# Patient Record
Sex: FEMALE | URBAN - METROPOLITAN AREA
[De-identification: names, ages, dates, MRNs, and addresses within clinical notes are randomized per-mention and may not be internally consistent; named-entity substitution may affect disease eponyms.]

---

## 2017-05-24 ENCOUNTER — OUTPATIENT (OUTPATIENT)
Dept: OUTPATIENT SERVICES | Facility: HOSPITAL | Age: 62
LOS: 1 days | End: 2017-05-24
Payer: MEDICAID

## 2017-05-24 DIAGNOSIS — R07.89 OTHER CHEST PAIN: ICD-10-CM

## 2017-05-24 PROCEDURE — 78452 HT MUSCLE IMAGE SPECT MULT: CPT

## 2017-05-24 PROCEDURE — A9502: CPT

## 2017-05-24 PROCEDURE — 93017 CV STRESS TEST TRACING ONLY: CPT

## 2021-07-20 ENCOUNTER — RESULT REVIEW (OUTPATIENT)
Age: 66
End: 2021-07-20

## 2021-12-24 ENCOUNTER — INPATIENT (INPATIENT)
Facility: HOSPITAL | Age: 66
LOS: 6 days | Discharge: HOME | DRG: 445 | End: 2021-12-31
Attending: STUDENT IN AN ORGANIZED HEALTH CARE EDUCATION/TRAINING PROGRAM | Admitting: HOSPITALIST
Payer: COMMERCIAL

## 2021-12-24 VITALS
HEIGHT: 62 IN | WEIGHT: 89.95 LBS | RESPIRATION RATE: 20 BRPM | DIASTOLIC BLOOD PRESSURE: 79 MMHG | HEART RATE: 87 BPM | OXYGEN SATURATION: 98 % | SYSTOLIC BLOOD PRESSURE: 149 MMHG | TEMPERATURE: 98 F

## 2021-12-24 DIAGNOSIS — K59.00 CONSTIPATION, UNSPECIFIED: ICD-10-CM

## 2021-12-24 DIAGNOSIS — K85.10 BILIARY ACUTE PANCREATITIS WITHOUT NECROSIS OR INFECTION: ICD-10-CM

## 2021-12-24 DIAGNOSIS — K80.50 CALCULUS OF BILE DUCT WITHOUT CHOLANGITIS OR CHOLECYSTITIS WITHOUT OBSTRUCTION: ICD-10-CM

## 2021-12-24 DIAGNOSIS — R73.9 HYPERGLYCEMIA, UNSPECIFIED: ICD-10-CM

## 2021-12-24 DIAGNOSIS — G40.909 EPILEPSY, UNSPECIFIED, NOT INTRACTABLE, WITHOUT STATUS EPILEPTICUS: ICD-10-CM

## 2021-12-24 DIAGNOSIS — E10.9 TYPE 1 DIABETES MELLITUS WITHOUT COMPLICATIONS: ICD-10-CM

## 2021-12-24 LAB
ALBUMIN SERPL ELPH-MCNC: 4.4 G/DL — SIGNIFICANT CHANGE UP (ref 3.3–5)
ALBUMIN SERPL ELPH-MCNC: 4.7 G/DL — SIGNIFICANT CHANGE UP (ref 3.3–5)
ALP SERPL-CCNC: 77 U/L — SIGNIFICANT CHANGE UP (ref 40–120)
ALP SERPL-CCNC: 84 U/L — SIGNIFICANT CHANGE UP (ref 40–120)
ALT FLD-CCNC: 24 U/L — SIGNIFICANT CHANGE UP (ref 10–45)
ALT FLD-CCNC: 26 U/L — SIGNIFICANT CHANGE UP (ref 10–45)
ANION GAP SERPL CALC-SCNC: 14 MMOL/L — SIGNIFICANT CHANGE UP (ref 5–17)
ANION GAP SERPL CALC-SCNC: 15 MMOL/L — SIGNIFICANT CHANGE UP (ref 5–17)
APPEARANCE UR: CLEAR — SIGNIFICANT CHANGE UP
AST SERPL-CCNC: 30 U/L — SIGNIFICANT CHANGE UP (ref 10–40)
AST SERPL-CCNC: 36 U/L — SIGNIFICANT CHANGE UP (ref 10–40)
B-OH-BUTYR SERPL-SCNC: 0.1 MMOL/L — SIGNIFICANT CHANGE UP
BASE EXCESS BLDV CALC-SCNC: 4.3 MMOL/L — HIGH (ref -2–2)
BASOPHILS # BLD AUTO: 0.05 K/UL — SIGNIFICANT CHANGE UP (ref 0–0.2)
BASOPHILS NFR BLD AUTO: 0.9 % — SIGNIFICANT CHANGE UP (ref 0–2)
BILIRUB SERPL-MCNC: 0.3 MG/DL — SIGNIFICANT CHANGE UP (ref 0.2–1.2)
BILIRUB SERPL-MCNC: 0.4 MG/DL — SIGNIFICANT CHANGE UP (ref 0.2–1.2)
BILIRUB UR-MCNC: NEGATIVE — SIGNIFICANT CHANGE UP
BUN SERPL-MCNC: 13 MG/DL — SIGNIFICANT CHANGE UP (ref 7–23)
BUN SERPL-MCNC: 8 MG/DL — SIGNIFICANT CHANGE UP (ref 7–23)
CA-I SERPL-SCNC: 1.15 MMOL/L — SIGNIFICANT CHANGE UP (ref 1.15–1.33)
CALCIUM SERPL-MCNC: 9.1 MG/DL — SIGNIFICANT CHANGE UP (ref 8.4–10.5)
CALCIUM SERPL-MCNC: 9.7 MG/DL — SIGNIFICANT CHANGE UP (ref 8.4–10.5)
CHLORIDE BLDV-SCNC: 97 MMOL/L — SIGNIFICANT CHANGE UP (ref 96–108)
CHLORIDE SERPL-SCNC: 101 MMOL/L — SIGNIFICANT CHANGE UP (ref 96–108)
CHLORIDE SERPL-SCNC: 95 MMOL/L — LOW (ref 96–108)
CO2 BLDV-SCNC: 31 MMOL/L — HIGH (ref 22–26)
CO2 SERPL-SCNC: 24 MMOL/L — SIGNIFICANT CHANGE UP (ref 22–31)
CO2 SERPL-SCNC: 24 MMOL/L — SIGNIFICANT CHANGE UP (ref 22–31)
COLOR SPEC: SIGNIFICANT CHANGE UP
CREAT SERPL-MCNC: 0.55 MG/DL — SIGNIFICANT CHANGE UP (ref 0.5–1.3)
CREAT SERPL-MCNC: 0.61 MG/DL — SIGNIFICANT CHANGE UP (ref 0.5–1.3)
DIFF PNL FLD: NEGATIVE — SIGNIFICANT CHANGE UP
EOSINOPHIL # BLD AUTO: 0.08 K/UL — SIGNIFICANT CHANGE UP (ref 0–0.5)
EOSINOPHIL NFR BLD AUTO: 1.5 % — SIGNIFICANT CHANGE UP (ref 0–6)
GAS PNL BLDV: 127 MMOL/L — LOW (ref 136–145)
GAS PNL BLDV: SIGNIFICANT CHANGE UP
GAS PNL BLDV: SIGNIFICANT CHANGE UP
GLUCOSE BLDV-MCNC: 218 MG/DL — HIGH (ref 70–99)
GLUCOSE SERPL-MCNC: 138 MG/DL — HIGH (ref 70–99)
GLUCOSE SERPL-MCNC: 210 MG/DL — HIGH (ref 70–99)
GLUCOSE UR QL: ABNORMAL
HCO3 BLDV-SCNC: 30 MMOL/L — HIGH (ref 22–29)
HCT VFR BLD CALC: 41.7 % — SIGNIFICANT CHANGE UP (ref 34.5–45)
HCT VFR BLDA CALC: 38 % — SIGNIFICANT CHANGE UP (ref 34.5–46.5)
HGB BLD CALC-MCNC: 12.5 G/DL — SIGNIFICANT CHANGE UP (ref 11.7–16.1)
HGB BLD-MCNC: 14 G/DL — SIGNIFICANT CHANGE UP (ref 11.5–15.5)
IMM GRANULOCYTES NFR BLD AUTO: 0.2 % — SIGNIFICANT CHANGE UP (ref 0–1.5)
KETONES UR-MCNC: ABNORMAL
LACTATE BLDV-MCNC: 2.7 MMOL/L — HIGH (ref 0.7–2)
LEUKOCYTE ESTERASE UR-ACNC: NEGATIVE — SIGNIFICANT CHANGE UP
LIDOCAIN IGE QN: 70 U/L — HIGH (ref 7–60)
LYMPHOCYTES # BLD AUTO: 1.06 K/UL — SIGNIFICANT CHANGE UP (ref 1–3.3)
LYMPHOCYTES # BLD AUTO: 19.2 % — SIGNIFICANT CHANGE UP (ref 13–44)
MCHC RBC-ENTMCNC: 29 PG — SIGNIFICANT CHANGE UP (ref 27–34)
MCHC RBC-ENTMCNC: 33.6 GM/DL — SIGNIFICANT CHANGE UP (ref 32–36)
MCV RBC AUTO: 86.3 FL — SIGNIFICANT CHANGE UP (ref 80–100)
MONOCYTES # BLD AUTO: 0.62 K/UL — SIGNIFICANT CHANGE UP (ref 0–0.9)
MONOCYTES NFR BLD AUTO: 11.3 % — SIGNIFICANT CHANGE UP (ref 2–14)
NEUTROPHILS # BLD AUTO: 3.69 K/UL — SIGNIFICANT CHANGE UP (ref 1.8–7.4)
NEUTROPHILS NFR BLD AUTO: 66.9 % — SIGNIFICANT CHANGE UP (ref 43–77)
NITRITE UR-MCNC: NEGATIVE — SIGNIFICANT CHANGE UP
NRBC # BLD: 0 /100 WBCS — SIGNIFICANT CHANGE UP (ref 0–0)
PCO2 BLDV: 47 MMHG — HIGH (ref 39–42)
PH BLDV: 7.41 — SIGNIFICANT CHANGE UP (ref 7.32–7.43)
PH UR: 7 — SIGNIFICANT CHANGE UP (ref 5–8)
PLATELET # BLD AUTO: 170 K/UL — SIGNIFICANT CHANGE UP (ref 150–400)
PO2 BLDV: 31 MMHG — SIGNIFICANT CHANGE UP (ref 25–45)
POTASSIUM BLDV-SCNC: 7.8 MMOL/L — CRITICAL HIGH (ref 3.5–5.1)
POTASSIUM SERPL-MCNC: 4.1 MMOL/L — SIGNIFICANT CHANGE UP (ref 3.5–5.3)
POTASSIUM SERPL-MCNC: 4.4 MMOL/L — SIGNIFICANT CHANGE UP (ref 3.5–5.3)
POTASSIUM SERPL-SCNC: 4.1 MMOL/L — SIGNIFICANT CHANGE UP (ref 3.5–5.3)
POTASSIUM SERPL-SCNC: 4.4 MMOL/L — SIGNIFICANT CHANGE UP (ref 3.5–5.3)
PROT SERPL-MCNC: 6.9 G/DL — SIGNIFICANT CHANGE UP (ref 6–8.3)
PROT SERPL-MCNC: 7.6 G/DL — SIGNIFICANT CHANGE UP (ref 6–8.3)
PROT UR-MCNC: NEGATIVE — SIGNIFICANT CHANGE UP
RBC # BLD: 4.83 M/UL — SIGNIFICANT CHANGE UP (ref 3.8–5.2)
RBC # FLD: 13 % — SIGNIFICANT CHANGE UP (ref 10.3–14.5)
SAO2 % BLDV: 54.9 % — LOW (ref 67–88)
SODIUM SERPL-SCNC: 134 MMOL/L — LOW (ref 135–145)
SODIUM SERPL-SCNC: 139 MMOL/L — SIGNIFICANT CHANGE UP (ref 135–145)
SP GR SPEC: 1.01 — SIGNIFICANT CHANGE UP (ref 1.01–1.02)
UROBILINOGEN FLD QL: NEGATIVE — SIGNIFICANT CHANGE UP
WBC # BLD: 5.51 K/UL — SIGNIFICANT CHANGE UP (ref 3.8–10.5)
WBC # FLD AUTO: 5.51 K/UL — SIGNIFICANT CHANGE UP (ref 3.8–10.5)

## 2021-12-24 PROCEDURE — 99223 1ST HOSP IP/OBS HIGH 75: CPT

## 2021-12-24 PROCEDURE — 71046 X-RAY EXAM CHEST 2 VIEWS: CPT | Mod: 26

## 2021-12-24 PROCEDURE — G1004: CPT

## 2021-12-24 PROCEDURE — 76705 ECHO EXAM OF ABDOMEN: CPT | Mod: 26,RT

## 2021-12-24 PROCEDURE — 74177 CT ABD & PELVIS W/CONTRAST: CPT | Mod: 26,MG

## 2021-12-24 PROCEDURE — 99285 EMERGENCY DEPT VISIT HI MDM: CPT

## 2021-12-24 RX ORDER — MORPHINE SULFATE 50 MG/1
2 CAPSULE, EXTENDED RELEASE ORAL EVERY 6 HOURS
Refills: 0 | Status: DISCONTINUED | OUTPATIENT
Start: 2021-12-24 | End: 2021-12-25

## 2021-12-24 RX ORDER — DEXTROSE 50 % IN WATER 50 %
15 SYRINGE (ML) INTRAVENOUS ONCE
Refills: 0 | Status: DISCONTINUED | OUTPATIENT
Start: 2021-12-24 | End: 2021-12-31

## 2021-12-24 RX ORDER — PANTOPRAZOLE SODIUM 20 MG/1
40 TABLET, DELAYED RELEASE ORAL
Refills: 0 | Status: DISCONTINUED | OUTPATIENT
Start: 2021-12-24 | End: 2021-12-31

## 2021-12-24 RX ORDER — SODIUM CHLORIDE 9 MG/ML
1000 INJECTION, SOLUTION INTRAVENOUS
Refills: 0 | Status: DISCONTINUED | OUTPATIENT
Start: 2021-12-24 | End: 2021-12-27

## 2021-12-24 RX ORDER — DEXTROSE 50 % IN WATER 50 %
25 SYRINGE (ML) INTRAVENOUS ONCE
Refills: 0 | Status: DISCONTINUED | OUTPATIENT
Start: 2021-12-24 | End: 2021-12-31

## 2021-12-24 RX ORDER — LIDOCAINE 4 G/100G
10 CREAM TOPICAL ONCE
Refills: 0 | Status: COMPLETED | OUTPATIENT
Start: 2021-12-24 | End: 2021-12-24

## 2021-12-24 RX ORDER — KETOROLAC TROMETHAMINE 30 MG/ML
15 SYRINGE (ML) INJECTION ONCE
Refills: 0 | Status: DISCONTINUED | OUTPATIENT
Start: 2021-12-24 | End: 2021-12-24

## 2021-12-24 RX ORDER — INSULIN LISPRO 100/ML
5 VIAL (ML) SUBCUTANEOUS
Refills: 0 | Status: DISCONTINUED | OUTPATIENT
Start: 2021-12-24 | End: 2021-12-29

## 2021-12-24 RX ORDER — INSULIN LISPRO 100/ML
VIAL (ML) SUBCUTANEOUS
Refills: 0 | Status: DISCONTINUED | OUTPATIENT
Start: 2021-12-24 | End: 2021-12-26

## 2021-12-24 RX ORDER — DEXTROSE 50 % IN WATER 50 %
12.5 SYRINGE (ML) INTRAVENOUS ONCE
Refills: 0 | Status: DISCONTINUED | OUTPATIENT
Start: 2021-12-24 | End: 2021-12-31

## 2021-12-24 RX ORDER — POLYETHYLENE GLYCOL 3350 17 G/17G
17 POWDER, FOR SOLUTION ORAL
Refills: 0 | Status: DISCONTINUED | OUTPATIENT
Start: 2021-12-24 | End: 2021-12-31

## 2021-12-24 RX ORDER — MULTIVIT WITH MIN/MFOLATE/K2 340-15/3 G
1 POWDER (GRAM) ORAL ONCE
Refills: 0 | Status: COMPLETED | OUTPATIENT
Start: 2021-12-24 | End: 2021-12-24

## 2021-12-24 RX ORDER — INSULIN LISPRO 100/ML
VIAL (ML) SUBCUTANEOUS AT BEDTIME
Refills: 0 | Status: DISCONTINUED | OUTPATIENT
Start: 2021-12-24 | End: 2021-12-26

## 2021-12-24 RX ORDER — SODIUM CHLORIDE 9 MG/ML
1000 INJECTION INTRAMUSCULAR; INTRAVENOUS; SUBCUTANEOUS ONCE
Refills: 0 | Status: COMPLETED | OUTPATIENT
Start: 2021-12-24 | End: 2021-12-24

## 2021-12-24 RX ORDER — ATORVASTATIN CALCIUM 80 MG/1
40 TABLET, FILM COATED ORAL AT BEDTIME
Refills: 0 | Status: DISCONTINUED | OUTPATIENT
Start: 2021-12-24 | End: 2021-12-31

## 2021-12-24 RX ORDER — FAMOTIDINE 10 MG/ML
20 INJECTION INTRAVENOUS ONCE
Refills: 0 | Status: COMPLETED | OUTPATIENT
Start: 2021-12-24 | End: 2021-12-24

## 2021-12-24 RX ORDER — GLUCAGON INJECTION, SOLUTION 0.5 MG/.1ML
1 INJECTION, SOLUTION SUBCUTANEOUS ONCE
Refills: 0 | Status: DISCONTINUED | OUTPATIENT
Start: 2021-12-24 | End: 2021-12-27

## 2021-12-24 RX ORDER — LEVETIRACETAM 250 MG/1
500 TABLET, FILM COATED ORAL
Refills: 0 | Status: DISCONTINUED | OUTPATIENT
Start: 2021-12-24 | End: 2021-12-31

## 2021-12-24 RX ORDER — SENNA PLUS 8.6 MG/1
2 TABLET ORAL AT BEDTIME
Refills: 0 | Status: DISCONTINUED | OUTPATIENT
Start: 2021-12-24 | End: 2021-12-30

## 2021-12-24 RX ORDER — INSULIN GLARGINE 100 [IU]/ML
2 INJECTION, SOLUTION SUBCUTANEOUS AT BEDTIME
Refills: 0 | Status: DISCONTINUED | OUTPATIENT
Start: 2021-12-24 | End: 2021-12-25

## 2021-12-24 RX ORDER — LACOSAMIDE 50 MG/1
50 TABLET ORAL
Refills: 0 | Status: DISCONTINUED | OUTPATIENT
Start: 2021-12-24 | End: 2021-12-31

## 2021-12-24 RX ADMIN — POLYETHYLENE GLYCOL 3350 17 GRAM(S): 17 POWDER, FOR SOLUTION ORAL at 23:56

## 2021-12-24 RX ADMIN — LEVETIRACETAM 500 MILLIGRAM(S): 250 TABLET, FILM COATED ORAL at 23:56

## 2021-12-24 RX ADMIN — ATORVASTATIN CALCIUM 40 MILLIGRAM(S): 80 TABLET, FILM COATED ORAL at 23:55

## 2021-12-24 RX ADMIN — SODIUM CHLORIDE 1000 MILLILITER(S): 9 INJECTION INTRAMUSCULAR; INTRAVENOUS; SUBCUTANEOUS at 17:15

## 2021-12-24 RX ADMIN — PANTOPRAZOLE SODIUM 40 MILLIGRAM(S): 20 TABLET, DELAYED RELEASE ORAL at 23:55

## 2021-12-24 RX ADMIN — FAMOTIDINE 20 MILLIGRAM(S): 10 INJECTION INTRAVENOUS at 16:10

## 2021-12-24 RX ADMIN — Medication 15 MILLIGRAM(S): at 16:04

## 2021-12-24 RX ADMIN — SENNA PLUS 2 TABLET(S): 8.6 TABLET ORAL at 23:56

## 2021-12-24 RX ADMIN — SODIUM CHLORIDE 100 MILLILITER(S): 9 INJECTION, SOLUTION INTRAVENOUS at 22:56

## 2021-12-24 RX ADMIN — SODIUM CHLORIDE 1000 MILLILITER(S): 9 INJECTION INTRAMUSCULAR; INTRAVENOUS; SUBCUTANEOUS at 16:05

## 2021-12-24 RX ADMIN — LIDOCAINE 10 MILLILITER(S): 4 CREAM TOPICAL at 16:10

## 2021-12-24 RX ADMIN — Medication 1 BOTTLE: at 23:56

## 2021-12-24 RX ADMIN — LACOSAMIDE 50 MILLIGRAM(S): 50 TABLET ORAL at 23:55

## 2021-12-24 RX ADMIN — Medication 30 MILLILITER(S): at 23:56

## 2021-12-24 NOTE — ED PROVIDER NOTE - OBJECTIVE STATEMENT
65yo woman with PMH HLD, DM1, seizure hx on keppra, presenting with 3 weeks of lightheadedness and 6 days of burning abdominal pain that radiates to the chest and no BM. Patient states that her glucose has been >400 over the last week. Has been eating and drinking normally, sometimes feels that she wants to eat more but can't. Has had urinary frequency. Denies nausea, vomiting, back pain.

## 2021-12-24 NOTE — H&P ADULT - ASSESSMENT
66F c hx HTN, DM1, epilepsy on keppra/vimpat, vitilogo, chronic palpitations, pw constipation, poss gallstone pancreatitis, and hyperglycemia

## 2021-12-24 NOTE — H&P ADULT - PROBLEM SELECTOR PLAN 2
- mild lipase elevated  - morphine pain control  - diet as tolerated  - GI reportedly consulted by ED  - LR

## 2021-12-24 NOTE — ED PROVIDER NOTE - PROGRESS NOTE DETAILS
Sang, PGY-2  Radiology called with concern for choledocholithiasis. GI consulted who advised to obtain bcx, repeat CBC and CMP for signs of leukocytosis or transaminitis, coags, and can hold and in setting of no fever and no leukocytosis Anuj, PGY1 - Hospitalist consulted for admission. Will admit under Dr. Wright.

## 2021-12-24 NOTE — H&P ADULT - NSICDXFAMILYHX_GEN_ALL_CORE_FT
FAMILY HISTORY:  Grandparent  Still living? No  FH: type 1 diabetes, Age at diagnosis: Age Unknown

## 2021-12-24 NOTE — H&P ADULT - NSHPREVIEWOFSYSTEMS_GEN_ALL_CORE
REVIEW OF SYSTEMS:  CONSTITUTIONAL: No weakness. No fevers. No chills. No rigors. No weight loss. No night sweats. No poor appetite.  EYES: No blurry or double vision. No eye pain.  ENT: No hearing difficulty. No vertigo. No dysphagia. No sore throat. No Sinusitis/rhinorrhea.   NECK: No pain. No stiffness/rigidity.  CARDIAC: +chronic chest pain. +chronic palpitations. +lightheadedness. No syncope.  RESPIRATORY: No cough. No SOB. No hemoptysis.  GASTROINTESTINAL: +abdominal pain. No nausea. No vomiting. No hematemesis. No diarrhea. +constipation.   GENITOURINARY: No dysuria. No frequency. No hesitancy. No hematuria. No oliguria.  NEUROLOGICAL: No numbness/tingling. No focal weakness. No urinary or fecal incontinence. No headache. No unsteady gait.  BACK: No back pain. No flank pain.  EXTREMITIES: No lower extremity edema. Full ROM. No joint pain.  SKIN: No rashes. No itching. No other lesions.  PSYCHIATRIC: No depression. No anxiety. No SI/HI.  ALLERGIC: No lip swelling. No hives.  All other review of systems is negative unless indicated above.  Unless indicated above, unable to assess ROS 2/2

## 2021-12-24 NOTE — ED ADULT NURSE NOTE - NSIMPLEMENTINTERV_GEN_ALL_ED
Implemented All Universal Safety Interventions:  [place] to call system. Call bell, personal items and telephone within reach. Instruct patient to call for assistance. Room bathroom lighting operational. Non-slip footwear when patient is off stretcher. Physically safe environment: no spills, clutter or unnecessary equipment. Stretcher in lowest position, wheels locked, appropriate side rails in place.

## 2021-12-24 NOTE — H&P ADULT - NSHPPHYSICALEXAM_GEN_ALL_CORE
PHYSICAL EXAM:   GENERAL: Alert. Not confused. No acute distress. Not thin. +cachectic. Not obese.  HEAD:  Atraumatic. Normocephalic.  EYES: EOMI. PERRLA. Normal conjunctiva/sclera.  ENT: Neck supple. No JVD. Moist oral mucosa. Not edentulous. No thrush.  LYMPH: Normal supraclavicular/cervical lymph nodes.   CARDIAC: Not tachy, Not santiago. Regular rhythm. Not irregularly irregular. S1. S2.   LUNG/CHEST: CTAB. BS equal bilaterally. No wheezes. No rales. No rhonchi.  ABDOMEN: Soft. +mild epigastric tenderness. No distension. No fluid wave. Normal bowel sounds.  BACK: No midline/vertebral tenderness. No flank tenderness.  VASCULAR: +2 b/l radial or ulnar pulses. Palpable DP pulses.  EXTREMITIES:  No clubbing. No cyanosis. No edema. Moving all 4.  NEUROLOGY: A&Ox3. Non-focal exam. Cranial nerves intact. Normal speech. Sensation intact.  PSYCH: Normal behavior. Normal affect.  SKIN: No jaundice. No erythema. No rash/lesion.  Vascular Access:     ICU Vital Signs Last 24 Hrs  T(C): 36.6 (24 Dec 2021 15:45), Max: 36.6 (24 Dec 2021 12:56)  T(F): 97.8 (24 Dec 2021 15:45), Max: 97.9 (24 Dec 2021 12:56)  HR: 68 (24 Dec 2021 21:15) (68 - 90)  BP: 138/73 (24 Dec 2021 21:15) (126/80 - 149/79)  BP(mean): --  ABP: --  ABP(mean): --  RR: 16 (24 Dec 2021 21:15) (16 - 20)  SpO2: 99% (24 Dec 2021 21:15) (98% - 99%)      I&O's Summary

## 2021-12-24 NOTE — ED PROVIDER NOTE - NS ED ROS FT
GENERAL: No fever, no chills  EYES: No change in vision  HEENT: No trouble swallowing or speaking  CARDIAC: No chest pain  PULMONARY: No cough, no SOB  GI: +abdominal pain, no nausea or no vomiting, no diarrhea, +constipation  : +burning with urination  SKIN: No rashes  NEURO: No headache, no numbness  MSK: No joint pain  Otherwise as HPI or negative.

## 2021-12-24 NOTE — ED PROVIDER NOTE - ATTENDING CONTRIBUTION TO CARE
Ysabeles - 65yo F with PMH HLD, DM, seizure hx on keppra, presenting with 3 weeks of lightheadedness and 6 days of burning abdominal pain that radiates to the chest and no BM. Patient states that her glucose has been >400 over the last week. Has been eating and drinking normally, sometimes feels that she wants to eat more but can't. Has had urinary frequency. Denies nausea, vomiting, back pain, fevers.  VS wnl, well appearing, in NAD. Moist mucosae, pink conjunctivae. Neck supple, neuro grossly intact. Lungs clear, cardiac wnl, no JVD. Abdomen soft/TTP to epigastric and lower abdomen, no CVAT. No pedal edema, no calf TTP.   Poss appy vs diverticulitis vs pancreatitis vs SBO.  Will get abdom labs, hydrate, CT abdomen, reevaluate

## 2021-12-24 NOTE — ED PROVIDER NOTE - CLINICAL SUMMARY MEDICAL DECISION MAKING FREE TEXT BOX
Anuj, PGY1 - no BM in 6 days, diffuse abdominal tenderness mostly in epigastric and suprapubic, consider simple constipation, appendicitis, acid reflux, diverticulitis. No SOB, no cough, no trauma decreased suspicion for pneumonia, pneumothorax. Chest pain is from abd pain radiation, no arm pain, decreased suspicion for MI. ct a/p, cxr, labs, ua and urine culture. *The above represents an initial assessment/impression. Please refer to progress notes for potential changes in patient clinical course*

## 2021-12-24 NOTE — ED ADULT TRIAGE NOTE - CHIEF COMPLAINT QUOTE
abdominal pain since monday. also endorsing constipation since monday. states she is also having palpitations x 2 weeks with CP, has been feeling dizzy. is a diabetic and states that her sugars have been in the 400s. 282 FS in triage

## 2021-12-24 NOTE — H&P ADULT - NSHPLABSRESULTS_GEN_ALL_CORE
Personally reviewed old records.  Personally reviewed labs.  Personally reviewed imaging.                          14.0   5.51  )-----------( 170      ( 24 Dec 2021 15:50 )             41.7           139  |  101  |  8   ----------------------------<  138<H>  4.1   |  24  |  0.55    Ca    9.1      24 Dec 2021 20:23    TPro  6.9  /  Alb  4.4  /  TBili  0.4  /  DBili  x   /  AST  30  /  ALT  24  /  AlkPhos  77  12            LIVER FUNCTIONS - ( 24 Dec 2021 20:23 )  Alb: 4.4 g/dL / Pro: 6.9 g/dL / ALK PHOS: 77 U/L / ALT: 24 U/L / AST: 30 U/L / GGT: x                 Urinalysis Basic - ( 24 Dec 2021 16:23 )    Color: Light Yellow / Appearance: Clear / S.010 / pH: x  Gluc: x / Ketone: Small  / Bili: Negative / Urobili: Negative   Blood: x / Protein: Negative / Nitrite: Negative   Leuk Esterase: Negative / RBC: x / WBC x   Sq Epi: x / Non Sq Epi: x / Bacteria: x

## 2021-12-24 NOTE — ED PROVIDER NOTE - NSICDXPASTMEDICALHX_GEN_ALL_CORE_FT
PAST MEDICAL HISTORY:  DM2 (diabetes mellitus, type 2)     HLD (hyperlipidemia)      PAST MEDICAL HISTORY:  DM (diabetes mellitus) Type 1    HLD (hyperlipidemia)

## 2021-12-24 NOTE — ED PROVIDER NOTE - PHYSICAL EXAMINATION
Gen: NAD, AOx3, able to make needs known, non-toxic  Head: NCAT  HEENT: EOMI, oral mucosa moist, normal conjunctiva  Lung: CTAB, no respiratory distress, wheezes on the right, speaking in full sentences  CV: RRR, no M/R/G, pulses bilaterally   Abd: soft, diffuse tenderness but mostly on epigastric and suprapubic regions, no guarding, no CVA tenderness  MSK: no visible bony deformities  Neuro: No focal sensory or motor deficits  Skin: Warm, well perfused, no rash  Psych: normal affect

## 2021-12-24 NOTE — H&P ADULT - NSHPSOCIALHISTORY_GEN_ALL_CORE
Social History:    Marital Status: (  ) , ( x ) Single, (  ) , (  ) , (  )   # of Children: 0  Lives with: (  ) alone, (  ) children, (  ) spouse, (  ) parents, ( x ) siblings, (  ) friends, (  ) other:   Occupation:     Substance Use/Illicit Drugs: (  ) never used vs other:   Tobacco Usage: ( x ) never smoked, (  ) former smoker, (  ) current smoker and Total Pack-Years:   Last Alcohol Usage/Frequency/Amount/Withdrawal/Hx of Abuse:  none  Foreign travel:   Animal exposure:

## 2021-12-24 NOTE — H&P ADULT - PROBLEM SELECTOR PLAN 3
- pt states her insulin regimen was changed to novolog by endo 1 week ago.  - cont lantus 2U + 5 admelog qac + low ISS for now  - LR

## 2021-12-25 DIAGNOSIS — E78.5 HYPERLIPIDEMIA, UNSPECIFIED: ICD-10-CM

## 2021-12-25 DIAGNOSIS — K80.50 CALCULUS OF BILE DUCT WITHOUT CHOLANGITIS OR CHOLECYSTITIS WITHOUT OBSTRUCTION: ICD-10-CM

## 2021-12-25 DIAGNOSIS — E10.65 TYPE 1 DIABETES MELLITUS WITH HYPERGLYCEMIA: ICD-10-CM

## 2021-12-25 DIAGNOSIS — Z29.9 ENCOUNTER FOR PROPHYLACTIC MEASURES, UNSPECIFIED: ICD-10-CM

## 2021-12-25 DIAGNOSIS — I10 ESSENTIAL (PRIMARY) HYPERTENSION: ICD-10-CM

## 2021-12-25 LAB
A1C WITH ESTIMATED AVERAGE GLUCOSE RESULT: 8.5 % — HIGH (ref 4–5.6)
ALBUMIN SERPL ELPH-MCNC: 4 G/DL — SIGNIFICANT CHANGE UP (ref 3.3–5)
ALP SERPL-CCNC: 71 U/L — SIGNIFICANT CHANGE UP (ref 40–120)
ALT FLD-CCNC: 23 U/L — SIGNIFICANT CHANGE UP (ref 10–45)
ANION GAP SERPL CALC-SCNC: 12 MMOL/L — SIGNIFICANT CHANGE UP (ref 5–17)
APTT BLD: 28.2 SEC — SIGNIFICANT CHANGE UP (ref 27.5–35.5)
AST SERPL-CCNC: 32 U/L — SIGNIFICANT CHANGE UP (ref 10–40)
BASOPHILS # BLD AUTO: 0.05 K/UL — SIGNIFICANT CHANGE UP (ref 0–0.2)
BASOPHILS NFR BLD AUTO: 0.9 % — SIGNIFICANT CHANGE UP (ref 0–2)
BILIRUB SERPL-MCNC: 0.4 MG/DL — SIGNIFICANT CHANGE UP (ref 0.2–1.2)
BUN SERPL-MCNC: 7 MG/DL — SIGNIFICANT CHANGE UP (ref 7–23)
CALCIUM SERPL-MCNC: 9 MG/DL — SIGNIFICANT CHANGE UP (ref 8.4–10.5)
CHLORIDE SERPL-SCNC: 100 MMOL/L — SIGNIFICANT CHANGE UP (ref 96–108)
CHOLEST SERPL-MCNC: 146 MG/DL — SIGNIFICANT CHANGE UP
CO2 SERPL-SCNC: 20 MMOL/L — LOW (ref 22–31)
CREAT SERPL-MCNC: 0.47 MG/DL — LOW (ref 0.5–1.3)
CULTURE RESULTS: SIGNIFICANT CHANGE UP
EOSINOPHIL # BLD AUTO: 0.19 K/UL — SIGNIFICANT CHANGE UP (ref 0–0.5)
EOSINOPHIL NFR BLD AUTO: 3.5 % — SIGNIFICANT CHANGE UP (ref 0–6)
ESTIMATED AVERAGE GLUCOSE: 197 MG/DL — HIGH (ref 68–114)
GLUCOSE SERPL-MCNC: 228 MG/DL — HIGH (ref 70–99)
HCT VFR BLD CALC: 35.6 % — SIGNIFICANT CHANGE UP (ref 34.5–45)
HCV AB S/CO SERPL IA: 0.09 S/CO — SIGNIFICANT CHANGE UP (ref 0–0.99)
HCV AB SERPL-IMP: SIGNIFICANT CHANGE UP
HDLC SERPL-MCNC: 86 MG/DL — SIGNIFICANT CHANGE UP
HGB BLD-MCNC: 12.1 G/DL — SIGNIFICANT CHANGE UP (ref 11.5–15.5)
IMM GRANULOCYTES NFR BLD AUTO: 0.2 % — SIGNIFICANT CHANGE UP (ref 0–1.5)
INR BLD: 1.01 RATIO — SIGNIFICANT CHANGE UP (ref 0.88–1.16)
LIPID PNL WITH DIRECT LDL SERPL: 52 MG/DL — SIGNIFICANT CHANGE UP
LYMPHOCYTES # BLD AUTO: 0.98 K/UL — LOW (ref 1–3.3)
LYMPHOCYTES # BLD AUTO: 17.9 % — SIGNIFICANT CHANGE UP (ref 13–44)
MAGNESIUM SERPL-MCNC: 2.4 MG/DL — SIGNIFICANT CHANGE UP (ref 1.6–2.6)
MCHC RBC-ENTMCNC: 29.3 PG — SIGNIFICANT CHANGE UP (ref 27–34)
MCHC RBC-ENTMCNC: 34 GM/DL — SIGNIFICANT CHANGE UP (ref 32–36)
MCV RBC AUTO: 86.2 FL — SIGNIFICANT CHANGE UP (ref 80–100)
MONOCYTES # BLD AUTO: 0.43 K/UL — SIGNIFICANT CHANGE UP (ref 0–0.9)
MONOCYTES NFR BLD AUTO: 7.8 % — SIGNIFICANT CHANGE UP (ref 2–14)
NEUTROPHILS # BLD AUTO: 3.83 K/UL — SIGNIFICANT CHANGE UP (ref 1.8–7.4)
NEUTROPHILS NFR BLD AUTO: 69.7 % — SIGNIFICANT CHANGE UP (ref 43–77)
NON HDL CHOLESTEROL: 60 MG/DL — SIGNIFICANT CHANGE UP
NRBC # BLD: 0 /100 WBCS — SIGNIFICANT CHANGE UP (ref 0–0)
PHOSPHATE SERPL-MCNC: 2.8 MG/DL — SIGNIFICANT CHANGE UP (ref 2.5–4.5)
PLATELET # BLD AUTO: 170 K/UL — SIGNIFICANT CHANGE UP (ref 150–400)
POTASSIUM SERPL-MCNC: 4.3 MMOL/L — SIGNIFICANT CHANGE UP (ref 3.5–5.3)
POTASSIUM SERPL-SCNC: 4.3 MMOL/L — SIGNIFICANT CHANGE UP (ref 3.5–5.3)
PROT SERPL-MCNC: 6.5 G/DL — SIGNIFICANT CHANGE UP (ref 6–8.3)
PROTHROM AB SERPL-ACNC: 12.1 SEC — SIGNIFICANT CHANGE UP (ref 10.6–13.6)
RBC # BLD: 4.13 M/UL — SIGNIFICANT CHANGE UP (ref 3.8–5.2)
RBC # FLD: 12.9 % — SIGNIFICANT CHANGE UP (ref 10.3–14.5)
SARS-COV-2 RNA SPEC QL NAA+PROBE: SIGNIFICANT CHANGE UP
SODIUM SERPL-SCNC: 132 MMOL/L — LOW (ref 135–145)
SPECIMEN SOURCE: SIGNIFICANT CHANGE UP
TRIGL SERPL-MCNC: 38 MG/DL — SIGNIFICANT CHANGE UP
TROPONIN T, HIGH SENSITIVITY RESULT: 11 NG/L — SIGNIFICANT CHANGE UP (ref 0–51)
TSH SERPL-MCNC: 2.92 UIU/ML — SIGNIFICANT CHANGE UP (ref 0.27–4.2)
WBC # BLD: 5.49 K/UL — SIGNIFICANT CHANGE UP (ref 3.8–10.5)
WBC # FLD AUTO: 5.49 K/UL — SIGNIFICANT CHANGE UP (ref 3.8–10.5)

## 2021-12-25 PROCEDURE — 99222 1ST HOSP IP/OBS MODERATE 55: CPT | Mod: GC

## 2021-12-25 PROCEDURE — 99222 1ST HOSP IP/OBS MODERATE 55: CPT

## 2021-12-25 PROCEDURE — 99233 SBSQ HOSP IP/OBS HIGH 50: CPT

## 2021-12-25 RX ORDER — INFLUENZA VIRUS VACCINE 15; 15; 15; 15 UG/.5ML; UG/.5ML; UG/.5ML; UG/.5ML
0.7 SUSPENSION INTRAMUSCULAR ONCE
Refills: 0 | Status: DISCONTINUED | OUTPATIENT
Start: 2021-12-25 | End: 2021-12-31

## 2021-12-25 RX ORDER — INSULIN GLARGINE 100 [IU]/ML
8 INJECTION, SOLUTION SUBCUTANEOUS AT BEDTIME
Refills: 0 | Status: DISCONTINUED | OUTPATIENT
Start: 2021-12-25 | End: 2021-12-26

## 2021-12-25 RX ADMIN — PANTOPRAZOLE SODIUM 40 MILLIGRAM(S): 20 TABLET, DELAYED RELEASE ORAL at 18:12

## 2021-12-25 RX ADMIN — INSULIN GLARGINE 2 UNIT(S): 100 INJECTION, SOLUTION SUBCUTANEOUS at 00:11

## 2021-12-25 RX ADMIN — POLYETHYLENE GLYCOL 3350 17 GRAM(S): 17 POWDER, FOR SOLUTION ORAL at 18:11

## 2021-12-25 RX ADMIN — Medication 3: at 18:05

## 2021-12-25 RX ADMIN — LEVETIRACETAM 500 MILLIGRAM(S): 250 TABLET, FILM COATED ORAL at 18:12

## 2021-12-25 RX ADMIN — Medication 1: at 22:19

## 2021-12-25 RX ADMIN — LACOSAMIDE 50 MILLIGRAM(S): 50 TABLET ORAL at 22:23

## 2021-12-25 RX ADMIN — Medication 2: at 09:06

## 2021-12-25 RX ADMIN — INSULIN GLARGINE 8 UNIT(S): 100 INJECTION, SOLUTION SUBCUTANEOUS at 22:19

## 2021-12-25 RX ADMIN — PANTOPRAZOLE SODIUM 40 MILLIGRAM(S): 20 TABLET, DELAYED RELEASE ORAL at 05:39

## 2021-12-25 RX ADMIN — Medication 3: at 12:21

## 2021-12-25 RX ADMIN — Medication 5 UNIT(S): at 18:06

## 2021-12-25 RX ADMIN — POLYETHYLENE GLYCOL 3350 17 GRAM(S): 17 POWDER, FOR SOLUTION ORAL at 05:39

## 2021-12-25 RX ADMIN — ATORVASTATIN CALCIUM 40 MILLIGRAM(S): 80 TABLET, FILM COATED ORAL at 21:05

## 2021-12-25 RX ADMIN — Medication 5 UNIT(S): at 12:22

## 2021-12-25 RX ADMIN — LEVETIRACETAM 500 MILLIGRAM(S): 250 TABLET, FILM COATED ORAL at 05:39

## 2021-12-25 RX ADMIN — Medication 5 UNIT(S): at 09:06

## 2021-12-25 RX ADMIN — LACOSAMIDE 50 MILLIGRAM(S): 50 TABLET ORAL at 12:31

## 2021-12-25 NOTE — CONSULT NOTE ADULT - ASSESSMENT
66F c hx HTN, DM1, epilepsy on keppra/vimpat, vitilogo, chronic palpitations, pw 6 days constipation and abdominal pain with work up revealing gallstones and choledocholithiasis with 4 mm stone at the level of the pancreas    #Abd pain  #choledocholithiasis with 4 mm stone at the level of the pancreas  Most likely source of abdominal pain. normal lipase and nl finding of pancreas in CT, making pancreatitis less likely.     #Constipation    Recommendations:  -Obtain MRCP  -Advance diet as tolerated  -Start Miralax BID, given tap water enema x 2 if no BM in the next 24 hrs.      Chacorta Viramontes MD  Gastroenterology/Hepatology Fellow  Contact on-call GI fellow via answering service ([phone]) from 5pm-8am AND on weekends/holidays           66F c hx HTN, DM1, epilepsy on keppra/vimpat, vitilogo, chronic palpitations, pw 6 days constipation and abdominal pain with work up revealing gallstones and choledocholithiasis with 4 mm stone at the level of the pancreas    #Abd pain  #choledocholithiasis with 4 mm stone at the level of the pancreas  Most likely source of abdominal pain. slight elevation in lipase and nl finding of pancreas in CT, thus no concern fro pancreatitis  Reports sxs improvement.     #Constipation    Recommendations:  -Advance diet as tolerated  -Start Miralax BID, given tap water enema x 2 if no BM in the next 24 hrs.  -Will discuss with advance attending regarding timing for ERCP (inpatient vs outpatient given pain improved). NO urgency for ERCP at this time.    Chacorta Viramontes MD  Gastroenterology/Hepatology Fellow  Contact on-call GI fellow via answering service ([phone]) from 5pm-8am AND on weekends/holidays

## 2021-12-25 NOTE — CONSULT NOTE ADULT - SUBJECTIVE AND OBJECTIVE BOX
HPI:  66F c hx HTN, DM1, epilepsy on keppra/vimpat, vitilogo, chronic palpitations, pw 6 days constipation and abdominal pain for the past 2-3 weeks Regarding her pain, reports its localized in epigastric region. Pain is always there but exacerbated by food. No N or V reported. Patient has never had any previous episdoes similar to htis, denies any previous pancreatitis or gallstone issues.     Pt's last colonscopy was 11 yrs ago. Never had EGD before.    Allergies:  penicillin (Headache)        Hospital Medications:  atorvastatin 40 milliGRAM(s) Oral at bedtime  dextrose 40% Gel 15 Gram(s) Oral once  dextrose 5%. 1000 milliLiter(s) IV Continuous <Continuous>  dextrose 5%. 1000 milliLiter(s) IV Continuous <Continuous>  dextrose 50% Injectable 25 Gram(s) IV Push once  dextrose 50% Injectable 12.5 Gram(s) IV Push once  dextrose 50% Injectable 25 Gram(s) IV Push once  glucagon  Injectable 1 milliGRAM(s) IntraMuscular once  influenza  Vaccine (HIGH DOSE) 0.7 milliLiter(s) IntraMuscular once  insulin glargine Injectable (LANTUS) 2 Unit(s) SubCutaneous at bedtime  insulin lispro (ADMELOG) corrective regimen sliding scale   SubCutaneous three times a day before meals  insulin lispro (ADMELOG) corrective regimen sliding scale   SubCutaneous at bedtime  insulin lispro Injectable (ADMELOG) 5 Unit(s) SubCutaneous three times a day before meals  lacosamide 50 milliGRAM(s) Oral two times a day  lactated ringers. 1000 milliLiter(s) IV Continuous <Continuous>  levETIRAcetam 500 milliGRAM(s) Oral two times a day  pantoprazole    Tablet 40 milliGRAM(s) Oral two times a day  polyethylene glycol 3350 17 Gram(s) Oral two times a day  senna 2 Tablet(s) Oral at bedtime      PMHX/PSHX:  HLD (hyperlipidemia)    DM2 (diabetes mellitus, type 2)    DM (diabetes mellitus)    No significant past surgical history        Family history:  FH: type 1 diabetes (Grandparent)        Social History: no smoking    ROS:   all negative except as mentioned above.      PHYSICAL EXAM:   GENERAL:  NAD, Appears stated age  HEENT:  NC/AT,  conjunctivae clear and pink, sclera -anicteric  CHEST:  CTA B/L, Normal effort  HEART:  RRR S1/S2,  ABDOMEN:  Soft, mild discomfort in epigastric region  EXTREMITIES:  No cyanosis or Edema  SKIN:  Warm & Dry. No rash or erythema  NEURO:  Alert, oriented, no focal deficit    Vital Signs:  Vital Signs Last 24 Hrs  T(C): 36.6 (25 Dec 2021 04:20), Max: 36.6 (24 Dec 2021 12:56)  T(F): 97.9 (25 Dec 2021 04:20), Max: 97.9 (24 Dec 2021 12:56)  HR: 71 (25 Dec 2021 04:20) (68 - 90)  BP: 120/70 (25 Dec 2021 04:20) (120/70 - 149/79)  BP(mean): --  RR: 16 (25 Dec 2021 04:20) (16 - 20)  SpO2: 98% (25 Dec 2021 04:20) (98% - 100%)  Daily Height in cm: 157.48 (24 Dec 2021 12:56)    Daily     LABS:                        12.1   5.49  )-----------( 170      ( 25 Dec 2021 07:04 )             35.6     Mean Cell Volume: 86.2 fl (- @ 07:04)        132<L>  |  100  |  7   ----------------------------<  228<H>  4.3   |  20<L>  |  0.47<L>    Ca    9.0      25 Dec 2021 07:04  Phos  2.8       Mg     2.4         TPro  6.5  /  Alb  4.0  /  TBili  0.4  /  DBili  x   /  AST  32  /  ALT  23  /  AlkPhos  71      LIVER FUNCTIONS - ( 25 Dec 2021 07:04 )  Alb: 4.0 g/dL / Pro: 6.5 g/dL / ALK PHOS: 71 U/L / ALT: 23 U/L / AST: 32 U/L / GGT: x           PT/INR - ( 25 Dec 2021 07:08 )   PT: 12.1 sec;   INR: 1.01 ratio         PTT - ( 25 Dec 2021 07:08 )  PTT:28.2 sec  Urinalysis Basic - ( 24 Dec 2021 16:23 )    Color: Light Yellow / Appearance: Clear / S.010 / pH: x  Gluc: x / Ketone: Small  / Bili: Negative / Urobili: Negative   Blood: x / Protein: Negative / Nitrite: Negative   Leuk Esterase: Negative / RBC: x / WBC x   Sq Epi: x / Non Sq Epi: x / Bacteria: x      Amylase Serum--      Lipase serum70       Ammonia--                          12.1   5.49  )-----------( 170      ( 25 Dec 2021 07:04 )             35.6                         14.0   5.51  )-----------( 170      ( 24 Dec 2021 15:50 )             41.7     Imaging:  < from: US Abdomen Upper Quadrant Right (21 @ 19:22) >  FINDINGS:    Liver: Within normal limits.  Bile ducts: No evidence for intrahepatic biliary ductal dilatation.   Common bile duct measures 6-7 mm. Note is made of a 6 mm calculus within   the extrahepatic CBD.  Gallbladder: Multiple gallstones identified. No definite gallbladder wall   thickening identified. No pericholecystic fluid.  Pancreas: Visualized portions are within normal limits.  Right kidney: 9.9 cm. No hydronephrosis.  Ascites: None.  IVC and aorta: Visualized portions are within normal limits.    IMPRESSION:  Common bile duct measures 6-7 mm. Note is made of a 6 mm calculus within   the extrahepatic CBD. Multiple gallstones. No definite gallbladder wall   thickening.    < end of copied text >  < from: CT Abdomen and Pelvis w/ IV Cont (21 @ 17:08) >    FINDINGS:  LOWER CHEST: Tree millimeter right lower lobe subpleural nodule    LIVER: Within normal limits.  BILE DUCTS: 0.4 cm stone within the common bile duct at the level of the   pancreatic head with the duct measuring approximately 1.0 cm in diameter.  GALLBLADDER: Cholelithiasis.  SPLEEN: Within normal limits.  PANCREAS: Within normal limits.  ADRENALS: Within normal limits.  KIDNEYS/URETERS: Within normal limits.    BLADDER: Within normal limits.  REPRODUCTIVE ORGANS: Uterus and adnexa within normal limits.    BOWEL: No bowel obstruction. Appendix normal.  PERITONEUM: Small ascites in the pelvis  VESSELS: Atherosclerotic changes.  RETROPERITONEUM/LYMPH NODES: No lymphadenopathy.  ABDOMINAL WALL: Within normal limits.  BONES: Degenerative changes.    IMPRESSION:  Choledocholithiasis with 0.4 cm stone at the level of the pancreatic head   within the main bile duct with resulting mild intrahepatic biliary ductal   dilation    < end of copied text >

## 2021-12-25 NOTE — PATIENT PROFILE ADULT - FALL HARM RISK - UNIVERSAL INTERVENTIONS
Bed in lowest position, wheels locked, appropriate side rails in place/Call bell, personal items and telephone in reach/Instruct patient to call for assistance before getting out of bed or chair/Non-slip footwear when patient is out of bed/[place] to call system/Physically safe environment - no spills, clutter or unnecessary equipment/Purposeful Proactive Rounding/Room/bathroom lighting operational, light cord in reach

## 2021-12-25 NOTE — PATIENT PROFILE ADULT - NSPROPTRIGHTSUPPORTPHONE_GEN_A_NUR
07/21/21 1331   COVID Exposure Risk Screening   Have you been practicing social distancing? Yes   Have you been wearing a mask when in the community? Yes   Are the people you live with following social distancing and wearing a mask?  Yes   While you are [phone]

## 2021-12-25 NOTE — CONSULT NOTE ADULT - SUBJECTIVE AND OBJECTIVE BOX
HPI:  66F hx HTN, DM1, epilepsy on keppra/vimpat, vitiligo, chronic palpitations, pw 6 days constipation, lightheadedness, burning epigastric pain. Appears to be due to choledocholithiasis. Endocrine consulted for T1DM management.    DM diagnosis: T1DM, diagnosed  Last A1c:  Endocrinologist:  Home DM meds:  Adherence:  Microvascular complications: Renal, ophthalmologic, neuropathy  Macrovascular complications: CVA, MI, PAD  SMBG:  BS range:  Symptoms:  Diet at home:  Appetite in the hospital:  PMHx: As above. No known hx of HF, pancreatitis or UTIs.  PSHx:  FHx: No known FHx of DM or thyroid cancer.  SHx:  Insurance:  Resides in:      PAST MEDICAL & SURGICAL HISTORY:  HLD (hyperlipidemia)    DM (diabetes mellitus)  Type 1    No significant past surgical history        FAMILY HISTORY:  FH: type 1 diabetes (Grandparent)        Social History:    Outpatient Medications: Home Medications:  Basaglar KwikPen 100 units/mL subcutaneous solution: 2 unit(s) subcutaneous once a day (at bedtime) (24 Dec 2021 22:22)  Crestor 20 mg oral tablet: 1 tab(s) orally once a day (24 Dec 2021 22:22)  Keppra 500 mg oral tablet: 1 tab(s) orally 2 times a day (24 Dec 2021 22:22)  metoprolol tartrate 25 mg oral tablet: 0.5 tab(s) orally once a day (24 Dec 2021 22:22)  NovoLOG 100 units/mL injectable solution: 8 unit(s) injectable 3 times a day (24 Dec 2021 22:22)  omeprazole 20 mg oral delayed release capsule: 1 cap(s) orally once a day (24 Dec 2021 22:22)  Vimpat 50 mg oral tablet: 1 tab(s) orally 2 times a day (24 Dec 2021 22:22)  Zetia 10 mg oral tablet: 1 tab(s) orally once a day (24 Dec 2021 22:22)      MEDICATIONS  (STANDING):  atorvastatin 40 milliGRAM(s) Oral at bedtime  dextrose 40% Gel 15 Gram(s) Oral once  dextrose 5%. 1000 milliLiter(s) (50 mL/Hr) IV Continuous <Continuous>  dextrose 5%. 1000 milliLiter(s) (100 mL/Hr) IV Continuous <Continuous>  dextrose 50% Injectable 25 Gram(s) IV Push once  dextrose 50% Injectable 12.5 Gram(s) IV Push once  dextrose 50% Injectable 25 Gram(s) IV Push once  glucagon  Injectable 1 milliGRAM(s) IntraMuscular once  influenza  Vaccine (HIGH DOSE) 0.7 milliLiter(s) IntraMuscular once  insulin glargine Injectable (LANTUS) 2 Unit(s) SubCutaneous at bedtime  insulin lispro (ADMELOG) corrective regimen sliding scale   SubCutaneous three times a day before meals  insulin lispro (ADMELOG) corrective regimen sliding scale   SubCutaneous at bedtime  insulin lispro Injectable (ADMELOG) 5 Unit(s) SubCutaneous three times a day before meals  lacosamide 50 milliGRAM(s) Oral two times a day  lactated ringers. 1000 milliLiter(s) (100 mL/Hr) IV Continuous <Continuous>  levETIRAcetam 500 milliGRAM(s) Oral two times a day  pantoprazole    Tablet 40 milliGRAM(s) Oral two times a day  polyethylene glycol 3350 17 Gram(s) Oral two times a day  senna 2 Tablet(s) Oral at bedtime    MEDICATIONS  (PRN):      Allergies    penicillin (Headache)    Intolerances      Review of Systems:  Constitutional: No fever, chills   Neuro: No tremors, headache   Cardiovascular: No chest pain, palpitations  Respiratory: No SOB, no cough  GI: No nausea, vomiting, abdominal pain  : No dysuria, polyuria   Skin: no rash, ulcers   Psych: no depression, anxiety   Endocrine: no polyphagia, polydipsia     ALL OTHER SYSTEMS REVIEWED AND NEGATIVE        PHYSICAL EXAM:  VITALS: T(C): 36.7 (12-25-21 @ 11:15)  T(F): 98 (12-25-21 @ 11:15), Max: 98 (12-25-21 @ 11:15)  HR: 73 (12-25-21 @ 11:15) (68 - 90)  BP: 96/58 (12-25-21 @ 11:15) (96/58 - 138/73)  RR:  (16 - 18)  SpO2:  (98% - 100%)  Wt(kg): --  GENERAL: NAD, well-groomed, well-developed  EYES: No proptosis, anicteric  HEENT:  Atraumatic, Normocephalic, moist mucous membranes  THYROID: Normal size, no palpable nodules  RESPIRATORY: Clear to auscultation bilaterally; No rales, rhonchi, wheezing  CARDIOVASCULAR: Regular rate and rhythm; No murmurs  GI: Soft, nontender, non distended, normal bowel sounds  SKIN: Dry, intact, No rashes or lesions  NEURO: AOx3, moves all extremities spontaneously   PSYCH: Reactive affect, euthymic mood    POCT Blood Glucose.: 299 mg/dL (12-25-21 @ 12:18)  POCT Blood Glucose.: 203 mg/dL (12-25-21 @ 08:57)  POCT Blood Glucose.: 189 mg/dL (12-25-21 @ 00:07)  POCT Blood Glucose.: 169 mg/dL (12-24-21 @ 21:54)  POCT Blood Glucose.: 289 mg/dL (12-24-21 @ 12:59)                            12.1   5.49  )-----------( 170      ( 25 Dec 2021 07:04 )             35.6       12-25    132<L>  |  100  |  7   ----------------------------<  228<H>  4.3   |  20<L>  |  0.47<L>    EGFR if : 119  EGFR if non : 103    Ca    9.0      12-25  Mg     2.4     12-25  Phos  2.8     12-25    TPro  6.5  /  Alb  4.0  /  TBili  0.4  /  DBili  x   /  AST  32  /  ALT  23  /  AlkPhos  71  12-25      Thyroid Function Tests:  12-25 @ 09:24 TSH 2.92 FreeT4 -- T3 -- Anti TPO -- Anti Thyroglobulin Ab -- TSI --      12-25 Chol 146 Direct LDL -- LDL calculated 52 HDL 86 Trig 38        Radiology:                HPI:  66F hx HTN, DM1, epilepsy on keppra/vimpat, vitiligo, chronic palpitations, pw 6 days constipation, lightheadedness, burning epigastric pain. Appears to be due to choledocholithiasis. Endocrine consulted for T1DM management.    DM diagnosis: T1DM, diagnosed years ago  Last A1c: 7.7% last month  Endocrinologist: Dr. Mc in [facility] DM meds: Novolog 8 units TIDac, Basaglar 2 units qhs  Adherence: Never misses doses. Rotates injection sites and holds for 10 seconds on injection.  Microvascular complications: No renal complications, no recent dilated eye exam but no known retinopathy, no neuropathy  Macrovascular complications: No CVA, MI, PAD  SMBG: 3x/day. Last couple months goes up into the 400s in the morning and later in the day as well. No lows. No blurred vision but having polydipsia and polyuria.  Diet at home: Eats a lot of rice. Tries to control portions. No soda or juice.  Appetite in the hospital: Finishing meals in hospital.    On additional ROS, reports occasional SOB. Gets exertional CP and palpitations.    PAST MEDICAL & SURGICAL HISTORY:  HLD (hyperlipidemia)    DM (diabetes mellitus)  Type 1        No significant past surgical history        FAMILY HISTORY:  FH: type 1 diabetes (Grandparent)  No known FHx of thyroid cancer.  Grandma with DM.    Social History: No tobacco or EtOH use.    Outpatient Medications: Home Medications:  Basaglar KwikPen 100 units/mL subcutaneous solution: 2 unit(s) subcutaneous once a day (at bedtime) (24 Dec 2021 22:22)  Crestor 20 mg oral tablet: 1 tab(s) orally once a day (24 Dec 2021 22:22)  Keppra 500 mg oral tablet: 1 tab(s) orally 2 times a day (24 Dec 2021 22:22)  metoprolol tartrate 25 mg oral tablet: 0.5 tab(s) orally once a day (24 Dec 2021 22:22)  NovoLOG 100 units/mL injectable solution: 8 unit(s) injectable 3 times a day (24 Dec 2021 22:22)  omeprazole 20 mg oral delayed release capsule: 1 cap(s) orally once a day (24 Dec 2021 22:22)  Vimpat 50 mg oral tablet: 1 tab(s) orally 2 times a day (24 Dec 2021 22:22)  Zetia 10 mg oral tablet: 1 tab(s) orally once a day (24 Dec 2021 22:22)      MEDICATIONS  (STANDING):  atorvastatin 40 milliGRAM(s) Oral at bedtime  dextrose 40% Gel 15 Gram(s) Oral once  dextrose 5%. 1000 milliLiter(s) (50 mL/Hr) IV Continuous <Continuous>  dextrose 5%. 1000 milliLiter(s) (100 mL/Hr) IV Continuous <Continuous>  dextrose 50% Injectable 25 Gram(s) IV Push once  dextrose 50% Injectable 12.5 Gram(s) IV Push once  dextrose 50% Injectable 25 Gram(s) IV Push once  glucagon  Injectable 1 milliGRAM(s) IntraMuscular once  influenza  Vaccine (HIGH DOSE) 0.7 milliLiter(s) IntraMuscular once  insulin glargine Injectable (LANTUS) 2 Unit(s) SubCutaneous at bedtime  insulin lispro (ADMELOG) corrective regimen sliding scale   SubCutaneous three times a day before meals  insulin lispro (ADMELOG) corrective regimen sliding scale   SubCutaneous at bedtime  insulin lispro Injectable (ADMELOG) 5 Unit(s) SubCutaneous three times a day before meals  lacosamide 50 milliGRAM(s) Oral two times a day  lactated ringers. 1000 milliLiter(s) (100 mL/Hr) IV Continuous <Continuous>  levETIRAcetam 500 milliGRAM(s) Oral two times a day  pantoprazole    Tablet 40 milliGRAM(s) Oral two times a day  polyethylene glycol 3350 17 Gram(s) Oral two times a day  senna 2 Tablet(s) Oral at bedtime    MEDICATIONS  (PRN):      Allergies    penicillin (Headache)    Intolerances      Review of Systems:  Constitutional:  No fever, No chills.  Eye:  No eye pain, No blurring.   Ear/Nose/Mouth/Throat:  No nasal congestion, No sore throat.   Respiratory:  + shortness of breath, No cough, No sputum production, No hemoptysis.   Cardiovascular:  + chest pain, + palpitations.   Gastrointestinal:  + abd pain but no nausea or vomiting  Genitourinary:  No dysuria, No hematuria.   Endocrine:  + excessive thirst, + polyuria.   Musculoskeletal:  No back pain, No decreased range of motion.   Integumentary:  No rash, + vitiligo.  Neurologic:  Alert and oriented X4, No confusion currently, No numbness, No tingling.   Additional ROS reviewed and negative except as indicated in HPI.        PHYSICAL EXAM:  VITALS: T(C): 36.7 (12-25-21 @ 11:15)  T(F): 98 (12-25-21 @ 11:15), Max: 98 (12-25-21 @ 11:15)  HR: 73 (12-25-21 @ 11:15) (68 - 90)  BP: 96/58 (12-25-21 @ 11:15) (96/58 - 138/73)  RR:  (16 - 18)  SpO2:  (98% - 100%)  Wt(kg): --  General: Well-developed female, No acute distress, Speaking full sentences.   Eye:  Extraocular movements are intact, No proptosis or lid lag.   HENT:  Normocephalic.   Neck:  Supple, Non-tender.   Respiratory:  Respirations are non-labored, Symmetric chest wall expansion, Breath sounds are equal.   Cardiovascular:  Normal rate, Regular rhythm, No edema.  Gastrointestinal:  Soft, minimal epigastric tenderness to palpation, Non-distended.   Musculoskeletal:  Normal range of motion, No gross joint swelling.   Feet:  Normal by visual exam, Normal pulses, No ulcers.   Integumentary:  Warm, dry. + vitiligo.  Mental Status Exam:  Orientedx4, Speech clear and coherent.   Neurologic:  Alert, Orientedx4, Normal motor function, No focal deficits, Cranial Nerves II-XII are grossly intact bilaterally.   Psychiatric:  Cooperative, Appropriate mood & affect.    POCT Blood Glucose.: 299 mg/dL (12-25-21 @ 12:18)  POCT Blood Glucose.: 203 mg/dL (12-25-21 @ 08:57)  POCT Blood Glucose.: 189 mg/dL (12-25-21 @ 00:07)  POCT Blood Glucose.: 169 mg/dL (12-24-21 @ 21:54)  POCT Blood Glucose.: 289 mg/dL (12-24-21 @ 12:59)                            12.1   5.49  )-----------( 170      ( 25 Dec 2021 07:04 )             35.6       12-25    132<L>  |  100  |  7   ----------------------------<  228<H>  4.3   |  20<L>  |  0.47<L>    EGFR if : 119  EGFR if non : 103    Ca    9.0      12-25  Mg     2.4     12-25  Phos  2.8     12-25    TPro  6.5  /  Alb  4.0  /  TBili  0.4  /  DBili  x   /  AST  32  /  ALT  23  /  AlkPhos  71  12-25      Thyroid Function Tests:  12-25 @ 09:24 TSH 2.92 FreeT4 -- T3 -- Anti TPO -- Anti Thyroglobulin Ab -- TSI --      12-25 Chol 146 Direct LDL -- LDL calculated 52 HDL 86 Trig 38        Radiology:

## 2021-12-26 PROCEDURE — 99232 SBSQ HOSP IP/OBS MODERATE 35: CPT

## 2021-12-26 RX ORDER — INSULIN GLARGINE 100 [IU]/ML
3 INJECTION, SOLUTION SUBCUTANEOUS AT BEDTIME
Refills: 0 | Status: DISCONTINUED | OUTPATIENT
Start: 2021-12-26 | End: 2021-12-27

## 2021-12-26 RX ORDER — INSULIN LISPRO 100/ML
VIAL (ML) SUBCUTANEOUS EVERY 6 HOURS
Refills: 0 | Status: DISCONTINUED | OUTPATIENT
Start: 2021-12-26 | End: 2021-12-28

## 2021-12-26 RX ADMIN — PANTOPRAZOLE SODIUM 40 MILLIGRAM(S): 20 TABLET, DELAYED RELEASE ORAL at 17:11

## 2021-12-26 RX ADMIN — POLYETHYLENE GLYCOL 3350 17 GRAM(S): 17 POWDER, FOR SOLUTION ORAL at 17:12

## 2021-12-26 RX ADMIN — Medication 3: at 12:37

## 2021-12-26 RX ADMIN — PANTOPRAZOLE SODIUM 40 MILLIGRAM(S): 20 TABLET, DELAYED RELEASE ORAL at 05:09

## 2021-12-26 RX ADMIN — Medication 5 UNIT(S): at 12:37

## 2021-12-26 RX ADMIN — LEVETIRACETAM 500 MILLIGRAM(S): 250 TABLET, FILM COATED ORAL at 05:08

## 2021-12-26 RX ADMIN — INSULIN GLARGINE 3 UNIT(S): 100 INJECTION, SOLUTION SUBCUTANEOUS at 22:55

## 2021-12-26 RX ADMIN — LACOSAMIDE 50 MILLIGRAM(S): 50 TABLET ORAL at 21:11

## 2021-12-26 RX ADMIN — Medication 5 UNIT(S): at 09:18

## 2021-12-26 RX ADMIN — LACOSAMIDE 50 MILLIGRAM(S): 50 TABLET ORAL at 11:19

## 2021-12-26 RX ADMIN — Medication 5 UNIT(S): at 18:07

## 2021-12-26 RX ADMIN — ATORVASTATIN CALCIUM 40 MILLIGRAM(S): 80 TABLET, FILM COATED ORAL at 21:12

## 2021-12-26 RX ADMIN — LEVETIRACETAM 500 MILLIGRAM(S): 250 TABLET, FILM COATED ORAL at 17:11

## 2021-12-26 NOTE — PROGRESS NOTE ADULT - ASSESSMENT
66F c hx HTN, DM1, epilepsy on keppra/vimpat, vitilogo, chronic palpitations, pw 6 days constipation and abdominal pain with work up revealing gallstones and choledocholithiasis with 4 mm stone at the level of the pancreas    #Abd pain  #choledocholithiasis with 4 mm stone at the level of the pancreas  Most likely source of abdominal pain. slight elevation in lipase and nl finding of pancreas in CT, thus no concern fro pancreatitis  Reports sxs improvement.     #Constipation    Recommendations:  -continue Bowel regimen  -Will discuss with advance attending tomorrow regarding timing for ERCP. In anticipation of it occurring early next week, please obtain COVID swab tonight and make NPO after midnight today.    Chacorta Viramontes MD  Gastroenterology/Hepatology Fellow  Contact on-call GI fellow via answering service ([phone]) from 5pm-8am AND on weekends/holidays     66F c hx HTN, DM1, epilepsy on keppra/vimpat, vitilogo, chronic palpitations, pw 6 days constipation and abdominal pain with work up revealing gallstones and choledocholithiasis with 4 mm stone at the level of the pancreas    #Abd pain  #choledocholithiasis with 4 mm stone at the level of the pancreas  Most likely source of abdominal pain. slight elevation in lipase and nl finding of pancreas in CT, thus no concern fro pancreatitis  Reports sxs improvement.     #Constipation    Recommendations:  -Trend CMP daily  -continue Bowel regimen  -Will discuss with advance attending tomorrow regarding timing for ERCP. In anticipation of it occurring early next week, please obtain COVID swab tonight and make NPO after midnight today.    Chacorta Viramontes MD  Gastroenterology/Hepatology Fellow  Contact on-call GI fellow via answering service ([phone]) from 5pm-8am AND on weekends/holidays

## 2021-12-26 NOTE — PROGRESS NOTE ADULT - ATTENDING COMMENTS
66F with choledocholithiasis  Normal liver tests but intermittent abd pain  Will plan for ERCP with stone removal likely tomorrow (tues) given endoscopy schedule  NPO after midnight tonight  Hold any AC  Trend liver tests     Thank you for this interesting consult.  Please call the advanced GI service with any questions or concerns. Pt. seen, examined and d/w fellow. Agree with above note. Pt. is a 66F c hx HTN, DM1, epilepsy on keppra/vimpat, vitilogo, chronic palpitations, pw 6 days constipation and abdominal pain with work up revealing gallstones and choledocholithiasis with 4 mm stone at the level of the pancreas. Will discuss with advanced attending for ? ERCP next week.    Gabo Kay MD  [facility] GI

## 2021-12-26 NOTE — PROGRESS NOTE ADULT - ASSESSMENT
66F hx HTN, DM1, epilepsy on keppra/vimpat, vitiligo, chronic palpitations, pw 6 days constipation, lightheadedness, burning epigastric pain. Appears to be due to choledocholithiasis. Endocrine consulted for T1DM management.    Poorly controlled T1DM with hyperglycemia  A1c 7.7% last month per patient.  -Decrease Lantus to 3 units at bedtime tonight as patient went low after increase to 8 units last night and will be NPO after midnight tonight for possible ERCP tomorrow. DO NOT HOLD LANTUS WHEN NPO.  -Continue Admelog 5 units TID pre-meal. HOLD IF NPO.  -Use low correction scale pre-meal when eating  -Use low correction scale at bedtime when eating  -Use low correction scale q6h when NPO  -Fingerstick BG before meals and bedtime when eating, q6h when NPO  -Goal -180  -Carbohydrate consistent diet  Discharge plan:  -Likely to discharge patient home on basal/bolus insulin. Final regimen pending clinical course.  -Recommend outpatient dilated eye exam and endocrinology f/u outpatient    HTN  -Outpatient goal BP <130/80. Management per primary team.    HLD  -On Crestor 20mg daily and Zetia 10 mg    Hector Leon DO, Endocrinology Fellow  Pager [phone] from 9am to 5pm. After hours and on weekends, please call [phone].

## 2021-12-27 LAB
ALBUMIN SERPL ELPH-MCNC: 3.9 G/DL — SIGNIFICANT CHANGE UP (ref 3.3–5)
ALP SERPL-CCNC: 70 U/L — SIGNIFICANT CHANGE UP (ref 40–120)
ALT FLD-CCNC: 17 U/L — SIGNIFICANT CHANGE UP (ref 10–45)
ANION GAP SERPL CALC-SCNC: 11 MMOL/L — SIGNIFICANT CHANGE UP (ref 5–17)
AST SERPL-CCNC: 21 U/L — SIGNIFICANT CHANGE UP (ref 10–40)
BILIRUB SERPL-MCNC: 0.2 MG/DL — SIGNIFICANT CHANGE UP (ref 0.2–1.2)
BUN SERPL-MCNC: 18 MG/DL — SIGNIFICANT CHANGE UP (ref 7–23)
CALCIUM SERPL-MCNC: 9.1 MG/DL — SIGNIFICANT CHANGE UP (ref 8.4–10.5)
CHLORIDE SERPL-SCNC: 100 MMOL/L — SIGNIFICANT CHANGE UP (ref 96–108)
CO2 SERPL-SCNC: 25 MMOL/L — SIGNIFICANT CHANGE UP (ref 22–31)
CREAT SERPL-MCNC: 0.72 MG/DL — SIGNIFICANT CHANGE UP (ref 0.5–1.3)
GLUCOSE BLDC GLUCOMTR-MCNC: 152 MG/DL — HIGH (ref 70–99)
GLUCOSE BLDC GLUCOMTR-MCNC: 356 MG/DL — HIGH (ref 70–99)
GLUCOSE SERPL-MCNC: 85 MG/DL — SIGNIFICANT CHANGE UP (ref 70–99)
POTASSIUM SERPL-MCNC: 4 MMOL/L — SIGNIFICANT CHANGE UP (ref 3.5–5.3)
POTASSIUM SERPL-SCNC: 4 MMOL/L — SIGNIFICANT CHANGE UP (ref 3.5–5.3)
PROT SERPL-MCNC: 6.3 G/DL — SIGNIFICANT CHANGE UP (ref 6–8.3)
SARS-COV-2 RNA SPEC QL NAA+PROBE: SIGNIFICANT CHANGE UP
SODIUM SERPL-SCNC: 136 MMOL/L — SIGNIFICANT CHANGE UP (ref 135–145)

## 2021-12-27 PROCEDURE — 99232 SBSQ HOSP IP/OBS MODERATE 35: CPT | Mod: GC

## 2021-12-27 PROCEDURE — 99232 SBSQ HOSP IP/OBS MODERATE 35: CPT

## 2021-12-27 PROCEDURE — 74183 MRI ABD W/O CNTR FLWD CNTR: CPT | Mod: 26

## 2021-12-27 PROCEDURE — 99233 SBSQ HOSP IP/OBS HIGH 50: CPT

## 2021-12-27 RX ORDER — HEPARIN SODIUM 5000 [USP'U]/ML
5000 INJECTION INTRAVENOUS; SUBCUTANEOUS EVERY 12 HOURS
Refills: 0 | Status: DISCONTINUED | OUTPATIENT
Start: 2021-12-27 | End: 2021-12-31

## 2021-12-27 RX ORDER — INSULIN GLARGINE 100 [IU]/ML
3 INJECTION, SOLUTION SUBCUTANEOUS AT BEDTIME
Refills: 0 | Status: DISCONTINUED | OUTPATIENT
Start: 2021-12-27 | End: 2021-12-31

## 2021-12-27 RX ADMIN — PANTOPRAZOLE SODIUM 40 MILLIGRAM(S): 20 TABLET, DELAYED RELEASE ORAL at 17:22

## 2021-12-27 RX ADMIN — POLYETHYLENE GLYCOL 3350 17 GRAM(S): 17 POWDER, FOR SOLUTION ORAL at 17:21

## 2021-12-27 RX ADMIN — LACOSAMIDE 50 MILLIGRAM(S): 50 TABLET ORAL at 23:36

## 2021-12-27 RX ADMIN — SENNA PLUS 2 TABLET(S): 8.6 TABLET ORAL at 21:57

## 2021-12-27 RX ADMIN — LACOSAMIDE 50 MILLIGRAM(S): 50 TABLET ORAL at 11:01

## 2021-12-27 RX ADMIN — LEVETIRACETAM 500 MILLIGRAM(S): 250 TABLET, FILM COATED ORAL at 17:22

## 2021-12-27 RX ADMIN — PANTOPRAZOLE SODIUM 40 MILLIGRAM(S): 20 TABLET, DELAYED RELEASE ORAL at 05:22

## 2021-12-27 RX ADMIN — Medication 1: at 17:42

## 2021-12-27 RX ADMIN — Medication 1 TABLET(S): at 12:04

## 2021-12-27 RX ADMIN — HEPARIN SODIUM 5000 UNIT(S): 5000 INJECTION INTRAVENOUS; SUBCUTANEOUS at 17:22

## 2021-12-27 RX ADMIN — ATORVASTATIN CALCIUM 40 MILLIGRAM(S): 80 TABLET, FILM COATED ORAL at 21:57

## 2021-12-27 RX ADMIN — INSULIN GLARGINE 3 UNIT(S): 100 INJECTION, SOLUTION SUBCUTANEOUS at 22:34

## 2021-12-27 RX ADMIN — LEVETIRACETAM 500 MILLIGRAM(S): 250 TABLET, FILM COATED ORAL at 05:22

## 2021-12-27 NOTE — PROGRESS NOTE ADULT - PROBLEM SELECTOR PLAN 5
ambulatory  dispo back to home once GI clears    PPX  Lovenox. ambulatory  dispo back to home once GI clears    PPX  HSQ

## 2021-12-27 NOTE — DIETITIAN INITIAL EVALUATION ADULT. - PERTINENT LABORATORY DATA
12-27 @ 07:15: Na 136, BUN 18, Cr 0.72, BG 85, K+ 4.0, Phos --, Mg --, Alk Phos 70, ALT/SGPT 17, AST/SGOT 21, HbA1c --

## 2021-12-27 NOTE — PROGRESS NOTE ADULT - ASSESSMENT
66F w/h/o uncontrolled T1DM on basal/bolus insulin. No known DM complications. Has not seen ophthalmologist in the last year. Also h/o HTN, epilepsy on keppra/vimpat, vitiligo, chronic palpitations. Here with 6 days of  constipation, lightheadedness, burning epigastric pain. Appears to be due to acute choledocholithiasis. Endocrine consulted for T1DM management. NPO post mn for ERCP today with fasting BG <100s but not hypoglycemia. Will adjust insulin doses to BG goal 100 to 180s.     Pt reports she wears Freestyle Cecilia 14 with on and off hypo and mostly hyperglycemia while at home. On basal/bolus therapy for many years. Her endocrinologist recommended evaluation for insulin pump but pt declined. Pt states she doesn't want to start insulin pump. Pt has never received DM education by RD or CDE and doesn't know how to count carbs.     Met with patient and reviewed the following:  -A1c LEVEL: Present and goal  -Blood glucose goals: 100s to 150s as out pt  -Glucose monitoring frequency: ac and hs  -Hypoglycemia prevention, detection and treatment  -Healthy eating and portion control. Doesn't count carbs. Basic carb education given. Will provide with carb counting educational material but pt to practice with hospital menu to count carbs.    -Insulin(s) action, time of administration and side effects  -Importance of follow up care with pvt enod to refer to RD or CDE.  -Has old Freestyle cecilia 14 will update to Freestyle 2 so pt can set alarms for hypo/hyperglycemia.   Pt able to give teach back about the above and appears motivated to learn carb counting so she can adjust premeal insulin more accurately.    Needs RD consult.

## 2021-12-27 NOTE — DIETITIAN NUTRITION RISK NOTIFICATION - TREATMENT: THE FOLLOWING DIET HAS BEEN RECOMMENDED
Diet, NPO after Midnight:      NPO Start Date: 26-Dec-2021,   NPO Start Time: 23:59 (12-26-21 @ 18:46) [Active]  Diet, DASH/TLC:   Sodium & Cholesterol Restricted  Consistent Carbohydrate {Evening Snack} (CSTCHOSN) (12-24-21 @ 22:43) [Active]

## 2021-12-27 NOTE — CHART NOTE - NSCHARTNOTEFT_GEN_A_CORE
Brief GI update  ==========  Plan for ERCP tomorrow.    - Please make NPO midnight.  - 3AM labs including CBC, INR and CMP.    Thank you for involving us in the care of this patient. Please reach out if any further questions.    Enrique Barajas, PGY-5  GI Fellow    Available on [employer] Teams  Pager [phone] ([facility]) or [phone] ([facility])  After 5PM/Weekends, please contact the on-call GI fellow: [phone]  Available through [employer] Teams

## 2021-12-27 NOTE — DIETITIAN INITIAL EVALUATION ADULT. - PERTINENT MEDS FT
MEDICATIONS  (STANDING):  atorvastatin 40 milliGRAM(s) Oral at bedtime  dextrose 40% Gel 15 Gram(s) Oral once  dextrose 50% Injectable 25 Gram(s) IV Push once  dextrose 50% Injectable 12.5 Gram(s) IV Push once  dextrose 50% Injectable 25 Gram(s) IV Push once  influenza  Vaccine (HIGH DOSE) 0.7 milliLiter(s) IntraMuscular once  insulin glargine Injectable (LANTUS) 3 Unit(s) SubCutaneous at bedtime  insulin lispro (ADMELOG) corrective regimen sliding scale   SubCutaneous every 6 hours  insulin lispro Injectable (ADMELOG) 5 Unit(s) SubCutaneous three times a day before meals  lacosamide 50 milliGRAM(s) Oral two times a day  levETIRAcetam 500 milliGRAM(s) Oral two times a day  multivitamin 1 Tablet(s) Oral daily  pantoprazole    Tablet 40 milliGRAM(s) Oral two times a day  polyethylene glycol 3350 17 Gram(s) Oral two times a day  senna 2 Tablet(s) Oral at bedtime    MEDICATIONS  (PRN):

## 2021-12-28 LAB
ALBUMIN SERPL ELPH-MCNC: 3.6 G/DL — SIGNIFICANT CHANGE UP (ref 3.3–5)
ALP SERPL-CCNC: 72 U/L — SIGNIFICANT CHANGE UP (ref 40–120)
ALT FLD-CCNC: 20 U/L — SIGNIFICANT CHANGE UP (ref 10–45)
ANION GAP SERPL CALC-SCNC: 11 MMOL/L — SIGNIFICANT CHANGE UP (ref 5–17)
ANION GAP SERPL CALC-SCNC: 18 MMOL/L — HIGH (ref 5–17)
APTT BLD: 27.8 SEC — SIGNIFICANT CHANGE UP (ref 27.5–35.5)
AST SERPL-CCNC: 26 U/L — SIGNIFICANT CHANGE UP (ref 10–40)
B-OH-BUTYR SERPL-SCNC: 0.9 MMOL/L — HIGH
BASE EXCESS BLDV CALC-SCNC: -1.1 MMOL/L — SIGNIFICANT CHANGE UP (ref -2–2)
BILIRUB SERPL-MCNC: 0.3 MG/DL — SIGNIFICANT CHANGE UP (ref 0.2–1.2)
BUN SERPL-MCNC: 14 MG/DL — SIGNIFICANT CHANGE UP (ref 7–23)
BUN SERPL-MCNC: 16 MG/DL — SIGNIFICANT CHANGE UP (ref 7–23)
CA-I SERPL-SCNC: 1.14 MMOL/L — LOW (ref 1.15–1.33)
CALCIUM SERPL-MCNC: 9 MG/DL — SIGNIFICANT CHANGE UP (ref 8.4–10.5)
CALCIUM SERPL-MCNC: 9.2 MG/DL — SIGNIFICANT CHANGE UP (ref 8.4–10.5)
CHLORIDE BLDV-SCNC: 88 MMOL/L — LOW (ref 96–108)
CHLORIDE SERPL-SCNC: 86 MMOL/L — LOW (ref 96–108)
CHLORIDE SERPL-SCNC: 97 MMOL/L — SIGNIFICANT CHANGE UP (ref 96–108)
CO2 BLDV-SCNC: 26 MMOL/L — SIGNIFICANT CHANGE UP (ref 22–26)
CO2 SERPL-SCNC: 21 MMOL/L — LOW (ref 22–31)
CO2 SERPL-SCNC: 22 MMOL/L — SIGNIFICANT CHANGE UP (ref 22–31)
CREAT SERPL-MCNC: 0.91 MG/DL — SIGNIFICANT CHANGE UP (ref 0.5–1.3)
CREAT SERPL-MCNC: 0.93 MG/DL — SIGNIFICANT CHANGE UP (ref 0.5–1.3)
GAS PNL BLDV: 122 MMOL/L — LOW (ref 136–145)
GAS PNL BLDV: SIGNIFICANT CHANGE UP
GAS PNL BLDV: SIGNIFICANT CHANGE UP
GLUCOSE BLDC GLUCOMTR-MCNC: 147 MG/DL — HIGH (ref 70–99)
GLUCOSE BLDC GLUCOMTR-MCNC: 170 MG/DL — HIGH (ref 70–99)
GLUCOSE BLDC GLUCOMTR-MCNC: 196 MG/DL — HIGH (ref 70–99)
GLUCOSE BLDC GLUCOMTR-MCNC: 226 MG/DL — HIGH (ref 70–99)
GLUCOSE BLDC GLUCOMTR-MCNC: 233 MG/DL — HIGH (ref 70–99)
GLUCOSE BLDC GLUCOMTR-MCNC: 427 MG/DL — HIGH (ref 70–99)
GLUCOSE BLDC GLUCOMTR-MCNC: 544 MG/DL — CRITICAL HIGH (ref 70–99)
GLUCOSE BLDC GLUCOMTR-MCNC: 564 MG/DL — CRITICAL HIGH (ref 70–99)
GLUCOSE BLDC GLUCOMTR-MCNC: 78 MG/DL — SIGNIFICANT CHANGE UP (ref 70–99)
GLUCOSE BLDV-MCNC: >660 MG/DL — CRITICAL HIGH (ref 70–99)
GLUCOSE SERPL-MCNC: 214 MG/DL — HIGH (ref 70–99)
GLUCOSE SERPL-MCNC: 600 MG/DL — CRITICAL HIGH (ref 70–99)
HCO3 BLDV-SCNC: 24 MMOL/L — SIGNIFICANT CHANGE UP (ref 22–29)
HCT VFR BLD CALC: 36.2 % — SIGNIFICANT CHANGE UP (ref 34.5–45)
HCT VFR BLDA CALC: 41 % — SIGNIFICANT CHANGE UP (ref 34.5–46.5)
HGB BLD CALC-MCNC: 13.5 G/DL — SIGNIFICANT CHANGE UP (ref 11.7–16.1)
HGB BLD-MCNC: 12.5 G/DL — SIGNIFICANT CHANGE UP (ref 11.5–15.5)
INR BLD: 1 RATIO — SIGNIFICANT CHANGE UP (ref 0.88–1.16)
LACTATE BLDV-MCNC: 2.2 MMOL/L — HIGH (ref 0.7–2)
MCHC RBC-ENTMCNC: 28.9 PG — SIGNIFICANT CHANGE UP (ref 27–34)
MCHC RBC-ENTMCNC: 34.5 GM/DL — SIGNIFICANT CHANGE UP (ref 32–36)
MCV RBC AUTO: 83.8 FL — SIGNIFICANT CHANGE UP (ref 80–100)
NRBC # BLD: 0 /100 WBCS — SIGNIFICANT CHANGE UP (ref 0–0)
PCO2 BLDV: 42 MMHG — SIGNIFICANT CHANGE UP (ref 39–42)
PH BLDV: 7.37 — SIGNIFICANT CHANGE UP (ref 7.32–7.43)
PLATELET # BLD AUTO: 151 K/UL — SIGNIFICANT CHANGE UP (ref 150–400)
PO2 BLDV: 47 MMHG — HIGH (ref 25–45)
POTASSIUM BLDV-SCNC: 5 MMOL/L — SIGNIFICANT CHANGE UP (ref 3.5–5.1)
POTASSIUM SERPL-MCNC: 3.6 MMOL/L — SIGNIFICANT CHANGE UP (ref 3.5–5.3)
POTASSIUM SERPL-MCNC: 5 MMOL/L — SIGNIFICANT CHANGE UP (ref 3.5–5.3)
POTASSIUM SERPL-SCNC: 3.6 MMOL/L — SIGNIFICANT CHANGE UP (ref 3.5–5.3)
POTASSIUM SERPL-SCNC: 5 MMOL/L — SIGNIFICANT CHANGE UP (ref 3.5–5.3)
PROT SERPL-MCNC: 6.4 G/DL — SIGNIFICANT CHANGE UP (ref 6–8.3)
PROTHROM AB SERPL-ACNC: 12 SEC — SIGNIFICANT CHANGE UP (ref 10.6–13.6)
RBC # BLD: 4.32 M/UL — SIGNIFICANT CHANGE UP (ref 3.8–5.2)
RBC # FLD: 12.8 % — SIGNIFICANT CHANGE UP (ref 10.3–14.5)
SAO2 % BLDV: 77.4 % — SIGNIFICANT CHANGE UP (ref 67–88)
SODIUM SERPL-SCNC: 125 MMOL/L — LOW (ref 135–145)
SODIUM SERPL-SCNC: 130 MMOL/L — LOW (ref 135–145)
WBC # BLD: 4.26 K/UL — SIGNIFICANT CHANGE UP (ref 3.8–10.5)
WBC # FLD AUTO: 4.26 K/UL — SIGNIFICANT CHANGE UP (ref 3.8–10.5)

## 2021-12-28 PROCEDURE — 43264 ERCP REMOVE DUCT CALCULI: CPT

## 2021-12-28 PROCEDURE — 99232 SBSQ HOSP IP/OBS MODERATE 35: CPT

## 2021-12-28 PROCEDURE — 43262 ENDO CHOLANGIOPANCREATOGRAPH: CPT | Mod: 59

## 2021-12-28 PROCEDURE — 99233 SBSQ HOSP IP/OBS HIGH 50: CPT

## 2021-12-28 RX ORDER — INDOMETHACIN 50 MG
100 CAPSULE ORAL ONCE
Refills: 0 | Status: COMPLETED | OUTPATIENT
Start: 2021-12-28 | End: 2021-12-28

## 2021-12-28 RX ORDER — ACETAMINOPHEN 500 MG
600 TABLET ORAL ONCE
Refills: 0 | Status: COMPLETED | OUTPATIENT
Start: 2021-12-28 | End: 2021-12-28

## 2021-12-28 RX ORDER — SODIUM CHLORIDE 9 MG/ML
500 INJECTION, SOLUTION INTRAVENOUS ONCE
Refills: 0 | Status: COMPLETED | OUTPATIENT
Start: 2021-12-28 | End: 2021-12-28

## 2021-12-28 RX ORDER — DEXTROSE 50 % IN WATER 50 %
12.5 SYRINGE (ML) INTRAVENOUS ONCE
Refills: 0 | Status: COMPLETED | OUTPATIENT
Start: 2021-12-28 | End: 2021-12-28

## 2021-12-28 RX ORDER — GLUCAGON INJECTION, SOLUTION 0.5 MG/.1ML
1 INJECTION, SOLUTION SUBCUTANEOUS ONCE
Refills: 0 | Status: DISCONTINUED | OUTPATIENT
Start: 2021-12-28 | End: 2021-12-31

## 2021-12-28 RX ORDER — INSULIN LISPRO 100/ML
VIAL (ML) SUBCUTANEOUS
Refills: 0 | Status: DISCONTINUED | OUTPATIENT
Start: 2021-12-28 | End: 2021-12-29

## 2021-12-28 RX ORDER — ACETAMINOPHEN 500 MG
1000 TABLET ORAL ONCE
Refills: 0 | Status: DISCONTINUED | OUTPATIENT
Start: 2021-12-28 | End: 2021-12-28

## 2021-12-28 RX ORDER — SODIUM CHLORIDE 9 MG/ML
1000 INJECTION, SOLUTION INTRAVENOUS
Refills: 0 | Status: DISCONTINUED | OUTPATIENT
Start: 2021-12-28 | End: 2021-12-29

## 2021-12-28 RX ORDER — INSULIN LISPRO 100/ML
VIAL (ML) SUBCUTANEOUS AT BEDTIME
Refills: 0 | Status: DISCONTINUED | OUTPATIENT
Start: 2021-12-29 | End: 2021-12-31

## 2021-12-28 RX ORDER — SODIUM CHLORIDE 9 MG/ML
500 INJECTION, SOLUTION INTRAVENOUS
Refills: 0 | Status: COMPLETED | OUTPATIENT
Start: 2021-12-28 | End: 2021-12-28

## 2021-12-28 RX ADMIN — Medication 600 MILLIGRAM(S): at 18:45

## 2021-12-28 RX ADMIN — LEVETIRACETAM 500 MILLIGRAM(S): 250 TABLET, FILM COATED ORAL at 18:12

## 2021-12-28 RX ADMIN — LEVETIRACETAM 500 MILLIGRAM(S): 250 TABLET, FILM COATED ORAL at 05:42

## 2021-12-28 RX ADMIN — SODIUM CHLORIDE 1000 MILLILITER(S): 9 INJECTION, SOLUTION INTRAVENOUS at 19:07

## 2021-12-28 RX ADMIN — SODIUM CHLORIDE 30 MILLILITER(S): 9 INJECTION, SOLUTION INTRAVENOUS at 17:11

## 2021-12-28 RX ADMIN — HEPARIN SODIUM 5000 UNIT(S): 5000 INJECTION INTRAVENOUS; SUBCUTANEOUS at 18:12

## 2021-12-28 RX ADMIN — Medication 6: at 21:32

## 2021-12-28 RX ADMIN — ATORVASTATIN CALCIUM 40 MILLIGRAM(S): 80 TABLET, FILM COATED ORAL at 21:32

## 2021-12-28 RX ADMIN — POLYETHYLENE GLYCOL 3350 17 GRAM(S): 17 POWDER, FOR SOLUTION ORAL at 05:42

## 2021-12-28 RX ADMIN — Medication 6: at 00:40

## 2021-12-28 RX ADMIN — PANTOPRAZOLE SODIUM 40 MILLIGRAM(S): 20 TABLET, DELAYED RELEASE ORAL at 18:13

## 2021-12-28 RX ADMIN — Medication 240 MILLIGRAM(S): at 18:12

## 2021-12-28 RX ADMIN — PANTOPRAZOLE SODIUM 40 MILLIGRAM(S): 20 TABLET, DELAYED RELEASE ORAL at 05:42

## 2021-12-28 RX ADMIN — LACOSAMIDE 50 MILLIGRAM(S): 50 TABLET ORAL at 22:19

## 2021-12-28 RX ADMIN — Medication 12.5 GRAM(S): at 05:52

## 2021-12-28 RX ADMIN — SENNA PLUS 2 TABLET(S): 8.6 TABLET ORAL at 21:32

## 2021-12-28 RX ADMIN — Medication 100 MILLIGRAM(S): at 19:08

## 2021-12-28 RX ADMIN — Medication 1 TABLET(S): at 11:30

## 2021-12-28 RX ADMIN — LACOSAMIDE 50 MILLIGRAM(S): 50 TABLET ORAL at 11:30

## 2021-12-28 RX ADMIN — Medication 2: at 12:18

## 2021-12-28 RX ADMIN — Medication 30 MILLILITER(S): at 20:41

## 2021-12-28 RX ADMIN — POLYETHYLENE GLYCOL 3350 17 GRAM(S): 17 POWDER, FOR SOLUTION ORAL at 19:24

## 2021-12-28 RX ADMIN — INSULIN GLARGINE 3 UNIT(S): 100 INJECTION, SOLUTION SUBCUTANEOUS at 21:31

## 2021-12-28 NOTE — PROGRESS NOTE ADULT - ASSESSMENT
66F w/h/o uncontrolled T1DM on basal/bolus insulin. No known DM complications. Has not seen ophthalmologist in the last year. Also h/o HTN, epilepsy on keppra/vimpat, vitiligo, chronic palpitations. Here with 6 days of  constipation, lightheadedness, burning epigastric pain. Appears to be due to acute choledocholithiasis. Endocrine consulted for T1DM management. NPO post mn again today for ERCP with fasting BG <100s but not hypoglycemia. Pt's BG erratic at this time due to on and off hyper/hypoglycemia and on and off NPO status missing insulin doses as well. Spoke to primary team RN about the need to administer correction scale while NPO and also administer premeal insulin dose if pt is eating regardless of time of intake. Unable to make further insulin dose changes at this time. BG goal 100 to 180s.     Has old Freestyle sho 14 will update to Freestyle 2 so pt can set alarms for hypo/hyperglycemia prior discharge.   Reviewed basics of carb counting. Pt has difficulty counting some examples of carbs but was able to give correct answers as more examples were given. Needs RD to reinforce so pt can start carb counting at home and administer insulin based on 1:15 insulin to carb ratio and f/u with endo for adjustments.

## 2021-12-28 NOTE — PROVIDER CONTACT NOTE (CRITICAL VALUE NOTIFICATION) - ACTION/TREATMENT ORDERED:
labs BMP, Beta Hydroxy-Butyrate, VBG STAT and insulin as ordered awaiting endocrinology consult orders

## 2021-12-29 DIAGNOSIS — E87.1 HYPO-OSMOLALITY AND HYPONATREMIA: ICD-10-CM

## 2021-12-29 LAB
ANION GAP SERPL CALC-SCNC: 11 MMOL/L — SIGNIFICANT CHANGE UP (ref 5–17)
ANION GAP SERPL CALC-SCNC: 12 MMOL/L — SIGNIFICANT CHANGE UP (ref 5–17)
ANION GAP SERPL CALC-SCNC: 14 MMOL/L — SIGNIFICANT CHANGE UP (ref 5–17)
BUN SERPL-MCNC: 10 MG/DL — SIGNIFICANT CHANGE UP (ref 7–23)
BUN SERPL-MCNC: 11 MG/DL — SIGNIFICANT CHANGE UP (ref 7–23)
BUN SERPL-MCNC: 8 MG/DL — SIGNIFICANT CHANGE UP (ref 7–23)
CALCIUM SERPL-MCNC: 8.9 MG/DL — SIGNIFICANT CHANGE UP (ref 8.4–10.5)
CALCIUM SERPL-MCNC: 9.1 MG/DL — SIGNIFICANT CHANGE UP (ref 8.4–10.5)
CALCIUM SERPL-MCNC: 9.2 MG/DL — SIGNIFICANT CHANGE UP (ref 8.4–10.5)
CHLORIDE SERPL-SCNC: 93 MMOL/L — LOW (ref 96–108)
CHLORIDE SERPL-SCNC: 95 MMOL/L — LOW (ref 96–108)
CHLORIDE SERPL-SCNC: 95 MMOL/L — LOW (ref 96–108)
CO2 SERPL-SCNC: 21 MMOL/L — LOW (ref 22–31)
CO2 SERPL-SCNC: 22 MMOL/L — SIGNIFICANT CHANGE UP (ref 22–31)
CO2 SERPL-SCNC: 24 MMOL/L — SIGNIFICANT CHANGE UP (ref 22–31)
CREAT SERPL-MCNC: 0.63 MG/DL — SIGNIFICANT CHANGE UP (ref 0.5–1.3)
CREAT SERPL-MCNC: 0.66 MG/DL — SIGNIFICANT CHANGE UP (ref 0.5–1.3)
CREAT SERPL-MCNC: 0.67 MG/DL — SIGNIFICANT CHANGE UP (ref 0.5–1.3)
GLUCOSE BLDC GLUCOMTR-MCNC: 122 MG/DL — HIGH (ref 70–99)
GLUCOSE BLDC GLUCOMTR-MCNC: 178 MG/DL — HIGH (ref 70–99)
GLUCOSE BLDC GLUCOMTR-MCNC: 218 MG/DL — HIGH (ref 70–99)
GLUCOSE BLDC GLUCOMTR-MCNC: 241 MG/DL — HIGH (ref 70–99)
GLUCOSE BLDC GLUCOMTR-MCNC: 258 MG/DL — HIGH (ref 70–99)
GLUCOSE BLDC GLUCOMTR-MCNC: 346 MG/DL — HIGH (ref 70–99)
GLUCOSE BLDC GLUCOMTR-MCNC: 39 MG/DL — CRITICAL LOW (ref 70–99)
GLUCOSE BLDC GLUCOMTR-MCNC: 46 MG/DL — CRITICAL LOW (ref 70–99)
GLUCOSE BLDC GLUCOMTR-MCNC: 48 MG/DL — CRITICAL LOW (ref 70–99)
GLUCOSE SERPL-MCNC: 109 MG/DL — HIGH (ref 70–99)
GLUCOSE SERPL-MCNC: 130 MG/DL — HIGH (ref 70–99)
GLUCOSE SERPL-MCNC: 235 MG/DL — HIGH (ref 70–99)
HCT VFR BLD CALC: 35.5 % — SIGNIFICANT CHANGE UP (ref 34.5–45)
HGB BLD-MCNC: 12.3 G/DL — SIGNIFICANT CHANGE UP (ref 11.5–15.5)
MCHC RBC-ENTMCNC: 28.7 PG — SIGNIFICANT CHANGE UP (ref 27–34)
MCHC RBC-ENTMCNC: 34.6 GM/DL — SIGNIFICANT CHANGE UP (ref 32–36)
MCV RBC AUTO: 82.9 FL — SIGNIFICANT CHANGE UP (ref 80–100)
NRBC # BLD: 0 /100 WBCS — SIGNIFICANT CHANGE UP (ref 0–0)
OSMOLALITY UR: 142 MOS/KG — LOW (ref 300–900)
PLATELET # BLD AUTO: 159 K/UL — SIGNIFICANT CHANGE UP (ref 150–400)
POTASSIUM SERPL-MCNC: 3.6 MMOL/L — SIGNIFICANT CHANGE UP (ref 3.5–5.3)
POTASSIUM SERPL-MCNC: 4.5 MMOL/L — SIGNIFICANT CHANGE UP (ref 3.5–5.3)
POTASSIUM SERPL-MCNC: 4.5 MMOL/L — SIGNIFICANT CHANGE UP (ref 3.5–5.3)
POTASSIUM SERPL-SCNC: 3.6 MMOL/L — SIGNIFICANT CHANGE UP (ref 3.5–5.3)
POTASSIUM SERPL-SCNC: 4.5 MMOL/L — SIGNIFICANT CHANGE UP (ref 3.5–5.3)
POTASSIUM SERPL-SCNC: 4.5 MMOL/L — SIGNIFICANT CHANGE UP (ref 3.5–5.3)
RBC # BLD: 4.28 M/UL — SIGNIFICANT CHANGE UP (ref 3.8–5.2)
RBC # FLD: 12.4 % — SIGNIFICANT CHANGE UP (ref 10.3–14.5)
SODIUM SERPL-SCNC: 128 MMOL/L — LOW (ref 135–145)
SODIUM SERPL-SCNC: 129 MMOL/L — LOW (ref 135–145)
SODIUM SERPL-SCNC: 130 MMOL/L — LOW (ref 135–145)
SODIUM UR-SCNC: 25 MMOL/L — SIGNIFICANT CHANGE UP
WBC # BLD: 5.43 K/UL — SIGNIFICANT CHANGE UP (ref 3.8–10.5)
WBC # FLD AUTO: 5.43 K/UL — SIGNIFICANT CHANGE UP (ref 3.8–10.5)

## 2021-12-29 PROCEDURE — 99233 SBSQ HOSP IP/OBS HIGH 50: CPT

## 2021-12-29 PROCEDURE — 99232 SBSQ HOSP IP/OBS MODERATE 35: CPT

## 2021-12-29 PROCEDURE — 99232 SBSQ HOSP IP/OBS MODERATE 35: CPT | Mod: GC

## 2021-12-29 RX ORDER — INSULIN LISPRO 100/ML
5 VIAL (ML) SUBCUTANEOUS
Refills: 0 | Status: DISCONTINUED | OUTPATIENT
Start: 2021-12-29 | End: 2021-12-31

## 2021-12-29 RX ORDER — LACTULOSE 10 G/15ML
20 SOLUTION ORAL ONCE
Refills: 0 | Status: COMPLETED | OUTPATIENT
Start: 2021-12-29 | End: 2021-12-29

## 2021-12-29 RX ORDER — INSULIN LISPRO 100/ML
7 VIAL (ML) SUBCUTANEOUS
Refills: 0 | Status: DISCONTINUED | OUTPATIENT
Start: 2021-12-29 | End: 2021-12-29

## 2021-12-29 RX ORDER — SODIUM CHLORIDE 9 MG/ML
500 INJECTION INTRAMUSCULAR; INTRAVENOUS; SUBCUTANEOUS ONCE
Refills: 0 | Status: COMPLETED | OUTPATIENT
Start: 2021-12-29 | End: 2021-12-29

## 2021-12-29 RX ORDER — SODIUM CHLORIDE 9 MG/ML
250 INJECTION INTRAMUSCULAR; INTRAVENOUS; SUBCUTANEOUS ONCE
Refills: 0 | Status: COMPLETED | OUTPATIENT
Start: 2021-12-29 | End: 2021-12-29

## 2021-12-29 RX ORDER — ACETAMINOPHEN 500 MG
650 TABLET ORAL ONCE
Refills: 0 | Status: COMPLETED | OUTPATIENT
Start: 2021-12-29 | End: 2021-12-29

## 2021-12-29 RX ORDER — POTASSIUM CHLORIDE 20 MEQ
40 PACKET (EA) ORAL ONCE
Refills: 0 | Status: COMPLETED | OUTPATIENT
Start: 2021-12-29 | End: 2021-12-29

## 2021-12-29 RX ORDER — SUCRALFATE 1 G
1 TABLET ORAL
Refills: 0 | Status: DISCONTINUED | OUTPATIENT
Start: 2021-12-29 | End: 2021-12-31

## 2021-12-29 RX ORDER — BENZOCAINE AND MENTHOL 5; 1 G/100ML; G/100ML
1 LIQUID ORAL
Refills: 0 | Status: DISCONTINUED | OUTPATIENT
Start: 2021-12-29 | End: 2021-12-30

## 2021-12-29 RX ORDER — INSULIN LISPRO 100/ML
VIAL (ML) SUBCUTANEOUS
Refills: 0 | Status: DISCONTINUED | OUTPATIENT
Start: 2021-12-29 | End: 2021-12-31

## 2021-12-29 RX ADMIN — Medication 650 MILLIGRAM(S): at 22:36

## 2021-12-29 RX ADMIN — Medication 650 MILLIGRAM(S): at 21:14

## 2021-12-29 RX ADMIN — BENZOCAINE AND MENTHOL 1 LOZENGE: 5; 1 LIQUID ORAL at 17:40

## 2021-12-29 RX ADMIN — SODIUM CHLORIDE 250 MILLILITER(S): 9 INJECTION INTRAMUSCULAR; INTRAVENOUS; SUBCUTANEOUS at 20:06

## 2021-12-29 RX ADMIN — Medication 40 MILLIEQUIVALENT(S): at 20:06

## 2021-12-29 RX ADMIN — LEVETIRACETAM 500 MILLIGRAM(S): 250 TABLET, FILM COATED ORAL at 05:28

## 2021-12-29 RX ADMIN — Medication 5 UNIT(S): at 17:26

## 2021-12-29 RX ADMIN — PANTOPRAZOLE SODIUM 40 MILLIGRAM(S): 20 TABLET, DELAYED RELEASE ORAL at 05:28

## 2021-12-29 RX ADMIN — Medication 2: at 08:36

## 2021-12-29 RX ADMIN — LACOSAMIDE 50 MILLIGRAM(S): 50 TABLET ORAL at 12:01

## 2021-12-29 RX ADMIN — POLYETHYLENE GLYCOL 3350 17 GRAM(S): 17 POWDER, FOR SOLUTION ORAL at 05:28

## 2021-12-29 RX ADMIN — SODIUM CHLORIDE 166.67 MILLILITER(S): 9 INJECTION INTRAMUSCULAR; INTRAVENOUS; SUBCUTANEOUS at 12:52

## 2021-12-29 RX ADMIN — HEPARIN SODIUM 5000 UNIT(S): 5000 INJECTION INTRAVENOUS; SUBCUTANEOUS at 05:29

## 2021-12-29 RX ADMIN — INSULIN GLARGINE 3 UNIT(S): 100 INJECTION, SOLUTION SUBCUTANEOUS at 23:01

## 2021-12-29 RX ADMIN — HEPARIN SODIUM 5000 UNIT(S): 5000 INJECTION INTRAVENOUS; SUBCUTANEOUS at 17:36

## 2021-12-29 RX ADMIN — Medication 5 UNIT(S): at 13:18

## 2021-12-29 RX ADMIN — PANTOPRAZOLE SODIUM 40 MILLIGRAM(S): 20 TABLET, DELAYED RELEASE ORAL at 17:36

## 2021-12-29 RX ADMIN — Medication 1 TABLET(S): at 12:01

## 2021-12-29 RX ADMIN — LEVETIRACETAM 500 MILLIGRAM(S): 250 TABLET, FILM COATED ORAL at 17:35

## 2021-12-29 RX ADMIN — Medication 1: at 16:26

## 2021-12-29 RX ADMIN — Medication 30 MILLILITER(S): at 12:01

## 2021-12-29 RX ADMIN — Medication 1 GRAM(S): at 12:01

## 2021-12-29 RX ADMIN — LACTULOSE 20 GRAM(S): 10 SOLUTION ORAL at 12:04

## 2021-12-29 RX ADMIN — Medication 4: at 13:17

## 2021-12-29 RX ADMIN — SODIUM CHLORIDE 100 MILLILITER(S): 9 INJECTION, SOLUTION INTRAVENOUS at 00:01

## 2021-12-29 RX ADMIN — POLYETHYLENE GLYCOL 3350 17 GRAM(S): 17 POWDER, FOR SOLUTION ORAL at 17:35

## 2021-12-29 RX ADMIN — ATORVASTATIN CALCIUM 40 MILLIGRAM(S): 80 TABLET, FILM COATED ORAL at 21:03

## 2021-12-29 RX ADMIN — LACOSAMIDE 50 MILLIGRAM(S): 50 TABLET ORAL at 21:03

## 2021-12-29 RX ADMIN — Medication 5 UNIT(S): at 08:36

## 2021-12-29 RX ADMIN — Medication 1 GRAM(S): at 17:36

## 2021-12-29 NOTE — PROGRESS NOTE ADULT - ASSESSMENT
66F c hx HTN, DM1, epilepsy on keppra/vimpat, vitilogo, chronic palpitations, pw 6 days constipation and abdominal pain with work up revealing gallstones and choledocholithiasis with 4 mm stone at the level of the pancreas.    #choledocholithiasis with 4 mm stone at the level of the pancreas - s/p ERCP with removal of stone.   #Constipation  # DM1 - with mild DKA overnight  # Epilepsy    Recommendations:  -Advance diet as tolerated  - Miralax BID for constipation  - Surgery consult for cholecystectomy  - DKA management per primary team    Thank you for involving us in the care of this patient. Please reach out if any further questions.    Enrique Barajas, PGY-5  GI Fellow    Available on [employer] Teams  Pager [phone] ([facility]) or [phone] ([facility])  After 5PM/Weekends, please contact the on-call GI fellow: [phone]  Available through [employer] Teams     66F c hx HTN, DM1, epilepsy on keppra/vimpat, vitilogo, chronic palpitations, pw 6 days constipation and abdominal pain with work up revealing gallstones and choledocholithiasis with 4 mm stone at the level of the pancreas.    #choledocholithiasis with 4 mm stone at the level of the pancreas - s/p ERCP with removal of stone.   #Constipation  # DM1 - with mild DKA overnight  # Epilepsy    Recommendations:  - Advance diet as tolerated  - Daily CMP, CBC, coags  - Miralax BID for constipation  - Surgery consult for cholecystectomy  - DKA management per primary team    Thank you for involving us in the care of this patient. Please reach out if any further questions.    Enrique Barajas, PGY-5  GI Fellow    Available on [employer] Teams  Pager [phone] ([facility]) or [phone] ([facility])  After 5PM/Weekends, please contact the on-call GI fellow: [phone]  Available through [employer] Teams

## 2021-12-29 NOTE — CONSULT NOTE ADULT - ATTENDING COMMENTS
ATTENDING ATTESTATION  I have seen and examined this patient with the resident housestaftf. I have reviewed all labs, imaging and reports. I have participated in formulating the plan, and have read and agree with the history, ROS, exam, assessment and plan as stated above.     Dx: cholecystitis with choledocholithiasis  Now s/p ERCP.   Patient had at least 1 week of symptoms of cholecystitis prior to admission, portending a very difficult cholecystectomy which would be less difficult with lower chance of conversion to open with a cool-down period and interval lap west. As she has a sphincterotomy, decreased chance of recurrenct symptomatic choledocholithiasis in the meantime.   Discussed surgery, and patient is not certain that she would even want surgery. Needs to discuss further with her family.   I recommend outpatient surgical follow up should she want surgery. Continued counseling that she is at continued risk for choledocholithiasis without eventual surgery.     Total time spent in the care of this patient today (excluding critical care & procedures): 55 min                 Over 50% of the total time was spent on counseling and coordination of care.     Lashay Winston M.D., M.S.  Dept of Trauma, Emergency General Surgery and Critical Care
Pt. seen, examined and d/w fellow. Agree with above note. Pt. is a 66F c hx HTN, DM1, epilepsy on keppra/vimpat, vitilogo, chronic palpitations, pw 6 days constipation and abdominal pain with work up revealing gallstones and choledocholithiasis with 4 mm stone at the level of the pancreas. Not cholangitic or symptomatic currently. Needs non urgent ERCP. We will schedule for next week.      Gabo Kay MD  [facility]
Type 1 DM, agree with suggestion of increased basal insulin

## 2021-12-29 NOTE — PROGRESS NOTE ADULT - ATTENDING COMMENTS
As above  Choledocholithiasis s/p ERCP with stone removal  Mild post ERCP pain  Needs surgical consult for CCY    Thank you for this interesting consult.  Please call the advanced GI service with any questions or concerns.

## 2021-12-29 NOTE — PROGRESS NOTE ADULT - ASSESSMENT
66F w/h/o uncontrolled T1DM on basal/bolus insulin. No known DM complications. Has not seen ophthalmologist in the last year. Also h/o HTN, epilepsy on keppra/vimpat, vitiligo, chronic palpitations. Here with 6 days of  constipation, lightheadedness, burning epigastric pain. Appears to be due to acute choledocholithiasis. Endocrine consulted for T1DM management. NPO yesterday for ERCP with overnight mild DKA after having regular clear liquid diet and not getting any premeal insulin. Spoke to primary team about the need to make sure staff administers insulin as ordered when pt eats regardless of time. Alos instructed pt to request premeal insulin whenever she eats. She verbalizes understanding. Will need to evaluate BG levels once pt is eating more consistently before able to make further insulin adjustments.  BG goal 100 to 180s.     Has old Freestyle sho 14 > updated to Freestyle 2. Set up low alarm to 80 and high alarm to 250 so pt can do POC testing if device alarms and prevent/treat hypo/hyperglycemia. Pt has CGM but will palce it on at time of discharge.

## 2021-12-29 NOTE — CHART NOTE - NSCHARTNOTEFT_GEN_A_CORE
RD placed pending verification to change pt diet to Clear Liquid/Consistent Carbohydrate/Vegan as per nurse request.

## 2021-12-29 NOTE — PROVIDER CONTACT NOTE (OTHER) - ACTION/TREATMENT ORDERED:
dextrose 50% 12.5 grams  IVP X 1 dose
labs BMP, Beta Hydroxy-Butyrate, VBG STAT and insulin as ordered
Per provider order, Give patient oral glucose supplementation (apple juice, crackers, etc) and recheck sugar in 15-20 minutes. Stat BMP. Give lantus 3units at bedtime and recheck BG at 0300.

## 2021-12-29 NOTE — CHART NOTE - NSCHARTNOTEFT_GEN_A_CORE
MEDICINE NP    HARPER DEXTER  66y Female    Patient is a 66y old  Female who presents with a chief complaint of abd pain, constipation, lightheadedness (29 Dec 2021 21:12)       > Event Summary:  Notified by RN, Patient with fingerstick glucose level of 46 and repeat of 39.         -Vital Signs Last 24 Hrs  T(C): 36.7 (29 Dec 2021 19:50), Max: 36.7 (29 Dec 2021 19:50)  T(F): 98 (29 Dec 2021 19:50), Max: 98 (29 Dec 2021 19:50)  HR: 86 (29 Dec 2021 19:50) (72 - 86)  BP: 106/67 (29 Dec 2021 19:50) (106/67 - 119/84)  BP(mean): --  RR: 18 (29 Dec 2021 19:50) (18 - 18)  SpO2: 99% (29 Dec 2021 19:50) (98% - 99%)    > Physical Assessment:  General: Awake, No acute distress.   Neurology: A&Ox3, nonfocal  CV: +S1S2, RRR.  No peripheral edema  Respiratory: Even, unlabored.  CTA B/L.    Abdomen:  +BS.  Soft, NT, ND.  No palpable mass  MSK: CARSON x4.   Skin: Warm and Dry         > Assessment & Plan:  - HPI:     -Plan:             Suad COLLINS #  Department of Medicine MEDICINE NP    HARPER DEXTER  66y Female    Patient is a 66y old  Female who presents with a chief complaint of abd pain, constipation, lightheadedness (29 Dec 2021 21:12)       > Event Summary:  Notified by RN, Patient with fingerstick glucose level of 46 and repeat of 39.         -Vital Signs Last 24 Hrs  T(C): 36.7 (29 Dec 2021 19:50), Max: 36.7 (29 Dec 2021 19:50)  T(F): 98 (29 Dec 2021 19:50), Max: 98 (29 Dec 2021 19:50)  HR: 86 (29 Dec 2021 19:50) (72 - 86)  BP: 106/67 (29 Dec 2021 19:50) (106/67 - 119/84)  BP(mean): --  RR: 18 (29 Dec 2021 19:50) (18 - 18)  SpO2: 99% (29 Dec 2021 19:50) (98% - 99%)    > Physical Assessment:  General: Awake, No acute distress.   Neurology: A&Ox3, nonfocal  CV: +S1S2, RRR.  No peripheral edema  Respiratory: Even, unlabored.  CTA B/L.    Abdomen:  +BS.  Soft, NT, ND.  No palpable mass  MSK: CARSON x4.   Skin: Warm and Dry         > Assessment & Plan:  - HPI: 66F c hx HTN, DM1, epilepsy on keppra/vimpat, vitilogo, chronic palpitations, pw constipation, poss gallstone pancreatitis, and hyperglycemia. S/p ERCP patient had a late dinner and didn't receive premeal insulin doses with BG >500s at bedtime with mild DKA which was corrected with insulin administration. Endocrine is following.  Patient is now hypoglycemic.     -Plan:   -Spoke with Endocrinologist, agreed to following  -Give Lantus dose tonight  -Check blood glucose at 3am  -Separate sliding scale for bedtime and meals  -Monitor closely  -Endorse to team in AM    Suad COLLINS #  Dept of Medicine        Suad COLLINS #  Department of Medicine

## 2021-12-29 NOTE — CHART NOTE - NSCHARTNOTEFT_GEN_A_CORE
Notified by RN, pt with bedtime F.S. 564 and 544 on repeat. Pt was NPO now on clear liquid diet s/p ERCP with h/o DM Type 1.  Pt is a 66 y.o. F with h/o HTN, DM1, epilepsy on keppra/vimpat, vitilogo, chronic palpitations, presents with 6 days constipation, lightheadedness, burning epigastric pain. Pt s/p MRCP 12/27 and ERCP 12/28 for + choledocholithiasis placed on clear liquid diet. Endocrine following, pt noted with "erratic" fingersticks. Pt on pre-meals/ sliding scale and Lantus.       POCT Blood Glucose (12.28.21 @ 17:27)   POCT Blood Glucose.: 147 mg/dL POCT Blood Glucose (12.28.21 @ 21:18)   POCT Blood Glucose.: 544 mg/dL POCT Blood Glucose (12.28.21 @ 21:20)   POCT Blood Glucose.: 564 mg/dL POCT Blood Glucose (12.28.21 @ 21:20)   POCT Blood Glucose.: 564 mg/dL POCT Blood Glucose (12.29.21 @ 02:00)   POCT Blood Glucose.: 258 mg/dL Blood Gas Venous - Lactate (12.28.21 @ 21:59)   Blood Gas Venous - Lactate: 2.2: Blood Gas Venous - Glucose (12.28.21 @ 21:59)     Blood Gas Venous - Glucose: >660 mg/dL     Basic Metabolic Panel - STAT (12.28.21 @ 21:57)   Sodium, Serum: 125 mmol/L   Potassium, Serum: 5.0 mmol/L   Chloride, Serum: 86 mmol/L   Carbon Dioxide, Serum: 21 mmol/L   Anion Gap, Serum: 18 mmol/L   Blood Urea Nitrogen, Serum: 14 mg/dL   Creatinine, Serum: 0.91 mg/dL   Glucose, Serum: 600: TEST REPEATED. mg/dL   Calcium, Total Serum: 9.0 mg/dL       12-28    125<L>  |  86<L>  |  14  ----------------------------<  600<HH>  5.0   |  21<L>  |  0.91    Ca    9.0      28 Dec 2021 21:57    TPro  6.4  /  Alb  3.6  /  TBili  0.3  /  DBili  x   /  AST  26  /  ALT  20  /  AlkPhos  72  12-28        Assessment & Plan:  66 y.o. F with h/o HTN, DM1, epilepsy on keppra/vimpat, vitilogo, chronic palpitations, presents with 6 days constipation, lightheadedness, burning epigastric pain. Pt s/p MRCP 12/27 and ERCP 12/28 for + choledocholithiasis placed on clear liquid diet. Endocrine following, pt noted with "erratic" fingersticks. Pt on pre-meals/ sliding scale and Lantus.     -VBG w/ Lactate 2.2  - Beta Hydroxy Butyrate 0.9  - serum BMP  glucose 660, w/ Anion Gap and serum CO2 of 21  Pt had received 6 units Admelog by night sliding scale, and 3 units of Lantus at bedtime scale.  Notified Night covering Endocrinologist Dr. Sun Kothari of results, indicated pt did not receive her dinner pre- meal  upon reviewing her insulin coverage, ME informs pt is in mild DKA, follow up 2 am finger stick as scheduled and cover with bedtime scale if > 350.  2 am F.S. 258  Follow up with Endocrinology and  Attending in AM.    Leilani Jurado, CHERYL   [id number]

## 2021-12-29 NOTE — CONSULT NOTE ADULT - ASSESSMENT
67yo F with Hx HTN, DM1, and epilepsy (well-controlled on Keppra/Vimpat) who presented with 6 days of epigastric pain, found to have choledocholithiasis without evidence of acute cholecystitis. Patient is s/p ERCP on 12/28 with sphincterotomy and balloon extraction of sludge and a large black stone. Surgery consulted for cholecystectomy.     PLAN:   - No acute surgical intervention at this time   - Cholecystectomy planning, likely as OP, given patient does not have gallstone pancreatitis and had 6 days of pain/inflammation prior to presentation  - Patient would like to discuss surgery with her family tomorrow in the morning prior to making a decision  - Will discuss OR planning with team and f/u with patient's decision tomorrow    Discussed with Dr. Catrachito Chen, PGY-2  ACS | Trauma Surgery  #[id number]

## 2021-12-29 NOTE — PROVIDER CONTACT NOTE (OTHER) - BACKGROUND
admitted with choledocholithiasis, hx: TYPE 1 DM
admitted with abdominal pain, cholelithiasis
Patient admitted with cholelithiasis and history of type 1 diabetes mellitus.

## 2021-12-29 NOTE — CONSULT NOTE ADULT - SUBJECTIVE AND OBJECTIVE BOX
GENERAL SURGERY CONSULT NOTE  Consulting surgical team:   Consulting attending:    HPI:  HPI:  66F c hx HTN, DM1, epilepsy on keppra/vimpat, vitilogo, chronic palpitations, pw 6 days constipation, lightheadedness, burning epigastric pain.    Pt reports her last seizure was about 2-3 months ago. Pt states she did not take her seizure medications this morning because of current symptoms. Pt's last colonscopy was 11 yrs ago. Never had EGD before.  Pt reports 6 days of no BM, but good appetite without nausea or vomiting. Pt reports burning epigastric pain. Also reports lightheadedness for 6 days. Pt states her blood sugars have been in the 400s. And reports urinary frequency without dysuria or fevers. Pt states she has chronic palpitations and chest pain that are not worse than usual. Pt took tylenol for her abd pain with relief. Pt did not take anything for her constipation.   (24 Dec 2021 22:19)      PAST MEDICAL HISTORY:  HLD (hyperlipidemia)    DM2 (diabetes mellitus, type 2)    DM (diabetes mellitus)        PAST SURGICAL HISTORY:  No significant past surgical history        MEDICATIONS:  atorvastatin 40 milliGRAM(s) Oral at bedtime  benzocaine 15 mG/menthol 3.6 mG (Sugar-Free) Lozenge 1 Lozenge Oral two times a day  dextrose 40% Gel 15 Gram(s) Oral once  dextrose 50% Injectable 25 Gram(s) IV Push once  dextrose 50% Injectable 12.5 Gram(s) IV Push once  dextrose 50% Injectable 25 Gram(s) IV Push once  glucagon  Injectable 1 milliGRAM(s) IntraMuscular once  heparin   Injectable 5000 Unit(s) SubCutaneous every 12 hours  influenza  Vaccine (HIGH DOSE) 0.7 milliLiter(s) IntraMuscular once  insulin glargine Injectable (LANTUS) 3 Unit(s) SubCutaneous at bedtime  insulin lispro (ADMELOG) corrective regimen sliding scale   SubCutaneous Before meals and at bedtime  insulin lispro (ADMELOG) corrective regimen sliding scale   SubCutaneous at bedtime  insulin lispro Injectable (ADMELOG) 5 Unit(s) SubCutaneous three times a day before meals  lacosamide 50 milliGRAM(s) Oral two times a day  levETIRAcetam 500 milliGRAM(s) Oral two times a day  multivitamin 1 Tablet(s) Oral daily  pantoprazole    Tablet 40 milliGRAM(s) Oral two times a day  polyethylene glycol 3350 17 Gram(s) Oral two times a day  senna 2 Tablet(s) Oral at bedtime  sucralfate 1 Gram(s) Oral two times a day      ALLERGIES:  penicillin (Headache)      VITALS & I/Os:  Vital Signs Last 24 Hrs  T(C): 36.7 (29 Dec 2021 19:50), Max: 36.7 (29 Dec 2021 19:50)  T(F): 98 (29 Dec 2021 19:50), Max: 98 (29 Dec 2021 19:50)  HR: 86 (29 Dec 2021 19:50) (72 - 86)  BP: 106/67 (29 Dec 2021 19:50) (106/67 - 119/84)  RR: 18 (29 Dec 2021 19:50) (18 - 18)  SpO2: 99% (29 Dec 2021 19:50) (98% - 99%)    I&O's Summary    28 Dec 2021 07:01  -  29 Dec 2021 07:00  --------------------------------------------------------  IN: 1150 mL / OUT: 0 mL / NET: 1150 mL    29 Dec 2021 07:01  -  29 Dec 2021 21:15  --------------------------------------------------------  IN: 480 mL / OUT: 0 mL / NET: 480 mL        PHYSICAL EXAM:  GEN: resting comfortably in bed, in NAD  HEENT: normocephalic, non-tender to palpation, no abrasions visible, no step-offs palpated  NECK: no JVD, non-tender to palpation at posterior midline, no pain with flexion, extension, and bilateral neck rotation  CHEST: non-tender to palpation across clavicles and b/l anterior ribs  BACK: non-tender to palpation along cervical, thoracic, lumbar spine midline and b/l posterior ribs; no palpable step-offs or hematomas  ABD: soft, non-distended, non-tender   RECTUM: good rectal tone  LUE: non-tender to palpation across upper and lower arm, 5/5  strength, fingers warm, well-perfused, full ROM in shoulder, elbow, wrist, and fingers, palpable radial + ulnar pulses  RUE: non-tender to palpation across upper and lower arm, 5/5  strength, fingers warm, well-perfused, full ROM in shoulder, elbow, wrist, and fingers, palpable radial + ulnar pulses  LLE: non-tender to palpation across upper and lower leg; full ROM in hip, knee, ankle, and toes, 5/5 dorsiflexion + plantarflexion, palpable DP + PT pulses; warm, well-perfused  RLE: non-tender to palpation across upper and lower leg; full ROM in hip, knee, ankle, and toes, 5/5 dorsiflexion + plantarflexion, palpable DP + PT pulses; warm, well-perfused  NEURO: AAOx4, no focal neuro deficits; CN II-IX intact    LABS:                        12.3   5.43  )-----------( 159      ( 29 Dec 2021 06:39 )             35.5     12-29    129<L>  |  93<L>  |  10  ----------------------------<  109<H>  3.6   |  22  |  0.66    Ca    8.9      29 Dec 2021 19:22    TPro  6.4  /  Alb  3.6  /  TBili  0.3  /  DBili  x   /  AST  26  /  ALT  20  /  AlkPhos  72  12-28    Lactate:  12-28 @ 21:59  2.2    PT/INR - ( 28 Dec 2021 03:17 )   PT: 12.0 sec;   INR: 1.00 ratio         PTT - ( 28 Dec 2021 03:17 )  PTT:27.8 sec              IMAGING:   GENERAL SURGERY CONSULT NOTE  Consulting surgical team: Acute Care Surgery  Consulting attending: Dr. Winston    HPI:  65yo F with Hx HTN, DM1, and epilepsy (well-controlled on Keppra/Vimpat) who presented with 6 days of epigastric pain, found to have choledocholithiasis without evidence of acute cholecystitis. Patient is s/p ERCP on 12/28 with sphincterotomy and balloon extraction of sludge and a large black stone. Surgery consulted for cholecystectomy.     Patient states that she has never had abdominal surgery before. She has been feeling well since the ERCP and denies post-prandial pain. She states that she would like to discuss surgery with her family prior to making a decision.       PAST MEDICAL HISTORY:  HLD (hyperlipidemia)  DM (diabetes mellitus)    PAST SURGICAL HISTORY:  No significant past surgical history    MEDICATIONS:  atorvastatin 40 milliGRAM(s) Oral at bedtime  benzocaine 15 mG/menthol 3.6 mG (Sugar-Free) Lozenge 1 Lozenge Oral two times a day  dextrose 40% Gel 15 Gram(s) Oral once  dextrose 50% Injectable 25 Gram(s) IV Push once  dextrose 50% Injectable 12.5 Gram(s) IV Push once  dextrose 50% Injectable 25 Gram(s) IV Push once  glucagon  Injectable 1 milliGRAM(s) IntraMuscular once  heparin   Injectable 5000 Unit(s) SubCutaneous every 12 hours  influenza  Vaccine (HIGH DOSE) 0.7 milliLiter(s) IntraMuscular once  insulin glargine Injectable (LANTUS) 3 Unit(s) SubCutaneous at bedtime  insulin lispro (ADMELOG) corrective regimen sliding scale   SubCutaneous Before meals and at bedtime  insulin lispro (ADMELOG) corrective regimen sliding scale   SubCutaneous at bedtime  insulin lispro Injectable (ADMELOG) 5 Unit(s) SubCutaneous three times a day before meals  lacosamide 50 milliGRAM(s) Oral two times a day  levETIRAcetam 500 milliGRAM(s) Oral two times a day  multivitamin 1 Tablet(s) Oral daily  pantoprazole    Tablet 40 milliGRAM(s) Oral two times a day  polyethylene glycol 3350 17 Gram(s) Oral two times a day  senna 2 Tablet(s) Oral at bedtime  sucralfate 1 Gram(s) Oral two times a day    ALLERGIES:  penicillin (Headache)    VITALS & I/Os:  Vital Signs Last 24 Hrs  T(C): 36.7 (29 Dec 2021 19:50), Max: 36.7 (29 Dec 2021 19:50)  T(F): 98 (29 Dec 2021 19:50), Max: 98 (29 Dec 2021 19:50)  HR: 86 (29 Dec 2021 19:50) (72 - 86)  BP: 106/67 (29 Dec 2021 19:50) (106/67 - 119/84)  RR: 18 (29 Dec 2021 19:50) (18 - 18)  SpO2: 99% (29 Dec 2021 19:50) (98% - 99%)    28 Dec 2021 07:01  -  29 Dec 2021 07:00  --------------------------------------------------------  IN: 1150 mL / OUT: 0 mL / NET: 1150 mL    29 Dec 2021 07:01  -  29 Dec 2021 21:15  --------------------------------------------------------  IN: 480 mL / OUT: 0 mL / NET: 480 mL      PHYSICAL EXAM:   GEN: resting in bed comfortably in NAD  NEURO: awake, alert  CV: warm, well-perfused  RESP: no increased WOB  ABD: soft, non-distended, tender to palpation in epigastric region and RUQ only without rebound tenderness or guarding  EXTR: no gross deformities; spontaneous movement in b/l U/L extrem       LABS:             12.3   5.43  )-----------( 159      ( 29 Dec 2021 06:39 )             35.5     129<L>  |  93<L>  |  10  ----------------------------<  109<H>  3.6   |  22  |  0.66    Ca    8.9      29 Dec 2021 19:22    TPro  6.4  /  Alb  3.6  /  TBili  0.3  /  DBili  x   /  AST  26  /  ALT  20  /  AlkPhos  72  12-28    Lactate:  12-28 @ 21:59  2.2    PT/INR - ( 28 Dec 2021 03:17 )   PT: 12.0 sec;   INR: 1.00 ratio    PTT - ( 28 Dec 2021 03:17 )  PTT:27.8 sec      IMAGING:  < from: CT Abdomen and Pelvis w/ IV Cont (12.24.21 @ 17:08) >  IMPRESSION:  Choledocholithiasis with 0.4 cm stone at the level of the pancreatic head   within the main bile duct with resulting mild intrahepatic biliary ductal   dilationLIVER: Within normal limits.  BILE DUCTS: 0.4 cm stone within the common bile duct at the level of the   pancreatic head with the duct measuring approximately 1.0 cm in diameter.  GALLBLADDER: Cholelithiasis.  SPLEEN: Within normal limits.  PANCREAS: Within normal limits.    < end of copied text >    --    < from: US Abdomen Upper Quadrant Right (12.24.21 @ 19:22) >  FINDINGS:    Liver: Within normal limits.  Bile ducts: No evidence for intrahepatic biliary ductal dilatation.   Common bile duct measures 6-7 mm. Note is made of a 6 mm calculus within   the extrahepatic CBD.  Gallbladder: Multiple gallstones identified. No definite gallbladder wall   thickening identified. No pericholecystic fluid.  Pancreas: Visualized portions are within normal limits.  Right kidney: 9.9 cm. No hydronephrosis.  Ascites: None.  IVC and aorta: Visualized portions are within normal limits.    IMPRESSION:  Common bile duct measures 6-7 mm. Note is made of a 6 mm calculus within   the extrahepatic CBD. Multiple gallstones. No definite gallbladder wall   thickening.    < end of copied text >    --    < from: MR MRCP w/wo IV Cont (12.27.21 @ 17:21) >  FINDINGS:  LOWER CHEST: Within normal limits.    LIVER: Normal size and contour. No liver mass.  BILE DUCTS: No intrahepatic biliary ductal dilatation. The common bile   duct measures up to 9 mm. A 5 mm calculus is seen within the distal   common bile duct, advanced slightly compared to its position on prior CT.  GALLBLADDER: Multiple small gallstones. Fundal adenomyomatosis. No   pericholecystic inflammation.  SPLEEN: Within normal limits.  PANCREAS: Within normal limits.  ADRENALS: Within normal limits.  KIDNEYS/URETERS: Within normal limits.    VISUALIZED PORTIONS:  BOWEL: Within normal limits.  PERITONEUM: No ascites.  VESSELS: Within normal limits.  RETROPERITONEUM/LYMPH NODES: No lymphadenopathy.  ABDOMINAL WALL: Within normal limits.  BONES: Within normal limits.    IMPRESSION:  Cholelithiasis and choledocholithiasis.    < end of copied text >

## 2021-12-29 NOTE — PROVIDER CONTACT NOTE (OTHER) - RECOMMENDATIONS
Give patient oral glucose supplementation (apple juice, crackers, etc) and recheck sugar in 15-20 minutes.
labs and insulin?
dextrose?

## 2021-12-29 NOTE — PROGRESS NOTE ADULT - PROBLEM SELECTOR PLAN 2
Mixed.  hypertonic hyponatremia from hyperglycemia   as well as hypovolemia  s/p lr  favor 500 cc more of ns  urine studies ordered  goal na 136 to 138 tonight  dm mgmt with endo. recs appreciated.

## 2021-12-29 NOTE — CONSULT NOTE ADULT - REASON FOR ADMISSION
abd pain, constipation, lightheadedness

## 2021-12-30 LAB
ANION GAP SERPL CALC-SCNC: 10 MMOL/L — SIGNIFICANT CHANGE UP (ref 5–17)
BUN SERPL-MCNC: 6 MG/DL — LOW (ref 7–23)
CALCIUM SERPL-MCNC: 8.3 MG/DL — LOW (ref 8.4–10.5)
CHLORIDE SERPL-SCNC: 94 MMOL/L — LOW (ref 96–108)
CO2 SERPL-SCNC: 23 MMOL/L — SIGNIFICANT CHANGE UP (ref 22–31)
CREAT SERPL-MCNC: 0.57 MG/DL — SIGNIFICANT CHANGE UP (ref 0.5–1.3)
CULTURE RESULTS: SIGNIFICANT CHANGE UP
CULTURE RESULTS: SIGNIFICANT CHANGE UP
GLUCOSE BLDC GLUCOMTR-MCNC: 150 MG/DL — HIGH (ref 70–99)
GLUCOSE BLDC GLUCOMTR-MCNC: 220 MG/DL — HIGH (ref 70–99)
GLUCOSE BLDC GLUCOMTR-MCNC: 230 MG/DL — HIGH (ref 70–99)
GLUCOSE BLDC GLUCOMTR-MCNC: 254 MG/DL — HIGH (ref 70–99)
GLUCOSE BLDC GLUCOMTR-MCNC: 397 MG/DL — HIGH (ref 70–99)
GLUCOSE SERPL-MCNC: 228 MG/DL — HIGH (ref 70–99)
POTASSIUM SERPL-MCNC: 4.5 MMOL/L — SIGNIFICANT CHANGE UP (ref 3.5–5.3)
POTASSIUM SERPL-SCNC: 4.5 MMOL/L — SIGNIFICANT CHANGE UP (ref 3.5–5.3)
SODIUM SERPL-SCNC: 127 MMOL/L — LOW (ref 135–145)
SPECIMEN SOURCE: SIGNIFICANT CHANGE UP
SPECIMEN SOURCE: SIGNIFICANT CHANGE UP

## 2021-12-30 PROCEDURE — 99232 SBSQ HOSP IP/OBS MODERATE 35: CPT

## 2021-12-30 PROCEDURE — 99221 1ST HOSP IP/OBS SF/LOW 40: CPT

## 2021-12-30 RX ORDER — SODIUM CHLORIDE 9 MG/ML
1 INJECTION INTRAMUSCULAR; INTRAVENOUS; SUBCUTANEOUS THREE TIMES A DAY
Refills: 0 | Status: DISCONTINUED | OUTPATIENT
Start: 2021-12-30 | End: 2021-12-30

## 2021-12-30 RX ORDER — SODIUM CHLORIDE 9 MG/ML
1000 INJECTION INTRAMUSCULAR; INTRAVENOUS; SUBCUTANEOUS
Refills: 0 | Status: DISCONTINUED | OUTPATIENT
Start: 2021-12-30 | End: 2021-12-30

## 2021-12-30 RX ADMIN — SODIUM CHLORIDE 1 GRAM(S): 9 INJECTION INTRAMUSCULAR; INTRAVENOUS; SUBCUTANEOUS at 10:13

## 2021-12-30 RX ADMIN — HEPARIN SODIUM 5000 UNIT(S): 5000 INJECTION INTRAVENOUS; SUBCUTANEOUS at 17:10

## 2021-12-30 RX ADMIN — Medication 1 TABLET(S): at 10:19

## 2021-12-30 RX ADMIN — ATORVASTATIN CALCIUM 40 MILLIGRAM(S): 80 TABLET, FILM COATED ORAL at 21:33

## 2021-12-30 RX ADMIN — Medication 1 GRAM(S): at 17:09

## 2021-12-30 RX ADMIN — HEPARIN SODIUM 5000 UNIT(S): 5000 INJECTION INTRAVENOUS; SUBCUTANEOUS at 05:04

## 2021-12-30 RX ADMIN — PANTOPRAZOLE SODIUM 40 MILLIGRAM(S): 20 TABLET, DELAYED RELEASE ORAL at 05:04

## 2021-12-30 RX ADMIN — PANTOPRAZOLE SODIUM 40 MILLIGRAM(S): 20 TABLET, DELAYED RELEASE ORAL at 17:13

## 2021-12-30 RX ADMIN — Medication 5 UNIT(S): at 12:55

## 2021-12-30 RX ADMIN — Medication 2: at 12:55

## 2021-12-30 RX ADMIN — INSULIN GLARGINE 3 UNIT(S): 100 INJECTION, SOLUTION SUBCUTANEOUS at 22:05

## 2021-12-30 RX ADMIN — Medication 5: at 18:01

## 2021-12-30 RX ADMIN — LEVETIRACETAM 500 MILLIGRAM(S): 250 TABLET, FILM COATED ORAL at 17:09

## 2021-12-30 RX ADMIN — LACOSAMIDE 50 MILLIGRAM(S): 50 TABLET ORAL at 10:17

## 2021-12-30 RX ADMIN — LACOSAMIDE 50 MILLIGRAM(S): 50 TABLET ORAL at 21:33

## 2021-12-30 RX ADMIN — Medication 5 UNIT(S): at 08:39

## 2021-12-30 RX ADMIN — POLYETHYLENE GLYCOL 3350 17 GRAM(S): 17 POWDER, FOR SOLUTION ORAL at 17:12

## 2021-12-30 RX ADMIN — Medication 5 UNIT(S): at 18:02

## 2021-12-30 RX ADMIN — Medication 1 GRAM(S): at 05:04

## 2021-12-30 RX ADMIN — LEVETIRACETAM 500 MILLIGRAM(S): 250 TABLET, FILM COATED ORAL at 05:04

## 2021-12-30 NOTE — PROGRESS NOTE ADULT - PROBLEM SELECTOR PROBLEM 2
HTN (hypertension)
HTN (hypertension)
Hyponatremia
HTN (hypertension)
Constipation
HTN (hypertension)
Hyponatremia
Constipation
HTN (hypertension)
Constipation
Constipation

## 2021-12-30 NOTE — PROGRESS NOTE ADULT - ASSESSMENT
66F w/h/o uncontrolled T1DM on basal/bolus insulin. No known DM complications. Has not seen ophthalmologist in the last year. Also h/o HTN, epilepsy on keppra/vimpat, vitiligo, chronic palpitations. Here with 6 days of  constipation, lightheadedness, burning epigastric pain. Appears to be due to acute choledocholithiasis. Endocrine consulted for T1DM management. Hypoglycemic yesterday after receiving mealtime insulin at dinner w/low carb/PO consumption. Diet advanced today, tolerating. Will assess requirements while on full diet. BG goal (100-180mg/dl).

## 2021-12-30 NOTE — PROGRESS NOTE ADULT - REASON FOR ADMISSION
abd pain, constipation, lightheadedness

## 2021-12-30 NOTE — PROGRESS NOTE ADULT - PROVIDER SPECIALTY LIST ADULT
Endocrinology
Gastroenterology
Endocrinology
Gastroenterology
Surgery
Hospitalist
Hospitalist
Endocrinology
Hospitalist
Hospitalist
Internal Medicine
Hospitalist

## 2021-12-30 NOTE — PROGRESS NOTE ADULT - NUTRITIONAL ASSESSMENT
This patient has been assessed with a concern for Malnutrition and has been determined to have a diagnosis/diagnoses of Moderate protein-calorie malnutrition and Underweight (BMI < 19).    This patient is being managed with:   Diet Consistent Carbohydrate Renal/No Snacks-  1000mL Fluid Restriction ([id number])  Vegan {Accepts Vegetable Products Only}  Entered: Dec 30 2021  8:00AM    
This patient has been assessed with a concern for Malnutrition and has been determined to have a diagnosis/diagnoses of Moderate protein-calorie malnutrition and Underweight (BMI < 19).    This patient is being managed with:   Diet Consistent Carbohydrate Renal/No Snacks-  1000mL Fluid Restriction ([id number])  Vegan {Accepts Vegetable Products Only}  Entered: Dec 30 2021  8:00AM    
Diet, Consistent Carbohydrate Clear Liquid:   Vegan {Accepts Vegetable Products Only} (12-29-21 @ 10:30) [Active]    Needs RD consult for carb counting education
This patient has been assessed with a concern for Malnutrition and has been determined to have a diagnosis/diagnoses of Moderate protein-calorie malnutrition and Underweight (BMI < 19).    This patient is being managed with:   Diet NPO after Midnight-     NPO Start Date: 27-Dec-2021   NPO Start Time: 23:59  Except Medications  Entered: Dec 27 2021 10:40PM    Diet NPO after Midnight-     NPO Start Date: 27-Dec-2021   NPO Start Time: 23:59  Entered: Dec 27 2021  8:04PM    Diet DASH/TLC-  Sodium & Cholesterol Restricted  Consistent Carbohydrate {Evening Snack} (CSTCHOSN)  Entered: Dec 24 2021 10:43PM    
This patient has been assessed with a concern for Malnutrition and has been determined to have a diagnosis/diagnoses of Moderate protein-calorie malnutrition and Underweight (BMI < 19).    This patient is being managed with:   Diet NPO after Midnight-     NPO Start Date: 26-Dec-2021   NPO Start Time: 23:59  Entered: Dec 26 2021  6:46PM    Diet DASH/TLC-  Sodium & Cholesterol Restricted  Consistent Carbohydrate {Evening Snack} (CSTCHOSN)  Entered: Dec 24 2021 10:43PM    
Diet, NPO after Midnight:      NPO Start Date: 26-Dec-2021,   NPO Start Time: 23:59 (12-26-21 @ 18:46) [Active]  Diet, DASH/TLC:   Sodium & Cholesterol Restricted  Consistent Carbohydrate {Evening Snack} (CSTCHOSN) (12-24-21 @ 22:43) [Active]      Needs RD consult
This patient has been assessed with a concern for Malnutrition and has been determined to have a diagnosis/diagnoses of Moderate protein-calorie malnutrition and Underweight (BMI < 19).    This patient is being managed with:   Diet Consistent Carbohydrate Clear Liquid-  Vegan {Accepts Vegetable Products Only}  Entered: Dec 29 2021 10:29AM    
Diet, Consistent Carbohydrate Renal/No Snacks:   1000mL Fluid Restriction ([id number])  Vegan {Accepts Vegetable Products Only} (12-30-21 @ 08:00) [Active]
Diet, NPO after Midnight:      NPO Start Date: 26-Dec-2021,   NPO Start Time: 23:59 (12-26-21 @ 18:46) [Active]  Diet, DASH/TLC:   Sodium & Cholesterol Restricted  Consistent Carbohydrate {Evening Snack} (CSTCHOSN) (12-24-21 @ 22:43) [Active]    Needs RD consult for carb counting education

## 2021-12-30 NOTE — PROGRESS NOTE ADULT - PROBLEM SELECTOR PROBLEM 1
Choledocholithiasis
Uncontrolled type 1 diabetes mellitus with hyperglycemia, with long-term current use of insulin
Choledocholithiasis

## 2021-12-30 NOTE — PROGRESS NOTE ADULT - SUBJECTIVE AND OBJECTIVE BOX
INTERNAL MEDICINE PROGRESS NOTE    Dr. Louis Prather, DO  Hospitalist  Division of Hospital Medicine  [facility]  Available via Microsoft Teams      SUBJECTIVE:  No acute complaints.     REVIEW OF SYSTEMS   12 point review of systems negative except for above.     PAST MEDICAL & SURGICAL HISTORY:  HLD (hyperlipidemia)    DM (diabetes mellitus)  Type 1    No significant past surgical history        MEDICATIONS  (STANDING):  atorvastatin 40 milliGRAM(s) Oral at bedtime  dextrose 40% Gel 15 Gram(s) Oral once  dextrose 50% Injectable 25 Gram(s) IV Push once  dextrose 50% Injectable 12.5 Gram(s) IV Push once  dextrose 50% Injectable 25 Gram(s) IV Push once  glucagon  Injectable 1 milliGRAM(s) IntraMuscular once  heparin   Injectable 5000 Unit(s) SubCutaneous every 12 hours  influenza  Vaccine (HIGH DOSE) 0.7 milliLiter(s) IntraMuscular once  insulin glargine Injectable (LANTUS) 3 Unit(s) SubCutaneous at bedtime  insulin lispro (ADMELOG) corrective regimen sliding scale   SubCutaneous at bedtime  insulin lispro (ADMELOG) corrective regimen sliding scale   SubCutaneous three times a day before meals  insulin lispro Injectable (ADMELOG) 5 Unit(s) SubCutaneous three times a day before meals  lacosamide 50 milliGRAM(s) Oral two times a day  levETIRAcetam 500 milliGRAM(s) Oral two times a day  multivitamin 1 Tablet(s) Oral daily  pantoprazole    Tablet 40 milliGRAM(s) Oral two times a day  polyethylene glycol 3350 17 Gram(s) Oral two times a day  sodium chloride 1 Gram(s) Oral three times a day  sucralfate 1 Gram(s) Oral two times a day    MEDICATIONS  (PRN):      Allergies    penicillin (Headache)    Intolerances        T(C): 36.4 (12-30-21 @ 11:57), Max: 36.7 (12-29-21 @ 19:50)  T(F): 97.5 (12-30-21 @ 11:57), Max: 98 (12-29-21 @ 19:50)  HR: 81 (12-30-21 @ 11:57) (67 - 86)  BP: 115/65 (12-30-21 @ 11:57) (106/67 - 115/65)  ABP: --  ABP(mean): --  RR: 18 (12-30-21 @ 11:57) (18 - 18)  SpO2: 96% (12-30-21 @ 11:57) (96% - 99%)      CONSTITUTIONAL: No acute distress.   HEENT:  Conjunctiva clear B/L.  Moist oral mucosa.   Cardiovascular: RRR with no murmurs. No JVD noted. No lower extremity edema B/L. Extremities are warm and well perfused. Radial pulses 2+ B/L. Dorsalis pedis pulses 2+ B/L.    Respiratory: Lungs CTAB. No wrr. No accessory muscle use.   Gastrointestinal:  Soft, nontender. Non-distended. Non-rigid. No CVA tenderness B/L.  MSK:  No joint swelling. No joint erythema B/L. No midline spinal tenderness.  Neurologic:  Alert and awake. Moving all extremities. Following commands. Making eye contact.    Psych:  Normal affect. Normal Mood.     LABS                        12.3   5.43  )-----------( 159      ( 29 Dec 2021 06:39 )             35.5     12-30    127<L>  |  94<L>  |  6<L>  ----------------------------<  228<H>  4.5   |  23  |  0.57    Ca    8.3<L>      30 Dec 2021 06:46            ALL RECENT STUDIES REVIEWED INCLUDING REPORTS AVAILABLE     CARE DISCUSSED WITH ALL CONSULTANTS   
Interval Events:   - s/p ERCP (see report)  - This AM reports some mild RUQ abdominal pain - similar in characteristics to pain on presentation but much improved.     Allergies:  penicillin (Headache)      Hospital Medications:  atorvastatin 40 milliGRAM(s) Oral at bedtime  dextrose 40% Gel 15 Gram(s) Oral once  dextrose 50% Injectable 25 Gram(s) IV Push once  dextrose 50% Injectable 12.5 Gram(s) IV Push once  dextrose 50% Injectable 25 Gram(s) IV Push once  glucagon  Injectable 1 milliGRAM(s) IntraMuscular once  heparin   Injectable 5000 Unit(s) SubCutaneous every 12 hours  influenza  Vaccine (HIGH DOSE) 0.7 milliLiter(s) IntraMuscular once  insulin glargine Injectable (LANTUS) 3 Unit(s) SubCutaneous at bedtime  insulin lispro (ADMELOG) corrective regimen sliding scale   SubCutaneous Before meals and at bedtime  insulin lispro (ADMELOG) corrective regimen sliding scale   SubCutaneous at bedtime  insulin lispro Injectable (ADMELOG) 5 Unit(s) SubCutaneous three times a day before meals  lacosamide 50 milliGRAM(s) Oral two times a day  levETIRAcetam 500 milliGRAM(s) Oral two times a day  multivitamin 1 Tablet(s) Oral daily  pantoprazole    Tablet 40 milliGRAM(s) Oral two times a day  polyethylene glycol 3350 17 Gram(s) Oral two times a day  senna 2 Tablet(s) Oral at bedtime      PMHX/PSHX:  HLD (hyperlipidemia)    DM2 (diabetes mellitus, type 2)    DM (diabetes mellitus)    No significant past surgical history        Family history:  FH: type 1 diabetes (Grandparent)        ROS:   General:  No wt loss, fevers, chills, night sweats, fatigue,   Eyes:  Good vision, no reported pain  ENT:  No sore throat, pain, runny nose, dysphagia  CV:  No pain, palpitations, hypo/hypertension  Pulm:  No dyspnea, cough, tachypnea, wheezing  GI:  As per HPI  :  No pain, bleeding, incontinence, nocturia  Muscle:  No pain, weakness  Neuro:  No weakness, tingling, memory problems  Psych:  No fatigue, insomnia, mood problems, depression  Endocrine:  No polyuria, polydipsia, cold/heat intolerance  Heme:  No petechiae, ecchymosis, easy bruisability  Skin:  No rash, tattoos, scars, edema      PHYSICAL EXAM:   Vital Signs:  Vital Signs Last 24 Hrs  T(C): 36.3 (29 Dec 2021 04:23), Max: 36.5 (28 Dec 2021 11:43)  T(F): 97.4 (29 Dec 2021 04:23), Max: 97.7 (28 Dec 2021 11:43)  HR: 80 (29 Dec 2021 04:23) (63 - 82)  BP: 119/84 (29 Dec 2021 04:23) (116/73 - 155/72)  BP(mean): 95 (28 Dec 2021 13:40) (95 - 95)  RR: 18 (29 Dec 2021 04:23) (14 - 20)  SpO2: 99% (29 Dec 2021 04:23) (97% - 100%)  Daily Height in cm: 157.48 (28 Dec 2021 13:49)    Daily       12-28-21 @ 07:01  -  12-29-21 @ 07:00  --------------------------------------------------------  IN: 1150 mL / OUT: 0 mL / NET: 1150 mL        GENERAL:  NAD, Appears stated age  HEENT:  NC/AT,  conjunctivae clear and pink, sclera -anicteric  CHEST:  CTA B/L, Normal effort  HEART:  RRR S1/S2,  ABDOMEN:  Soft, mild discomfort in epigastric region  EXTREMITIES:  No cyanosis or Edema  SKIN:  Warm & Dry. No rash or erythema  NEURO:  Alert, oriented, no focal deficit    LABS:                        12.3   5.43  )-----------( 159      ( 29 Dec 2021 06:39 )             35.5     Mean Cell Volume: 82.9 fl (12-29-21 @ 06:39)    12-29    128<L>  |  95<L>  |  11  ----------------------------<  235<H>  4.5   |  21<L>  |  0.67    Ca    9.2      29 Dec 2021 06:39    TPro  6.4  /  Alb  3.6  /  TBili  0.3  /  DBili  x   /  AST  26  /  ALT  20  /  AlkPhos  72  12-28    LIVER FUNCTIONS - ( 28 Dec 2021 03:17 )  Alb: 3.6 g/dL / Pro: 6.4 g/dL / ALK PHOS: 72 U/L / ALT: 20 U/L / AST: 26 U/L / GGT: x           PT/INR - ( 28 Dec 2021 03:17 )   PT: 12.0 sec;   INR: 1.00 ratio         PTT - ( 28 Dec 2021 03:17 )  PTT:27.8 sec                            12.3   5.43  )-----------( 159      ( 29 Dec 2021 06:39 )             35.5                         12.5   4.26  )-----------( 151      ( 28 Dec 2021 03:17 )             36.2       Imaging:          
    Interval Events:   Patient reports still having some abd pain but better.     Hospital Medications:  atorvastatin 40 milliGRAM(s) Oral at bedtime  dextrose 40% Gel 15 Gram(s) Oral once  dextrose 5%. 1000 milliLiter(s) IV Continuous <Continuous>  dextrose 5%. 1000 milliLiter(s) IV Continuous <Continuous>  dextrose 50% Injectable 25 Gram(s) IV Push once  dextrose 50% Injectable 12.5 Gram(s) IV Push once  dextrose 50% Injectable 25 Gram(s) IV Push once  glucagon  Injectable 1 milliGRAM(s) IntraMuscular once  influenza  Vaccine (HIGH DOSE) 0.7 milliLiter(s) IntraMuscular once  insulin glargine Injectable (LANTUS) 8 Unit(s) SubCutaneous at bedtime  insulin lispro (ADMELOG) corrective regimen sliding scale   SubCutaneous three times a day before meals  insulin lispro (ADMELOG) corrective regimen sliding scale   SubCutaneous at bedtime  insulin lispro Injectable (ADMELOG) 5 Unit(s) SubCutaneous three times a day before meals  lacosamide 50 milliGRAM(s) Oral two times a day  lactated ringers. 1000 milliLiter(s) IV Continuous <Continuous>  levETIRAcetam 500 milliGRAM(s) Oral two times a day  pantoprazole    Tablet 40 milliGRAM(s) Oral two times a day  polyethylene glycol 3350 17 Gram(s) Oral two times a day  senna 2 Tablet(s) Oral at bedtime      ROS: All system reviewed and negative except as mentioned above.    PHYSICAL EXAM:   Vital Signs:  Vital Signs Last 24 Hrs  T(C): 36.3 (26 Dec 2021 12:13), Max: 36.6 (25 Dec 2021 21:09)  T(F): 97.4 (26 Dec 2021 12:13), Max: 97.8 (25 Dec 2021 21:09)  HR: 86 (26 Dec 2021 12:13) (75 - 86)  BP: 117/77 (26 Dec 2021 12:13) (108/70 - 117/77)  BP(mean): --  RR: 18 (26 Dec 2021 12:13) (18 - 18)  SpO2: 99% (26 Dec 2021 12:13) (96% - 99%)  Daily     Daily     GENERAL:  NAD, Appears stated age  HEENT:  NC/AT,  conjunctivae clear and pink, sclera -anicteric  CHEST:  CTA B/L, Normal effort  HEART:  RRR S1/S2,  ABDOMEN:  Soft, mild discomfort in epigastric region  EXTREMITIES:  No cyanosis or Edema  SKIN:  Warm & Dry. No rash or erythema  NEURO:  Alert, oriented, no focal deficit    LABS:                        12.1   5.49  )-----------( 170      ( 25 Dec 2021 07:04 )             35.6           132<L>  |  100  |  7   ----------------------------<  228<H>  4.3   |  20<L>  |  0.47<L>    Ca    9.0      25 Dec 2021 07:04  Phos  2.8       Mg     2.4         TPro  6.5  /  Alb  4.0  /  TBili  0.4  /  DBili  x   /  AST  32  /  ALT  23  /  AlkPhos  71      LIVER FUNCTIONS - ( 25 Dec 2021 07:04 )  Alb: 4.0 g/dL / Pro: 6.5 g/dL / ALK PHOS: 71 U/L / ALT: 23 U/L / AST: 32 U/L / GGT: x           PT/INR - ( 25 Dec 2021 07:08 )   PT: 12.1 sec;   INR: 1.01 ratio         PTT - ( 25 Dec 2021 07:08 )  PTT:28.2 sec  Urinalysis Basic - ( 24 Dec 2021 16:23 )    Color: Light Yellow / Appearance: Clear / S.010 / pH: x  Gluc: x / Ketone: Small  / Bili: Negative / Urobili: Negative   Blood: x / Protein: Negative / Nitrite: Negative   Leuk Esterase: Negative / RBC: x / WBC x   Sq Epi: x / Non Sq Epi: x / Bacteria: x                              12.1   5.49  )-----------( 170      ( 25 Dec 2021 07:04 )             35.6                         14.0   5.51  )-----------(       ( 24 Dec 2021 15:50 )             41.7       Imaging: Images reviewed no new images appreciated.    
SURGERY DAILY PROGRESS NOTE:     SUBJECTIVE/ROS: Patient seen and examined. She denies abdominal pain, n/v. Denies pain. Tolerating diet well.     MEDICATIONS  (STANDING):  atorvastatin 40 milliGRAM(s) Oral at bedtime  dextrose 40% Gel 15 Gram(s) Oral once  dextrose 50% Injectable 25 Gram(s) IV Push once  dextrose 50% Injectable 12.5 Gram(s) IV Push once  dextrose 50% Injectable 25 Gram(s) IV Push once  glucagon  Injectable 1 milliGRAM(s) IntraMuscular once  heparin   Injectable 5000 Unit(s) SubCutaneous every 12 hours  influenza  Vaccine (HIGH DOSE) 0.7 milliLiter(s) IntraMuscular once  insulin glargine Injectable (LANTUS) 3 Unit(s) SubCutaneous at bedtime  insulin lispro (ADMELOG) corrective regimen sliding scale   SubCutaneous at bedtime  insulin lispro (ADMELOG) corrective regimen sliding scale   SubCutaneous three times a day before meals  insulin lispro Injectable (ADMELOG) 5 Unit(s) SubCutaneous three times a day before meals  lacosamide 50 milliGRAM(s) Oral two times a day  levETIRAcetam 500 milliGRAM(s) Oral two times a day  multivitamin 1 Tablet(s) Oral daily  pantoprazole    Tablet 40 milliGRAM(s) Oral two times a day  polyethylene glycol 3350 17 Gram(s) Oral two times a day  sodium chloride 1 Gram(s) Oral three times a day  sucralfate 1 Gram(s) Oral two times a day    MEDICATIONS  (PRN):      OBJECTIVE:    Vital Signs Last 24 Hrs  T(C): 36.7 (30 Dec 2021 04:36), Max: 36.7 (29 Dec 2021 19:50)  T(F): 98 (30 Dec 2021 04:36), Max: 98 (29 Dec 2021 19:50)  HR: 67 (30 Dec 2021 04:36) (67 - 86)  BP: 108/67 (30 Dec 2021 04:36) (106/67 - 108/67)  BP(mean): --  RR: 18 (30 Dec 2021 04:36) (18 - 18)  SpO2: 99% (30 Dec 2021 04:36) (98% - 99%)        I&O's Detail    29 Dec 2021 07:01  -  30 Dec 2021 07:00  --------------------------------------------------------  IN:    Oral Fluid: 760 mL    Sodium Chloride 0.9% Bolus: 250 mL  Total IN: 1010 mL    OUT:    Voided (mL): 1 mL  Total OUT: 1 mL    Total NET: 1009 mL          Daily     Daily     LABS:                        12.3   5.43  )-----------( 159      ( 29 Dec 2021 06:39 )             35.5     12-30    127<L>  |  94<L>  |  6<L>  ----------------------------<  228<H>  4.5   |  23  |  0.57    Ca    8.3<L>      30 Dec 2021 06:46                    PHYSICAL EXAM:  GEN: resting in bed comfortably in NAD  NEURO: awake, alert  CV: warm, well-perfused  RESP: no increased WOB  ABD: soft, non-distended, tender to palpation in epigastric region and RUQ only without rebound tenderness or guarding  EXTR: no gross deformities; spontaneous movement in b/l U/L extrem       
    Chief Complaint: T1DM    History: BG was low this morning. Patient eating. BG variable throughout day.    MEDICATIONS  (STANDING):  atorvastatin 40 milliGRAM(s) Oral at bedtime  dextrose 40% Gel 15 Gram(s) Oral once  dextrose 5%. 1000 milliLiter(s) (50 mL/Hr) IV Continuous <Continuous>  dextrose 5%. 1000 milliLiter(s) (100 mL/Hr) IV Continuous <Continuous>  dextrose 50% Injectable 25 Gram(s) IV Push once  dextrose 50% Injectable 12.5 Gram(s) IV Push once  dextrose 50% Injectable 25 Gram(s) IV Push once  glucagon  Injectable 1 milliGRAM(s) IntraMuscular once  influenza  Vaccine (HIGH DOSE) 0.7 milliLiter(s) IntraMuscular once  insulin glargine Injectable (LANTUS) 8 Unit(s) SubCutaneous at bedtime  insulin lispro (ADMELOG) corrective regimen sliding scale   SubCutaneous three times a day before meals  insulin lispro (ADMELOG) corrective regimen sliding scale   SubCutaneous at bedtime  insulin lispro Injectable (ADMELOG) 5 Unit(s) SubCutaneous three times a day before meals  lacosamide 50 milliGRAM(s) Oral two times a day  lactated ringers. 1000 milliLiter(s) (100 mL/Hr) IV Continuous <Continuous>  levETIRAcetam 500 milliGRAM(s) Oral two times a day  pantoprazole    Tablet 40 milliGRAM(s) Oral two times a day  polyethylene glycol 3350 17 Gram(s) Oral two times a day  senna 2 Tablet(s) Oral at bedtime    MEDICATIONS  (PRN):      PHYSICAL EXAM:  VITALS: T(C): 36.3 (12-26-21 @ 12:13)  T(F): 97.4 (12-26-21 @ 12:13), Max: 97.8 (12-25-21 @ 21:09)  HR: 86 (12-26-21 @ 12:13) (75 - 86)  BP: 117/77 (12-26-21 @ 12:13) (108/70 - 117/77)  RR:  (18 - 18)  SpO2:  (96% - 99%)  Wt(kg): --  General: Well-developed female, No acute distress, Speaking full sentences.   Eye:  Extraocular movements are intact, No proptosis or lid lag.   Respiratory:  Respirations are non-labored, Symmetric chest wall expansion.  Cardiovascular:  Normal rate, Regular rhythm, No edema.  Gastrointestinal:  Soft, mildly tender diffusely, Non-distended.   Integumentary:  Warm, dry. + vitiligo.    POCT Blood Glucose.: 124 mg/dL (12-26-21 @ 17:51)  POCT Blood Glucose.: 276 mg/dL (12-26-21 @ 12:30)  POCT Blood Glucose.: 70 mg/dL (12-26-21 @ 08:51)  POCT Blood Glucose.: 66 mg/dL (12-26-21 @ 08:41)  POCT Blood Glucose.: 56 mg/dL (12-26-21 @ 08:40)  POCT Blood Glucose.: 253 mg/dL (12-25-21 @ 22:11)  POCT Blood Glucose.: 299 mg/dL (12-25-21 @ 12:18)  POCT Blood Glucose.: 203 mg/dL (12-25-21 @ 08:57)  POCT Blood Glucose.: 189 mg/dL (12-25-21 @ 00:07)  POCT Blood Glucose.: 169 mg/dL (12-24-21 @ 21:54)  POCT Blood Glucose.: 289 mg/dL (12-24-21 @ 12:59)      12-25    132<L>  |  100  |  7   ----------------------------<  228<H>  4.3   |  20<L>  |  0.47<L>    EGFR if : 119  EGFR if non : 103    Ca    9.0      12-25  Mg     2.4     12-25  Phos  2.8     12-25    TPro  6.5  /  Alb  4.0  /  TBili  0.4  /  DBili  x   /  AST  32  /  ALT  23  /  AlkPhos  71  12-25          Thyroid Function Tests:  12-25 @ 09:24 TSH 2.92 FreeT4 -- T3 -- Anti TPO -- Anti Thyroglobulin Ab -- TSI --                    
DIABETES FOLLOW UP NOTE: Saw pt earlier today  INTERVAL HX: Pt stable, reports tolerating POs but she is NPO again today. Pt had MRCP done yesterday and will have ERCP done today. BG levels had been erratic due to on/off NPO status. Pt reports she didn't get dinner yesterday but had a tuna [place] after 8pm last night with BG levels going up to 400s at 12mn because she didn't get any premeal insulin with food. Received 4 units correction at 12mn  with FBG in 70s. Team administered D50 with BG 200s at 12noon. No further hypoglycemia or hypoglycemic symptoms. Pt reports some weakness and numbness in R arm for the past 2 weeks > primary team made aware.          Review of Systems:  General: As above  Cardiovascular: No chest pain, palpitations  Respiratory: No SOB, no cough  GI: No nausea, vomiting, abdominal pain  Endocrine: No polyuria, polydipsia or S&Sx of hypoglycemia    Allergies    penicillin (Headache)    Intolerances      MEDICATIONS:  atorvastatin 40 milliGRAM(s) Oral at bedtime  insulin glargine Injectable (LANTUS) 3 Unit(s) SubCutaneous at bedtime  insulin lispro (ADMELOG) corrective regimen sliding scale   SubCutaneous every 6 hours  insulin lispro Injectable (ADMELOG) 5 Unit(s) SubCutaneous three times a day before meals      PHYSICAL EXAM:  VITALS: T(C): 36.3 (12-28-21 @ 16:10)  T(F): 97.3 (12-28-21 @ 16:10), Max: 97.7 (12-28-21 @ 11:43)  HR: 68 (12-28-21 @ 16:10) (68 - 82)  BP: 121/77 (12-28-21 @ 16:10) (99/61 - 133/61)  RR:  (14 - 20)  SpO2:  (96% - 100%)  Wt(kg): --  GENERAL: Female sitting at edge of bed in NAD  Abdomen: Soft, nontender, non distended  Extremities: Warm, no edema in all 4 exts  NEURO: A&O X3. Minimal weakness noted in RUE but pt states numbness at touch    LABS:  POCT Blood Glucose.: 233 mg/dL (12-28-21 @ 12:06)  POCT Blood Glucose.: 170 mg/dL (12-28-21 @ 06:38)  POCT Blood Glucose.: 196 mg/dL (12-28-21 @ 06:13)  POCT Blood Glucose.: 78 mg/dL (12-28-21 @ 05:41)  POCT Blood Glucose.: 226 mg/dL (12-28-21 @ 02:37)  POCT Blood Glucose.: 427 mg/dL (12-28-21 @ 00:33)  POCT Blood Glucose.: 356 mg/dL (12-27-21 @ 22:22)  POCT Blood Glucose.: 152 mg/dL (12-27-21 @ 17:37)  POCT Blood Glucose.: 171 mg/dL (12-27-21 @ 10:29)  POCT Blood Glucose.: 97 mg/dL (12-27-21 @ 06:05)  POCT Blood Glucose.: 125 mg/dL (12-27-21 @ 00:20)  POCT Blood Glucose.: 156 mg/dL (12-26-21 @ 22:44)  POCT Blood Glucose.: 124 mg/dL (12-26-21 @ 17:51)  POCT Blood Glucose.: 276 mg/dL (12-26-21 @ 12:30)  POCT Blood Glucose.: 70 mg/dL (12-26-21 @ 08:51)  POCT Blood Glucose.: 66 mg/dL (12-26-21 @ 08:41)  POCT Blood Glucose.: 56 mg/dL (12-26-21 @ 08:40)  POCT Blood Glucose.: 253 mg/dL (12-25-21 @ 22:11)  POCT Blood Glucose.: 270 mg/dL (12-25-21 @ 17:36)                            12.5   4.26  )-----------( 151      ( 28 Dec 2021 03:17 )             36.2       12-28    130<L>  |  97  |  16  ----------------------------<  214<H>  3.6   |  22  |  0.93    EGFR if non : 64    Ca    9.2      12-28    TPro  6.4  /  Alb  3.6  /  TBili  0.3  /  DBili  x   /  AST  26  /  ALT  20  /  AlkPhos  72  12-28      Thyroid Function Tests:  12-25 @ 09:24 TSH 2.92 FreeT4 -- T3 -- Anti TPO -- Anti Thyroglobulin Ab -- TSI --      A1C with Estimated Average Glucose Result: 8.5 % (12-25-21 @ 14:04)      Estimated Average Glucose: 197 mg/dL (12-25-21 @ 14:04)        12-25 Chol 146 Direct LDL -- LDL calculated 52 HDL 86 Trig 38              
INTERNAL MEDICINE PROGRESS NOTE    Dr. Louis Prather, DO  Hospitalist  Division of Hospital Medicine  [facility]  Available via Microsoft Teams      SUBJECTIVE:  Abd pain improved.    REVIEW OF SYSTEMS   12 point review of systems negative except for above.     PAST MEDICAL & SURGICAL HISTORY:  HLD (hyperlipidemia)    DM (diabetes mellitus)  Type 1    No significant past surgical history        MEDICATIONS  (STANDING):  atorvastatin 40 milliGRAM(s) Oral at bedtime  benzocaine 15 mG/menthol 3.6 mG (Sugar-Free) Lozenge 1 Lozenge Oral two times a day  dextrose 40% Gel 15 Gram(s) Oral once  dextrose 50% Injectable 25 Gram(s) IV Push once  dextrose 50% Injectable 12.5 Gram(s) IV Push once  dextrose 50% Injectable 25 Gram(s) IV Push once  glucagon  Injectable 1 milliGRAM(s) IntraMuscular once  heparin   Injectable 5000 Unit(s) SubCutaneous every 12 hours  influenza  Vaccine (HIGH DOSE) 0.7 milliLiter(s) IntraMuscular once  insulin glargine Injectable (LANTUS) 3 Unit(s) SubCutaneous at bedtime  insulin lispro (ADMELOG) corrective regimen sliding scale   SubCutaneous Before meals and at bedtime  insulin lispro (ADMELOG) corrective regimen sliding scale   SubCutaneous at bedtime  insulin lispro Injectable (ADMELOG) 5 Unit(s) SubCutaneous three times a day before meals  lacosamide 50 milliGRAM(s) Oral two times a day  levETIRAcetam 500 milliGRAM(s) Oral two times a day  multivitamin 1 Tablet(s) Oral daily  pantoprazole    Tablet 40 milliGRAM(s) Oral two times a day  polyethylene glycol 3350 17 Gram(s) Oral two times a day  senna 2 Tablet(s) Oral at bedtime  sodium chloride 0.9% Bolus 500 milliLiter(s) IV Bolus once  sucralfate 1 Gram(s) Oral two times a day    MEDICATIONS  (PRN):      Allergies    penicillin (Headache)    Intolerances        T(C): 36.6 (12-29-21 @ 12:22), Max: 36.6 (12-29-21 @ 12:22)  T(F): 97.8 (12-29-21 @ 12:22), Max: 97.8 (12-29-21 @ 12:22)  HR: 72 (12-29-21 @ 12:22) (63 - 82)  BP: 108/64 (12-29-21 @ 12:22) (108/64 - 155/72)  ABP: --  ABP(mean): --  RR: 18 (12-29-21 @ 12:22) (14 - 20)  SpO2: 98% (12-29-21 @ 12:22) (97% - 100%)      CONSTITUTIONAL: No acute distress.   HEENT:  Conjunctiva clear B/L.  Moist oral mucosa.   Cardiovascular: RRR with no murmurs. No JVD noted. No lower extremity edema B/L. Extremities are warm and well perfused. Radial pulses 2+ B/L. Dorsalis pedis pulses 2+ B/L.    Respiratory: Lungs CTAB. No wrr. No accessory muscle use.   Gastrointestinal:  Soft, nontender. Non-distended. Non-rigid. No CVA tenderness B/L.  MSK:  No joint swelling. No joint erythema B/L. No midline spinal tenderness.  Neurologic:  Alert and awake. Moving all extremities. Following commands. Making eye contact.    Skin:  No rashes noted. No skin erythema noted.   Psych:  Normal affect. Normal Mood.     LABS                        12.3   5.43  )-----------( 159      ( 29 Dec 2021 06:39 )             35.5     12-29    128<L>  |  95<L>  |  11  ----------------------------<  235<H>  4.5   |  21<L>  |  0.67    Ca    9.2      29 Dec 2021 06:39    TPro  6.4  /  Alb  3.6  /  TBili  0.3  /  DBili  x   /  AST  26  /  ALT  20  /  AlkPhos  72  12-28    PT/INR - ( 28 Dec 2021 03:17 )   PT: 12.0 sec;   INR: 1.00 ratio         PTT - ( 28 Dec 2021 03:17 )  PTT:27.8 sec      ALL RECENT STUDIES REVIEWED INCLUDING REPORTS AVAILABLE     CARE DISCUSSED WITH ALL CONSULTANTS   
DIABETES FOLLOW UP NOTE: Saw pt earlier today  INTERVAL HX: Pt stable, reports tolerating clear liquid diet but per team balancing to regular food, Noted events in the last 24 hours. Pt had ERCP done yesterday and again had late dinner and didn't receive premeal insulin doses with BG >500s at bedtime with mild DKA which was corrected with insulin administration. BG improved today but remains hyperglycemic. No further hypoglycemia. Has some abdominal discomfort after ERCP but eating well.      Review of Systems:  General: As above  Cardiovascular: No chest pain, palpitations  Respiratory: No SOB, no cough  GI: No nausea, vomiting, abdominal pain  Endocrine: No polyuria, polydipsia or S&Sx of hypoglycemia    Allergies    penicillin (Headache)    Intolerances      MEDICATIONS:  atorvastatin 40 milliGRAM(s) Oral at bedtime  insulin glargine Injectable (LANTUS) 3 Unit(s) SubCutaneous at bedtime  insulin lispro (ADMELOG) corrective regimen sliding scale   SubCutaneous Before meals and at bedtime  insulin lispro (ADMELOG) corrective regimen sliding scale   SubCutaneous at bedtime  insulin lispro Injectable (ADMELOG) 5 Unit(s) SubCutaneous three times a day before meals        PHYSICAL EXAM:  VITALS: T(C): 36.6 (12-29-21 @ 12:22)  T(F): 97.8 (12-29-21 @ 12:22), Max: 97.8 (12-29-21 @ 12:22)  HR: 72 (12-29-21 @ 12:22) (72 - 80)  BP: 108/64 (12-29-21 @ 12:22) (108/64 - 119/84)  RR:  (18 - 18)  SpO2:  (98% - 100%)  Wt(kg): --  GENERAL: Female sitting in bed in NAD  Abdomen: Soft, nontender, non distended  Extremities: Warm, no edema in all 4 exts  NEURO: A&O X3    LABS:  POCT Blood Glucose.: 178 mg/dL (12-29-21 @ 16:15)  POCT Blood Glucose.: 346 mg/dL (12-29-21 @ 12:51)  POCT Blood Glucose.: 218 mg/dL (12-29-21 @ 08:31)  POCT Blood Glucose.: 258 mg/dL (12-29-21 @ 02:00)  POCT Blood Glucose.: 564 mg/dL (12-28-21 @ 21:20)  POCT Blood Glucose.: 544 mg/dL (12-28-21 @ 21:18)  POCT Blood Glucose.: 147 mg/dL (12-28-21 @ 17:27)  POCT Blood Glucose.: 233 mg/dL (12-28-21 @ 12:06)  POCT Blood Glucose.: 170 mg/dL (12-28-21 @ 06:38)  POCT Blood Glucose.: 196 mg/dL (12-28-21 @ 06:13)  POCT Blood Glucose.: 78 mg/dL (12-28-21 @ 05:41)  POCT Blood Glucose.: 226 mg/dL (12-28-21 @ 02:37)  POCT Blood Glucose.: 427 mg/dL (12-28-21 @ 00:33)  POCT Blood Glucose.: 356 mg/dL (12-27-21 @ 22:22)  POCT Blood Glucose.: 152 mg/dL (12-27-21 @ 17:37)  POCT Blood Glucose.: 171 mg/dL (12-27-21 @ 10:29)  POCT Blood Glucose.: 97 mg/dL (12-27-21 @ 06:05)  POCT Blood Glucose.: 125 mg/dL (12-27-21 @ 00:20)  POCT Blood Glucose.: 156 mg/dL (12-26-21 @ 22:44)                            12.3   5.43  )-----------( 159      ( 29 Dec 2021 06:39 )             35.5       12-29    128<L>  |  95<L>  |  11  ----------------------------<  235<H>  4.5   |  21<L>  |  0.67    EGFR if non : 92    Ca    9.2      12-29    TPro  6.4  /  Alb  3.6  /  TBili  0.3  /  DBili  x   /  AST  26  /  ALT  20  /  AlkPhos  72  12-28      Thyroid Function Tests:  12-25 @ 09:24 TSH 2.92 FreeT4 -- T3 -- Anti TPO -- Anti Thyroglobulin Ab -- TSI --      A1C with Estimated Average Glucose Result: 8.5 % (12-25-21 @ 14:04)      Estimated Average Glucose: 197 mg/dL (12-25-21 @ 14:04)        12-25 Chol 146 Direct LDL -- LDL calculated 52 HDL 86 Trig 38              
INTERNAL MEDICINE PROGRESS NOTE    Dr. Louis Prather, DO  Hospitalist  Division of Hospital Medicine  [facility]  Available via Microsoft Teams      SUBJECTIVE:  No acute complaints.     REVIEW OF SYSTEMS   12 point review of systems negative except for above.     PAST MEDICAL & SURGICAL HISTORY:  HLD (hyperlipidemia)    DM (diabetes mellitus)  Type 1    No significant past surgical history        MEDICATIONS  (STANDING):  atorvastatin 40 milliGRAM(s) Oral at bedtime  dextrose 40% Gel 15 Gram(s) Oral once  dextrose 50% Injectable 25 Gram(s) IV Push once  dextrose 50% Injectable 12.5 Gram(s) IV Push once  dextrose 50% Injectable 25 Gram(s) IV Push once  heparin   Injectable 5000 Unit(s) SubCutaneous every 12 hours  influenza  Vaccine (HIGH DOSE) 0.7 milliLiter(s) IntraMuscular once  insulin glargine Injectable (LANTUS) 3 Unit(s) SubCutaneous at bedtime  insulin lispro (ADMELOG) corrective regimen sliding scale   SubCutaneous every 6 hours  insulin lispro Injectable (ADMELOG) 5 Unit(s) SubCutaneous three times a day before meals  lacosamide 50 milliGRAM(s) Oral two times a day  levETIRAcetam 500 milliGRAM(s) Oral two times a day  multivitamin 1 Tablet(s) Oral daily  pantoprazole    Tablet 40 milliGRAM(s) Oral two times a day  polyethylene glycol 3350 17 Gram(s) Oral two times a day  senna 2 Tablet(s) Oral at bedtime    MEDICATIONS  (PRN):      Allergies    penicillin (Headache)    Intolerances        T(C): 36.4 (12-28-21 @ 04:25), Max: 36.4 (12-27-21 @ 20:39)  T(F): 97.6 (12-28-21 @ 04:25), Max: 97.6 (12-28-21 @ 04:25)  HR: 76 (12-28-21 @ 04:42) (71 - 76)  BP: 108/66 (12-28-21 @ 04:42) (99/61 - 131/76)  ABP: --  ABP(mean): --  RR: 18 (12-28-21 @ 04:25) (18 - 18)  SpO2: 96% (12-28-21 @ 04:25) (96% - 99%)    CONSTITUTIONAL: No acute distress.   HEENT:  Conjunctiva clear B/L.  Moist oral mucosa.   Cardiovascular: RRR with no murmurs. No JVD noted. No lower extremity edema B/L. Extremities are warm and well perfused. Radial pulses 2+ B/L. Dorsalis pedis pulses 2+ B/L.    Respiratory: Lungs CTAB. No wrr. No accessory muscle use.   Gastrointestinal:  Soft; diffuse abd pain (mild). Non-distended. Non-rigid. No CVA tenderness B/L.  MSK:  No joint swelling. No joint erythema B/L. No midline spinal tenderness.  Neurologic:  Alert and awake. Moving all extremities. Following commands. Making eye contact.    Skin:  No rashes noted. No skin erythema noted.   Psych:  Normal affect. Normal Mood.     LABS                        12.5   4.26  )-----------( 151      ( 28 Dec 2021 03:17 )             36.2     12-28    130<L>  |  97  |  16  ----------------------------<  214<H>  3.6   |  22  |  0.93    Ca    9.2      28 Dec 2021 03:17    TPro  6.4  /  Alb  3.6  /  TBili  0.3  /  DBili  x   /  AST  26  /  ALT  20  /  AlkPhos  72  12-28    PT/INR - ( 28 Dec 2021 03:17 )   PT: 12.0 sec;   INR: 1.00 ratio         PTT - ( 28 Dec 2021 03:17 )  PTT:27.8 sec      ALL RECENT STUDIES REVIEWED INCLUDING REPORTS AVAILABLE     CARE DISCUSSED WITH ALL CONSULTANTS   
[facility] Division of Hospital Medicine  Kyle Riojas DO  Pager (JOSSELINE-F, 2P-1R): [phone]  Other Times:  [phone]    Patient is a 66y old  Female who presents with a chief complaint of abd pain, constipation, lightheadedness (25 Dec 2021 10:22)      SUBJECTIVE / OVERNIGHT EVENTS:    feels better. abdominal pain much improved. no nausea or vomiting.    MEDICATIONS  (STANDING):  atorvastatin 40 milliGRAM(s) Oral at bedtime  dextrose 40% Gel 15 Gram(s) Oral once  dextrose 5%. 1000 milliLiter(s) (50 mL/Hr) IV Continuous <Continuous>  dextrose 5%. 1000 milliLiter(s) (100 mL/Hr) IV Continuous <Continuous>  dextrose 50% Injectable 25 Gram(s) IV Push once  dextrose 50% Injectable 12.5 Gram(s) IV Push once  dextrose 50% Injectable 25 Gram(s) IV Push once  glucagon  Injectable 1 milliGRAM(s) IntraMuscular once  influenza  Vaccine (HIGH DOSE) 0.7 milliLiter(s) IntraMuscular once  insulin glargine Injectable (LANTUS) 2 Unit(s) SubCutaneous at bedtime  insulin lispro (ADMELOG) corrective regimen sliding scale   SubCutaneous three times a day before meals  insulin lispro (ADMELOG) corrective regimen sliding scale   SubCutaneous at bedtime  insulin lispro Injectable (ADMELOG) 5 Unit(s) SubCutaneous three times a day before meals  lacosamide 50 milliGRAM(s) Oral two times a day  lactated ringers. 1000 milliLiter(s) (100 mL/Hr) IV Continuous <Continuous>  levETIRAcetam 500 milliGRAM(s) Oral two times a day  pantoprazole    Tablet 40 milliGRAM(s) Oral two times a day  polyethylene glycol 3350 17 Gram(s) Oral two times a day  senna 2 Tablet(s) Oral at bedtime    MEDICATIONS  (PRN):      CAPILLARY BLOOD GLUCOSE      POCT Blood Glucose.: 299 mg/dL (25 Dec 2021 12:18)  POCT Blood Glucose.: 203 mg/dL (25 Dec 2021 08:57)  POCT Blood Glucose.: 189 mg/dL (25 Dec 2021 00:07)  POCT Blood Glucose.: 169 mg/dL (24 Dec 2021 21:54)    I&O's Summary    24 Dec 2021 07:01  -  25 Dec 2021 07:00  --------------------------------------------------------  IN: 600 mL / OUT: 0 mL / NET: 600 mL        PHYSICAL EXAM:  Vital Signs Last 24 Hrs  T(C): 36.7 (25 Dec 2021 11:15), Max: 36.7 (25 Dec 2021 11:15)  T(F): 98 (25 Dec 2021 11:15), Max: 98 (25 Dec 2021 11:15)  HR: 73 (25 Dec 2021 11:15) (68 - 90)  BP: 96/58 (25 Dec 2021 11:15) (96/58 - 146/80)  BP(mean): 71 (25 Dec 2021 11:15) (71 - 71)  RR: 16 (25 Dec 2021 11:15) (16 - 18)  SpO2: 99% (25 Dec 2021 11:15) (98% - 100%)    CONSTITUTIONAL: NAD, well-developed, well-groomed  EYES: PERRL; conjunctiva and sclera clear  NECK: Supple, no palpable masses; no thyromegaly  RESPIRATORY: Normal respiratory effort; lungs are clear to auscultation bilaterally  CARDIOVASCULAR: Regular rate and rhythm, normal S1 and S2, no murmur/rub/gallop; No lower extremity edema; Peripheral pulses are 2+ bilaterally  ABDOMEN: Nontender to palpation, normoactive bowel sounds, no rebound/guarding; No hepatosplenomegaly  MUSCULOSKELETAL:  Normal gait; no clubbing or cyanosis of digits; no joint swelling or tenderness to palpation  PSYCH: A+O to person, place, and time; affect appropriate  NEUROLOGY: CN 2-12 are intact and symmetric; no gross sensory deficits   SKIN: No rashes; no palpable lesions    LABS:                        12.1   5.49  )-----------( 170      ( 25 Dec 2021 07:04 )             35.6     12    132<L>  |  100  |  7   ----------------------------<  228<H>  4.3   |  20<L>  |  0.47<L>    Ca    9.0      25 Dec 2021 07:04  Phos  2.8       Mg     2.4         TPro  6.5  /  Alb  4.0  /  TBili  0.4  /  DBili  x   /  AST  32  /  ALT  23  /  AlkPhos  71      PT/INR - ( 25 Dec 2021 07:08 )   PT: 12.1 sec;   INR: 1.01 ratio         PTT - ( 25 Dec 2021 07:08 )  PTT:28.2 sec      Urinalysis Basic - ( 24 Dec 2021 16:23 )    Color: Light Yellow / Appearance: Clear / S.010 / pH: x  Gluc: x / Ketone: Small  / Bili: Negative / Urobili: Negative   Blood: x / Protein: Negative / Nitrite: Negative   Leuk Esterase: Negative / RBC: x / WBC x   Sq Epi: x / Non Sq Epi: x / Bacteria: x          RADIOLOGY & ADDITIONAL TESTS:  Results Reviewed:   Imaging Personally Reviewed:  Electrocardiogram Personally Reviewed:    COORDINATION OF CARE:  Care Discussed with Consultants/Other Providers [Y/N]:  Prior or Outpatient Records Reviewed [Y/N]:  kyle Chan
Diabetes Follow up note:    Chief complaint: T1DM    Interval Hx: Pt w/hypoglycemia to 30/40s last night prior to bedtime. Pt endorsed on clear liquid diet yesterday and had constipation so didn't consume any carbs at dinnertime. Diet advanced this AM, tolerating well. Pt asking if she can go home today. Plan for cholecystectomy as outpt. Pt wears a Freestyle Cecilia at home.     Review of Systems:  General: no complaints.   GI: Tolerating POs. Denies N/V/D/Abd pain  CV: Denies CP/SOB  ENDO: No S&Sx of hypoglycemia    MEDS:  atorvastatin 40 milliGRAM(s) Oral at bedtime  insulin glargine Injectable (LANTUS) 3 Unit(s) SubCutaneous at bedtime  insulin lispro (ADMELOG) corrective regimen sliding scale   SubCutaneous at bedtime  insulin lispro (ADMELOG) corrective regimen sliding scale   SubCutaneous three times a day before meals  insulin lispro Injectable (ADMELOG) 5 Unit(s) SubCutaneous three times a day before meals      Allergies    penicillin (Headache)        PE:  General: Female sitting in bed. NAD.   Vital Signs Last 24 Hrs  T(C): 36.7 (30 Dec 2021 04:36), Max: 36.7 (29 Dec 2021 19:50)  T(F): 98 (30 Dec 2021 04:36), Max: 98 (29 Dec 2021 19:50)  HR: 67 (30 Dec 2021 04:36) (67 - 86)  BP: 108/67 (30 Dec 2021 04:36) (106/67 - 108/67)  BP(mean): --  RR: 18 (30 Dec 2021 04:36) (18 - 18)  SpO2: 99% (30 Dec 2021 04:36) (98% - 99%)  Abd: Soft, NT,ND,   Extremities: Warm  Neuro: A&O X3    LABS:  POCT Blood Glucose.: 150 mg/dL (12-30-21 @ 08:34)  POCT Blood Glucose.: 254 mg/dL (12-30-21 @ 02:47)  POCT Blood Glucose.: 241 mg/dL (12-29-21 @ 23:00)  POCT Blood Glucose.: 122 mg/dL (12-29-21 @ 22:22)  POCT Blood Glucose.: 48 mg/dL (12-29-21 @ 21:59)  POCT Blood Glucose.: 39 mg/dL (12-29-21 @ 21:37)  POCT Blood Glucose.: 46 mg/dL (12-29-21 @ 21:34)  POCT Blood Glucose.: 178 mg/dL (12-29-21 @ 16:15)  POCT Blood Glucose.: 346 mg/dL (12-29-21 @ 12:51)  POCT Blood Glucose.: 218 mg/dL (12-29-21 @ 08:31)  POCT Blood Glucose.: 258 mg/dL (12-29-21 @ 02:00)  POCT Blood Glucose.: 564 mg/dL (12-28-21 @ 21:20)  POCT Blood Glucose.: 544 mg/dL (12-28-21 @ 21:18)  POCT Blood Glucose.: 147 mg/dL (12-28-21 @ 17:27)  POCT Blood Glucose.: 233 mg/dL (12-28-21 @ 12:06)  POCT Blood Glucose.: 170 mg/dL (12-28-21 @ 06:38)  POCT Blood Glucose.: 196 mg/dL (12-28-21 @ 06:13)  POCT Blood Glucose.: 78 mg/dL (12-28-21 @ 05:41)  POCT Blood Glucose.: 226 mg/dL (12-28-21 @ 02:37)  POCT Blood Glucose.: 427 mg/dL (12-28-21 @ 00:33)  POCT Blood Glucose.: 356 mg/dL (12-27-21 @ 22:22)  POCT Blood Glucose.: 152 mg/dL (12-27-21 @ 17:37)                            12.3   5.43  )-----------( 159      ( 29 Dec 2021 06:39 )             35.5       12-30    127<L>  |  94<L>  |  6<L>  ----------------------------<  228<H>  4.5   |  23  |  0.57    Ca    8.3<L>      30 Dec 2021 06:46        Thyroid Function Tests:  12-25 @ 09:24 TSH 2.92 FreeT4 -- T3 -- Anti TPO -- Anti Thyroglobulin Ab -- TSI --      A1C with Estimated Average Glucose Result: 8.5 % (12-25-21 @ 14:04)          Contact number: martita [phone] or [phone]      
INTERNAL MEDICINE PROGRESS NOTE    Dr. Louis Prather, DO  Hospitalist  Division of Hospital Medicine  [facility]  Available via Microsoft Teams      SUBJECTIVE:  Dull diffuse abdominal pain; same as prior.     REVIEW OF SYSTEMS   12 point review of systems negative except for above.     PAST MEDICAL & SURGICAL HISTORY:  HLD (hyperlipidemia)    DM (diabetes mellitus)  Type 1    No significant past surgical history        MEDICATIONS  (STANDING):  atorvastatin 40 milliGRAM(s) Oral at bedtime  dextrose 40% Gel 15 Gram(s) Oral once  dextrose 50% Injectable 25 Gram(s) IV Push once  dextrose 50% Injectable 12.5 Gram(s) IV Push once  dextrose 50% Injectable 25 Gram(s) IV Push once  influenza  Vaccine (HIGH DOSE) 0.7 milliLiter(s) IntraMuscular once  insulin glargine Injectable (LANTUS) 3 Unit(s) SubCutaneous at bedtime  insulin lispro (ADMELOG) corrective regimen sliding scale   SubCutaneous every 6 hours  insulin lispro Injectable (ADMELOG) 5 Unit(s) SubCutaneous three times a day before meals  lacosamide 50 milliGRAM(s) Oral two times a day  levETIRAcetam 500 milliGRAM(s) Oral two times a day  multivitamin 1 Tablet(s) Oral daily  pantoprazole    Tablet 40 milliGRAM(s) Oral two times a day  polyethylene glycol 3350 17 Gram(s) Oral two times a day  senna 2 Tablet(s) Oral at bedtime    MEDICATIONS  (PRN):      Allergies    penicillin (Headache)    Intolerances        T(C): 36.4 (12-27-21 @ 11:58), Max: 36.9 (12-27-21 @ 04:21)  T(F): 97.5 (12-27-21 @ 11:58), Max: 98.4 (12-27-21 @ 04:21)  HR: 71 (12-27-21 @ 11:58) (71 - 94)  BP: 135/69 (12-27-21 @ 11:58) (107/70 - 135/69)  ABP: --  ABP(mean): --  RR: 18 (12-27-21 @ 11:58) (18 - 18)  SpO2: 99% (12-27-21 @ 11:58) (96% - 99%)      CONSTITUTIONAL: No acute distress.   HEENT:  Conjunctiva clear B/L.  Moist oral mucosa.   Cardiovascular: RRR with no murmurs. No JVD noted. No lower extremity edema B/L. Extremities are warm and well perfused. Radial pulses 2+ B/L. Dorsalis pedis pulses 2+ B/L.    Respiratory: Lungs CTAB. No wrr. No accessory muscle use.   Gastrointestinal:  Soft; diffuse abd pain (mild). Non-distended. Non-rigid. No CVA tenderness B/L.  MSK:  No joint swelling. No joint erythema B/L. No midline spinal tenderness.  Neurologic:  Alert and awake. Moving all extremities. Following commands. Making eye contact.    Skin:  No rashes noted. No skin erythema noted.   Psych:  Normal affect. Normal Mood.     LABS    12-27    136  |  100  |  18  ----------------------------<  85  4.0   |  25  |  0.72    Ca    9.1      27 Dec 2021 07:15    TPro  6.3  /  Alb  3.9  /  TBili  0.2  /  DBili  x   /  AST  21  /  ALT  17  /  AlkPhos  70  12-27          ALL RECENT STUDIES REVIEWED INCLUDING REPORTS AVAILABLE     CARE DISCUSSED WITH ALL CONSULTANTS   
Santiago Crowe MD  Division of Hospital Medicine  Pager [phone]  If no response or off hours page: [phone]  ---------------------------------------------------------    HARPER DEXTER  66y  Female      Patient is a 66y old  Female who presents with a chief complaint of abd pain, constipation, lightheadedness (25 Dec 2021 15:56)      INTERVAL HPI/OVERNIGHT EVENTS:  Seen at bedside. Still with some RUQ pain but improved from admission      REVIEW OF SYSTEMS: 10 point ROS negative unless listed above    T(C): 36.3 (21 @ 12:13), Max: 36.6 (21 @ 21:09)  HR: 86 (21 @ 12:13) (75 - 86)  BP: 117/77 (21 @ 12:13) (108/70 - 117/77)  RR: 18 (21 @ 12:13) (18 - 18)  SpO2: 99% (21 @ 12:13) (96% - 99%)  Wt(kg): --Vital Signs Last 24 Hrs  T(C): 36.3 (26 Dec 2021 12:13), Max: 36.6 (25 Dec 2021 21:09)  T(F): 97.4 (26 Dec 2021 12:13), Max: 97.8 (25 Dec 2021 21:09)  HR: 86 (26 Dec 2021 12:13) (75 - 86)  BP: 117/77 (26 Dec 2021 12:13) (108/70 - 117/77)  BP(mean): --  RR: 18 (26 Dec 2021 12:13) (18 - 18)  SpO2: 99% (26 Dec 2021 12:) (96% - 99%)    PHYSICAL EXAM:  GENERAL: NAD, well-groomed, well-developed  NECK: Supple, No JVD  CHEST/LUNG: Clear to auscultation bilaterally; No rales, rhonchi, wheezing, or rubs  HEART: Regular rate and rhythm; No murmurs, rubs, or gallops  ABDOMEN: Soft, TTP in RUQ, Nondistended; Bowel sounds present.    EXTREMITIES:  2+ Peripheral Pulses, No clubbing, cyanosis, or edema  PSYCH: Alert & Oriented x3  SKIN: [place]    Consultant(s) Notes Reviewed:  [x ] YES  [ ] NO  Care Discussed with Consultants/Other Providers [ x] YES  [ ] NO    LABS:                        12.1   5.49  )-----------( 170      ( 25 Dec 2021 07:04 )             35.6         132<L>  |  100  |  7   ----------------------------<  228<H>  4.3   |  20<L>  |  0.47<L>    Ca    9.0      25 Dec 2021 07:04  Phos  2.8       Mg     2.4         TPro  6.5  /  Alb  4.0  /  TBili  0.4  /  DBili  x   /  AST  32  /  ALT  23  /  AlkPhos  71      PT/INR - ( 25 Dec 2021 07:08 )   PT: 12.1 sec;   INR: 1.01 ratio         PTT - ( 25 Dec 2021 07:08 )  PTT:28.2 sec  Urinalysis Basic - ( 24 Dec 2021 16:23 )    Color: Light Yellow / Appearance: Clear / S.010 / pH: x  Gluc: x / Ketone: Small  / Bili: Negative / Urobili: Negative   Blood: x / Protein: Negative / Nitrite: Negative   Leuk Esterase: Negative / RBC: x / WBC x   Sq Epi: x / Non Sq Epi: x / Bacteria: x      CAPILLARY BLOOD GLUCOSE      POCT Blood Glucose.: 276 mg/dL (26 Dec 2021 12:30)  POCT Blood Glucose.: 70 mg/dL (26 Dec 2021 08:51)  POCT Blood Glucose.: 66 mg/dL (26 Dec 2021 08:41)  POCT Blood Glucose.: 56 mg/dL (26 Dec 2021 08:40)  POCT Blood Glucose.: 253 mg/dL (25 Dec 2021 22:11)        Urinalysis Basic - ( 24 Dec 2021 16:23 )    Color: Light Yellow / Appearance: Clear / S.010 / pH: x  Gluc: x / Ketone: Small  / Bili: Negative / Urobili: Negative   Blood: x / Protein: Negative / Nitrite: Negative   Leuk Esterase: Negative / RBC: x / WBC x   Sq Epi: x / Non Sq Epi: x / Bacteria: x        RADIOLOGY & ADDITIONAL TESTS:    Imaging Personally Reviewed:  [ ] YES  [ ] NO
DIABETES FOLLOW UP NOTE: Saw pt earlier today  INTERVAL HX: Pt stable, reports tolerating POs but NPO today for ERCP. Still feels abdominal pain. No N/V reported. BG at goal in the last 24 hours. BG 97 at 6am by POC and  85 by BMP. Call staff to chack BG again to make sure pt was not hypoglycemia with . Pt denies any s/sx of hypoglycemia but only take 2 units of basal insulin at home. RN did  not test BG at 12 noon pr this pm so contacted RN to make sure POC is done and correction Admelog  scale is given as ordered.  No hypo/hyperglycemia while NPO. Pt states haing worsening BG levels in the last 2 month while at home but unable to determine cause. Pt reports her A1C is usually in 7s.       Review of Systems:  General: As above  Cardiovascular: No chest pain, palpitations  Respiratory: No SOB, no cough  GI: No nausea, vomiting, abdominal pain  Endocrine: No polyuria, polydipsia or S&Sx of hypoglycemia    Allergies    penicillin (Headache)    Intolerances      MEDICATIONS:  atorvastatin 40 milliGRAM(s) Oral at bedtime  insulin glargine Injectable (LANTUS) 3 Unit(s) SubCutaneous at bedtime  insulin lispro (ADMELOG) corrective regimen sliding scale   SubCutaneous every 6 hours  insulin lispro Injectable (ADMELOG) 5 Unit(s) SubCutaneous three times a day before meals    PHYSICAL EXAM:  VITALS: T(C): 36.4 (12-27-21 @ 11:58)  T(F): 97.5 (12-27-21 @ 11:58), Max: 98.4 (12-27-21 @ 04:21)  HR: 71 (12-27-21 @ 11:58) (71 - 94)  BP: 135/69 (12-27-21 @ 11:58) (107/70 - 135/69)  RR:  (18 - 18)  SpO2:  (96% - 99%)  Wt(kg): --  GENERAL: Thin female laying in bed in NAD  Abdomen: Soft, nontender, non distended  Extremities: Warm, no edema in all 4 exts  NEURO: A&O X3  Skin: Vitiligo noted in arms and face.     LABS:  POCT Blood Glucose.: 171 mg/dL (12-27-21 @ 10:29)  POCT Blood Glucose.: 97 mg/dL (12-27-21 @ 06:05)  POCT Blood Glucose.: 125 mg/dL (12-27-21 @ 00:20)  POCT Blood Glucose.: 156 mg/dL (12-26-21 @ 22:44)  POCT Blood Glucose.: 124 mg/dL (12-26-21 @ 17:51)  POCT Blood Glucose.: 276 mg/dL (12-26-21 @ 12:30)  POCT Blood Glucose.: 70 mg/dL (12-26-21 @ 08:51)  POCT Blood Glucose.: 66 mg/dL (12-26-21 @ 08:41)  POCT Blood Glucose.: 56 mg/dL (12-26-21 @ 08:40)  POCT Blood Glucose.: 253 mg/dL (12-25-21 @ 22:11)  POCT Blood Glucose.: 270 mg/dL (12-25-21 @ 17:36)  POCT Blood Glucose.: 299 mg/dL (12-25-21 @ 12:18)  POCT Blood Glucose.: 203 mg/dL (12-25-21 @ 08:57)  POCT Blood Glucose.: 189 mg/dL (12-25-21 @ 00:07)  POCT Blood Glucose.: 169 mg/dL (12-24-21 @ 21:54)                            12.1   5.49  )-----------( 170      ( 25 Dec 2021 07:04 )             35.6       12-27    136  |  100  |  18  ----------------------------<  85  4.0   |  25  |  0.72    EGFR if non : 87    Ca    9.1      12-27  Mg     2.4     12-25  Phos  2.8     12-25    TPro  6.3  /  Alb  3.9  /  TBili  0.2  /  DBili  x   /  AST  21  /  ALT  17  /  AlkPhos  70  12-27      Thyroid Function Tests:  12-25 @ 09:24 TSH 2.92 FreeT4 -- T3 -- Anti TPO -- Anti Thyroglobulin Ab -- TSI --      A1C with Estimated Average Glucose Result: 8.5 % (12-25-21 @ 14:04)      Estimated Average Glucose: 197 mg/dL (12-25-21 @ 14:04)        12-25 Chol 146 Direct LDL -- LDL calculated 52 HDL 86 Trig 38

## 2021-12-30 NOTE — PROGRESS NOTE ADULT - ASSESSMENT
67yo F with Hx HTN, DM1, and epilepsy (well-controlled on Keppra/Vimpat) who presented with 6 days of epigastric pain, found to have choledocholithiasis without evidence of acute cholecystitis. Patient is s/p ERCP on 12/28 with sphincterotomy and balloon extraction of sludge and a large black stone. Surgery consulted for cholecystectomy.     PLAN:   - No acute surgical intervention at this time   - Cholecystectomy OP, given patient does not have gallstone pancreatitis and had 6 days of pain/inflammation prior to presentation  - diet as tolerated      ACS [id number]     67yo F with Hx HTN, DM1, and epilepsy (well-controlled on Keppra/Vimpat) who presented with 6 days of epigastric pain, found to have choledocholithiasis without evidence of acute cholecystitis. Patient is s/p ERCP on 12/28 with sphincterotomy and balloon extraction of sludge and a large black stone. Surgery consulted for cholecystectomy.     PLAN:   - No acute surgical intervention at this time   - Cholecystectomy OP, given patient does not have gallstone pancreatitis and had 6 days of pain/inflammation prior to presentation  - diet as tolerated  - needs better glycemic control prior to cholecystectomy to ensure wound healing     ACS [id number]     67yo F with Hx HTN, DM1, and epilepsy (well-controlled on Keppra/Vimpat) who presented with 6 days of epigastric pain, found to have choledocholithiasis without evidence of acute cholecystitis. Patient is s/p ERCP on 12/28 with sphincterotomy and balloon extraction of sludge and a large black stone. Surgery consulted for cholecystectomy.     PLAN:   - No acute surgical intervention at this time   - Cholecystectomy OP, given patient does not have gallstone pancreatitis and had 6 days of pain/inflammation prior to presentation  - diet as tolerated  - needs better glycemic control prior to cholecystectomy to ensure wound healing   -Please follow up with Dr. Clayton or her partners outpatient for surgical planning. Can call [phone] to schedule appointment.   -Please page with any further questions.     ACS [id number]

## 2021-12-30 NOTE — PROGRESS NOTE ADULT - PROBLEM SELECTOR PLAN 3
- LDL goal <70. Pt's LDL 52  -C/w Crestor 20mg daily and Zetia 10 mg as put pt  - On atorvastatin 40 milliGRAM(s) Oral at bedtime while inpatient
rx as needed.
LDL goal <70. Pt's LDL 52  -C/w Crestor 20mg daily and Zetia 10 mg as put pt  - On atorvastatin 40 milliGRAM(s) Oral at bedtime while inpatient    discussed w/pt and medicine ACP  pager: [phone]   office:  [phone] (M-F 9a-5pm)               [phone] (nights/weekends)   [email or web address] password [email or web address]
- LDL goal <70. Pt's LDL 52  -C/w Crestor 20mg daily and Zetia 10 mg as put pt  - On atorvastatin 40 milliGRAM(s) Oral at bedtime while inpatient
- LDL goal <70. Pt's LDL 52  -C/w Crestor 20mg daily and Zetia 10 mg as put pt  - On atorvastatin 40 milliGRAM(s) Oral at bedtime while inpatient
- insulin regimen, home lantus 2u qhs, 5 u tid AC  - mod aiss, fs tif AC  - endocrine consulted as patient is type 1 dm
- endocrine consulted as patient is type 1 dm  - adjustments per endo recs.  - monitor fs.
- insulin regimen, home lantus 2u qhs, 5 u tid AC  - mod aiss, fs tif AC  - endocrine c/s as patient is type 1 dm
- endocrine consulted as patient is type 1 dm  - adjustments per endo recs.  - monitor fs.
rx as needed.

## 2021-12-30 NOTE — PROGRESS NOTE ADULT - PROBLEM SELECTOR PLAN 1
-Test BG q6h while NPO. Once able to take POs please check ac/hs and 2am  -C/w Lantus 3 units at bedtime for now. DO NOT HOLD LANTUS WHEN NPO.  -Continue Admelog 5 units TID pre-meal. HOLD IF NPO. PLEASE ADMINISTER WHENEVER PT EATS!!  -C/w low Admelog correction scale q6h while NPO and then ac meals once back on POs.   -Restart low Admelog correction scale at bedtime when eating  -Use low correction scale q6h when NPO  -PLEASE ADMINISTER ADMELOG CORRECTION SCALES REGARDLESS OF PO STATUS.   -Restart Carbohydrate consistent diet after procedure if ok with GI team  -RD consult for carb counting teaching.  Discharge plan:  -Likely to discharge patient home on basal/bolus insulin. Final regimen pending clinical course.  -Recommend outpatient dilated eye exam   -Will provide pt with Freestyle sho 2 sample and prescription so pt can use  the alarms to detect hypo/hyperglycemia and adjust insulin accordingly.  -Pt to f/u with endo Dr Mc in [place]. MD to refer pt to RD or CDE for carb counting education. Pt to discuss this with endocrinologist.
-test BG AC/HS/2AM  -c/w Lantus 3 units QHS  -Admelog 5 units AC meals for now. If BG above goal on full diet today will increase to home dose of 7 units AC meals  -c/w Admelog low correction scale AC and Low HS Scale  -cons carb diet  Discharge plan:  No objection to dc today if tolerating POs and BG acceptable.   Basaglar 3 units QHS  Novolog 7 units w/meals if eating full meals.   -Pt to f/u with endo Dr Mc in [place]. MD to refer pt to RD or CDE for carb counting education. Pt to discuss this with endocrinologist.
With epigastric abdominal pain   - CT abd pelvis with evidence of Choledocholithiasis with 0.4 cm stone at the level of the pancreatic head within the main bile duct with resulting mild intrahepatic biliary ductal dilation  lipase mildly elevated at 70 but no clinical signs or symptoms of pancreatitis  GI consulted, plan for ERCP this week, timing to be determined   MRCP, ordered  RUQ sono with multiple gallstones no gallbladder wall thickening, may need eventual surgery consultation for lap west
With epigastric abdominal pain   - CT abd pelvis with evidence of Choledocholithiasis with 0.4 cm stone at the level of the pancreatic head within the main bile duct with resulting mild intrahepatic biliary ductal dilation  lipase mildly elevated at 70; no ct evidence of pancreatitis; low suspicion.   GI consulted   MRCP noted.   ERCP done on 12/29.   Gen surg consult for cholelithiasis evaluation and management. Outpt f/u.
With epigastric abdominal pain   - CT abd pelvis with evidence of Choledocholithiasis with 0.4 cm stone at the level of the pancreatic head within the main bile duct with resulting mild intrahepatic biliary ductal dilation  lipase mildly elevated at 70; no ct evidence of pancreatitis; low suspicion.   GI consulted   MRCP noted.   ERCP done on 12/29.   Gen surg consult for cholelithiasis evaluation and management.
-Test BG q6h while NPO. Once able to take POs please check ac/hs and 2am  -C/w Lantus 3 units at bedtime for now. DO NOT HOLD LANTUS WHEN NPO.  -Continue Admelog 5 units TID pre-meal. HOLD IF NPO. PLEASE ADMINISTER WHENEVER PT EATS!!  -C/w low Admelog correction scale q6h while NPO and then ac meals once back on POs.   -Restart low Admelog correction scale at bedtime when eating  -Use low correction scale q6h when NPO  -PLEASE ADMINISTER ADMELOG CORRECTION SCALES REGARDLESS OF PO STATUS.   -RD consult for carb counting teaching.  Discharge plan:  -Likely to discharge patient home on basal/bolus insulin. Final regimen pending clinical course.  -Recommend outpatient dilated eye exam   -Gave pt  Freestyle sho 2 sample so pt can use  the alarms to detect hypo/hyperglycemia and adjust insulin accordingly. Needs Rx at time of discharge  -Pt to f/u with endo Dr Mc in [place]. MD to refer pt to RD or THAE for carb counting education. Pt to discuss this with endocrinologist.
epigastric abdominal pain   CT abd pelvis with evidence of Choledocholithiasis with 0.4 cm stone at the level of the pancreatic head within the main bile duct with resulting mild intrahepatic biliary ductal   dilation  lipase mildly elevated at 70 but no clinical signs or symptoms of pancreatitis  GI c/s  rec MRCP, ordered  RUQ sono with multiple gallstones no gallbladder wall thickening, may need eventual surgery consultation for lap west
-Test BG q6h while NPO. Once able to take POs please check ac/hs and 2am  -C/w Lantus 3 units at bedtime for now. DO NOT HOLD LANTUS WHEN NPO.  -Continue Admelog 5 units TID pre-meal. HOLD IF NPO.  -C/w low Admelog correction scale q6h while NPO and then ac meals once back on POs.   -Restart low Admelog correction scale at bedtime when eating  -Use low correction scale q6h when NPO  -PLEASE ADMINISTER ADMELOG CORRECTION SCALES REGARDLESS OF PO STATUS. Spoke to pt's RN   -Restart Carbohydrate consistent diet if ok with GI team  -RD consult for carb counting teaching.  Discharge plan:  -Likely to discharge patient home on basal/bolus insulin. Final regimen pending clinical course.  -Recommend outpatient dilated eye exam and endocrinology f/u outpatient  -Will provide pt with Freestyle sho 2 sample and prescription so pt can use  the alarms to detect hypo/hyperglycemia and adjust insulin accordingly.  -Pt to f/u with endo Dr Mc in [place]. MD to refer pt to RD or THAE for carb counting education. Pt to discuss this with endocrinologist.
With epigastric abdominal pain   - CT abd pelvis with evidence of Choledocholithiasis with 0.4 cm stone at the level of the pancreatic head within the main bile duct with resulting mild intrahepatic biliary ductal dilation  lipase mildly elevated at 70; no ct evidence of pancreatitis; low suspicion.   GI consulted, plan for ERCP this week.  MRCP, ordered  RUQ sono with multiple gallstones; no west; again, not pancreatitis; outpt f/u
With epigastric abdominal pain   - CT abd pelvis with evidence of Choledocholithiasis with 0.4 cm stone at the level of the pancreatic head within the main bile duct with resulting mild intrahepatic biliary ductal dilation  lipase mildly elevated at 70; no ct evidence of pancreatitis; low suspicion.   GI consulted   MRCP noted.   ERCP for 12/28.   RUQ sono with multiple gallstones; no west; again, not pancreatitis; outpt f/u

## 2021-12-30 NOTE — PROGRESS NOTE ADULT - PROBLEM SELECTOR PLAN 4
- endocrine consulted as patient is type 1 dm  - adjustments per endo recs.  - monitor fs  - ag is closed. Endo f/u appreciated  - hypoglycemia related to small meal intake; patient was concerned given constipation; patient asked to eat regular sized meals if taking insulin.
- c/w home vimpat and keppra
- endocrine consulted as patient is type 1 dm  - adjustments per endo recs.  - monitor fs  - ag is closed. Endo f/u pending.

## 2021-12-30 NOTE — PROGRESS NOTE ADULT - PROBLEM SELECTOR PROBLEM 5
Need for prophylactic measure
Epilepsy
Need for prophylactic measure
Epilepsy

## 2021-12-30 NOTE — PROGRESS NOTE ADULT - NSPROGADDITIONALINFOA_GEN_ALL_CORE
-Plan discussed with pt/team.  Contact info: [phone] (24/7). pager [phone]  [email or web address] password [email or web address]  Spent over 35 minutes providing face to face education which was more than 50% of encounter that also included assessing pt/labs/meds and discussing plan of care with primary team.  Adjusting insulin  Discharge plan  Follow up care
-Plan discussed with pt/team.  Contact info: [phone] (24/7). pager [phone]  "[email or web address][email or web address] password Chip  Spent over 30 minutes providing face to face education which was more than 50% of encounter that also included assessing pt/labs/meds and discussing plan of care with primary team.  Adjusting insulin as needed  Discharge plan  Follow up care
27 minutes spent on the development of plan of care/coordination of care/glycemic control through review of labs, blood glucose values and vital signs.
-Plan discussed with pt/team.  Contact info: [phone] (24/7). pager [phone]  [email or web address] password [email or web address]  Spent over 30 minutes providing face to face education which was more than 50% of encounter that also included assessing pt/labs/meds and discussing plan of care with primary team.  Adjusting insulin as needed  Discharge plan  Follow up care

## 2021-12-30 NOTE — PROGRESS NOTE ADULT - PROBLEM SELECTOR PROBLEM 3
HLD (hyperlipidemia)
Type 1 diabetes mellitus
Type 1 diabetes mellitus
HLD (hyperlipidemia)
HLD (hyperlipidemia)
Type 1 diabetes mellitus
HLD (hyperlipidemia)
HLD (hyperlipidemia)
Constipation
Constipation
Type 1 diabetes mellitus

## 2021-12-30 NOTE — PROGRESS NOTE ADULT - PROBLEM SELECTOR PLAN 2
Patient appear euvolemic at this time.  isotonic crystalloid did not produce sustained improvement.  patient reports drinking "lots of water"  urine osmolality relatively low.   primary polydipsia?  favor FWR to 1L today.   optimize sugars for hypertonic component.

## 2021-12-31 ENCOUNTER — TRANSCRIPTION ENCOUNTER (OUTPATIENT)
Age: 66
End: 2021-12-31

## 2021-12-31 VITALS
DIASTOLIC BLOOD PRESSURE: 70 MMHG | TEMPERATURE: 98 F | SYSTOLIC BLOOD PRESSURE: 120 MMHG | OXYGEN SATURATION: 100 % | HEART RATE: 80 BPM | RESPIRATION RATE: 18 BRPM

## 2021-12-31 LAB
ANION GAP SERPL CALC-SCNC: 12 MMOL/L — SIGNIFICANT CHANGE UP (ref 5–17)
BUN SERPL-MCNC: 9 MG/DL — SIGNIFICANT CHANGE UP (ref 7–23)
CALCIUM SERPL-MCNC: 8.7 MG/DL — SIGNIFICANT CHANGE UP (ref 8.4–10.5)
CHLORIDE SERPL-SCNC: 101 MMOL/L — SIGNIFICANT CHANGE UP (ref 96–108)
CO2 SERPL-SCNC: 20 MMOL/L — LOW (ref 22–31)
CREAT SERPL-MCNC: 0.56 MG/DL — SIGNIFICANT CHANGE UP (ref 0.5–1.3)
GLUCOSE BLDC GLUCOMTR-MCNC: 243 MG/DL — HIGH (ref 70–99)
GLUCOSE BLDC GLUCOMTR-MCNC: 296 MG/DL — HIGH (ref 70–99)
GLUCOSE SERPL-MCNC: 249 MG/DL — HIGH (ref 70–99)
HCT VFR BLD CALC: 33.2 % — LOW (ref 34.5–45)
HGB BLD-MCNC: 11.2 G/DL — LOW (ref 11.5–15.5)
MCHC RBC-ENTMCNC: 29.2 PG — SIGNIFICANT CHANGE UP (ref 27–34)
MCHC RBC-ENTMCNC: 33.7 GM/DL — SIGNIFICANT CHANGE UP (ref 32–36)
MCV RBC AUTO: 86.7 FL — SIGNIFICANT CHANGE UP (ref 80–100)
NRBC # BLD: 0 /100 WBCS — SIGNIFICANT CHANGE UP (ref 0–0)
PLATELET # BLD AUTO: 132 K/UL — LOW (ref 150–400)
POTASSIUM SERPL-MCNC: 4.7 MMOL/L — SIGNIFICANT CHANGE UP (ref 3.5–5.3)
POTASSIUM SERPL-SCNC: 4.7 MMOL/L — SIGNIFICANT CHANGE UP (ref 3.5–5.3)
RBC # BLD: 3.83 M/UL — SIGNIFICANT CHANGE UP (ref 3.8–5.2)
RBC # FLD: 13 % — SIGNIFICANT CHANGE UP (ref 10.3–14.5)
SARS-COV-2 RNA SPEC QL NAA+PROBE: SIGNIFICANT CHANGE UP
SODIUM SERPL-SCNC: 133 MMOL/L — LOW (ref 135–145)
WBC # BLD: 3.94 K/UL — SIGNIFICANT CHANGE UP (ref 3.8–10.5)
WBC # FLD AUTO: 3.94 K/UL — SIGNIFICANT CHANGE UP (ref 3.8–10.5)

## 2021-12-31 PROCEDURE — 99239 HOSP IP/OBS DSCHRG MGMT >30: CPT

## 2021-12-31 PROCEDURE — 85730 THROMBOPLASTIN TIME PARTIAL: CPT

## 2021-12-31 PROCEDURE — C1769: CPT

## 2021-12-31 PROCEDURE — 84295 ASSAY OF SERUM SODIUM: CPT

## 2021-12-31 PROCEDURE — 36415 COLL VENOUS BLD VENIPUNCTURE: CPT

## 2021-12-31 PROCEDURE — 74183 MRI ABD W/O CNTR FLWD CNTR: CPT

## 2021-12-31 PROCEDURE — 71046 X-RAY EXAM CHEST 2 VIEWS: CPT

## 2021-12-31 PROCEDURE — 82435 ASSAY OF BLOOD CHLORIDE: CPT

## 2021-12-31 PROCEDURE — 82962 GLUCOSE BLOOD TEST: CPT

## 2021-12-31 PROCEDURE — 80048 BASIC METABOLIC PNL TOTAL CA: CPT

## 2021-12-31 PROCEDURE — 82803 BLOOD GASES ANY COMBINATION: CPT

## 2021-12-31 PROCEDURE — U0003: CPT

## 2021-12-31 PROCEDURE — 85014 HEMATOCRIT: CPT

## 2021-12-31 PROCEDURE — 96361 HYDRATE IV INFUSION ADD-ON: CPT

## 2021-12-31 PROCEDURE — 80053 COMPREHEN METABOLIC PANEL: CPT

## 2021-12-31 PROCEDURE — G1004: CPT

## 2021-12-31 PROCEDURE — 84443 ASSAY THYROID STIM HORMONE: CPT

## 2021-12-31 PROCEDURE — 87040 BLOOD CULTURE FOR BACTERIA: CPT

## 2021-12-31 PROCEDURE — 83735 ASSAY OF MAGNESIUM: CPT

## 2021-12-31 PROCEDURE — 84300 ASSAY OF URINE SODIUM: CPT

## 2021-12-31 PROCEDURE — 99285 EMERGENCY DEPT VISIT HI MDM: CPT

## 2021-12-31 PROCEDURE — 96375 TX/PRO/DX INJ NEW DRUG ADDON: CPT

## 2021-12-31 PROCEDURE — 83935 ASSAY OF URINE OSMOLALITY: CPT

## 2021-12-31 PROCEDURE — 74177 CT ABD & PELVIS W/CONTRAST: CPT | Mod: MG

## 2021-12-31 PROCEDURE — 87086 URINE CULTURE/COLONY COUNT: CPT

## 2021-12-31 PROCEDURE — 84132 ASSAY OF SERUM POTASSIUM: CPT

## 2021-12-31 PROCEDURE — 86803 HEPATITIS C AB TEST: CPT

## 2021-12-31 PROCEDURE — 83036 HEMOGLOBIN GLYCOSYLATED A1C: CPT

## 2021-12-31 PROCEDURE — 96374 THER/PROPH/DIAG INJ IV PUSH: CPT

## 2021-12-31 PROCEDURE — 74330 X-RAY BILE/PANC ENDOSCOPY: CPT

## 2021-12-31 PROCEDURE — U0005: CPT

## 2021-12-31 PROCEDURE — 83690 ASSAY OF LIPASE: CPT

## 2021-12-31 PROCEDURE — 76705 ECHO EXAM OF ABDOMEN: CPT

## 2021-12-31 PROCEDURE — A9585: CPT

## 2021-12-31 PROCEDURE — 81003 URINALYSIS AUTO W/O SCOPE: CPT

## 2021-12-31 PROCEDURE — 82330 ASSAY OF CALCIUM: CPT

## 2021-12-31 PROCEDURE — 84484 ASSAY OF TROPONIN QUANT: CPT

## 2021-12-31 PROCEDURE — 93005 ELECTROCARDIOGRAM TRACING: CPT

## 2021-12-31 PROCEDURE — 83605 ASSAY OF LACTIC ACID: CPT

## 2021-12-31 PROCEDURE — 85027 COMPLETE CBC AUTOMATED: CPT

## 2021-12-31 PROCEDURE — 84100 ASSAY OF PHOSPHORUS: CPT

## 2021-12-31 PROCEDURE — 82010 KETONE BODYS QUAN: CPT

## 2021-12-31 PROCEDURE — C9399: CPT

## 2021-12-31 PROCEDURE — 80061 LIPID PANEL: CPT

## 2021-12-31 PROCEDURE — 82947 ASSAY GLUCOSE BLOOD QUANT: CPT

## 2021-12-31 PROCEDURE — 85018 HEMOGLOBIN: CPT

## 2021-12-31 PROCEDURE — 85610 PROTHROMBIN TIME: CPT

## 2021-12-31 PROCEDURE — 85025 COMPLETE CBC W/AUTO DIFF WBC: CPT

## 2021-12-31 RX ORDER — SUCRALFATE 1 G
1 TABLET ORAL
Qty: 60 | Refills: 0
Start: 2021-12-31 | End: 2022-01-29

## 2021-12-31 RX ORDER — INSULIN LISPRO 100/ML
7 VIAL (ML) SUBCUTANEOUS
Refills: 0 | Status: DISCONTINUED | OUTPATIENT
Start: 2021-12-31 | End: 2021-12-31

## 2021-12-31 RX ORDER — BNT162B2 0.23 MG/2.25ML
0.3 INJECTION, SUSPENSION INTRAMUSCULAR ONCE
Refills: 0 | Status: COMPLETED | OUTPATIENT
Start: 2021-12-31 | End: 2021-12-31

## 2021-12-31 RX ORDER — INSULIN GLARGINE 100 [IU]/ML
8 INJECTION, SOLUTION SUBCUTANEOUS AT BEDTIME
Refills: 0 | Status: DISCONTINUED | OUTPATIENT
Start: 2021-12-31 | End: 2021-12-31

## 2021-12-31 RX ADMIN — Medication 5 UNIT(S): at 13:09

## 2021-12-31 RX ADMIN — Medication 1 GRAM(S): at 05:20

## 2021-12-31 RX ADMIN — BNT162B2 0.3 MILLILITER(S): 0.23 INJECTION, SUSPENSION INTRAMUSCULAR at 14:42

## 2021-12-31 RX ADMIN — Medication 2: at 09:15

## 2021-12-31 RX ADMIN — Medication 5 UNIT(S): at 09:15

## 2021-12-31 RX ADMIN — LACOSAMIDE 50 MILLIGRAM(S): 50 TABLET ORAL at 11:27

## 2021-12-31 RX ADMIN — Medication 1 TABLET(S): at 11:09

## 2021-12-31 RX ADMIN — LEVETIRACETAM 500 MILLIGRAM(S): 250 TABLET, FILM COATED ORAL at 05:20

## 2021-12-31 RX ADMIN — HEPARIN SODIUM 5000 UNIT(S): 5000 INJECTION INTRAVENOUS; SUBCUTANEOUS at 05:20

## 2021-12-31 RX ADMIN — PANTOPRAZOLE SODIUM 40 MILLIGRAM(S): 20 TABLET, DELAYED RELEASE ORAL at 05:20

## 2021-12-31 RX ADMIN — Medication 3: at 13:10

## 2021-12-31 NOTE — DISCHARGE NOTE PROVIDER - NSDCCPCAREPLAN_GEN_ALL_CORE_FT
PRINCIPAL DISCHARGE DIAGNOSIS  Diagnosis: Choledocholithiasis  Assessment and Plan of Treatment: You      SECONDARY DISCHARGE DIAGNOSES  Diagnosis: Type 1 diabetes mellitus  Assessment and Plan of Treatment:      PRINCIPAL DISCHARGE DIAGNOSIS  Diagnosis: Choledocholithiasis  Assessment and Plan of Treatment: ERCP - Choledocholithiasis was found. Complete removal was accomplished.        SECONDARY DISCHARGE DIAGNOSES  Diagnosis: Type 1 diabetes mellitus  Assessment and Plan of Treatment: HgA1C this admission, 8.5%.  Make sure you get your HgA1c checked every three months.  If you take insulin, check your blood glucose before meals and at bedtime.  It's important not to skip any meals.  Keep a log of your blood glucose results and always take it with you to your doctor appointments.  Keep a list of your current medications including injectables and over the counter medications and bring this medication list with you to all your doctor appointments.  If you have not seen your opthalmologist this year call for appointment.  Check your feet daily for redness, sores, or openings. Do not self treat. If no improvement in two days call your primary care physician for an appointment.  Low blood sugar (hypoglycemia) is a blood sugar below 70mg/dl. Check your blood sugar if you feel signs/symptoms of hypoglycemia. If your blood sugar is below 70 take 15 grams of carbohydrates (ex 4 oz of apple juice, 3-4 glucosr tablets, or 4-6 oz of regular soda) wait 15 minutes and repeat blood sugar to make sure it comes up above 70.  If your blood sugar is above 70 and you are due for a meal, have a meal.  If you are not due for a meal have a snack.  This snack helps keeps your blood sugar at a safe range.      Diagnosis: HTN (hypertension)  Assessment and Plan of Treatment: Low salt diet  Activity as tolerated.  Take all medication as prescribed.  Follow up with your medical doctor for routine blood pressure monitoring at your next visit.  Notify your doctor if you have any of the following symptoms:   Dizziness, Lightheadedness, Blurry vision, Headache, Chest pain, Shortness of breath]      Diagnosis: HLD (hyperlipidemia)  Assessment and Plan of Treatment: Continue to take your medications as prescribed by your doctor.

## 2021-12-31 NOTE — CHART NOTE - NSCHARTNOTEFT_GEN_A_CORE
Chart reviewed     66F w/h/o uncontrolled T1DM on basal/bolus insulin. No known DM complications. Has not seen ophthalmologist in the last year. Also h/o HTN, epilepsy on keppra/vimpat, vitiligo, chronic palpitations. Here with 6 days of  constipation, lightheadedness, burning epigastric pain. Appears to be due to acute choledocholithiasis. Endocrine consulted for T1DM management. Hypoglycemic yesterday after receiving mealtime insulin at dinner w/low carb/PO consumption. Diet advanced today, tolerating. Will assess requirements while on full diet. BG goal (100-180mg/dl).     Uncontrolled type 1 diabetes mellitus with hyperglycemia, with long-term current use of insulin.     - received 25 U of insulin in last 24 hours   - Increase Lantus to 8 U HS   - Increase Lispro to 7 U TID with meals; hold if not eating or NPO    - test BG AC/HS/2AM  - c/w Admelog low correction scale AC and Low HS Scale  - cons carb diet

## 2021-12-31 NOTE — DISCHARGE NOTE PROVIDER - HOSPITAL COURSE
66F c hx HTN, DM1, epilepsy on keppra/vimpat, vitilogo, chronic palpitations, pw constipation, poss gallstone pancreatitis, and hyperglycemia       Problem/Plan - 1:  ·  Problem: Choledocholithiasis.   ·  Plan: With epigastric abdominal pain   - CT abd pelvis with evidence of Choledocholithiasis with 0.4 cm stone at the level of the pancreatic head within the main bile duct with resulting mild intrahepatic biliary ductal dilation  lipase mildly elevated at 70; no ct evidence of pancreatitis; low suspicion.   GI consulted   MRCP noted.   ERCP done on 12/29.   Gen surg consult for cholelithiasis evaluation and management. Outpt f/u.     Problem/Plan - 2:  ·  Problem: Hyponatremia.   ·  Plan: Patient appear euvolemic at this time.  isotonic crystalloid did not produce sustained improvement.  patient reports drinking "lots of water"  urine osmolality relatively low.   primary polydipsia?  favor FWR to 1L today.   optimize sugars for hypertonic component.     Problem/Plan - 3:  ·  Problem: Constipation.   ·  Plan: rx as needed.     Problem/Plan - 4:  ·  Problem: Type 1 diabetes mellitus.   ·  Plan: - endocrine consulted as patient is type 1 dm  - adjustments per endo recs.  - monitor fs  - ag is closed. Endo f/u appreciated  - hypoglycemia related to small meal intake; patient was concerned given constipation; patient asked to eat regular sized meals if taking insulin.     Problem/Plan - 5:  ·  Problem: Epilepsy.   ·  Plan: - c/w home vimpat and keppra.     Problem/Plan - 6:  ·  Problem: Need for prophylactic measure.   ·  Plan: ambulatory     PPX  HSQ.   66F c hx HTN, DM1, epilepsy on keppra/vimpat, vitilogo, chronic palpitations, pw constipation, poss gallstone pancreatitis, and hyperglycemia       Problem/Plan - 1:  ·  Problem: Choledocholithiasis.   ·  Plan: With epigastric abdominal pain   - CT abd pelvis with evidence of Choledocholithiasis with 0.4 cm stone at the level of the pancreatic head within the main bile duct with resulting mild intrahepatic biliary ductal dilation  lipase mildly elevated at 70; no ct evidence of pancreatitis; low suspicion.   GI consulted   MRCP noted.   ERCP done on 12/29. - Choledocholithiasis was found. Complete removal was accomplished by                        sphincterotomy and balloon extraction.  Gen surg consult for cholelithiasis evaluation and management. Outpt f/u.     Problem/Plan - 2:  ·  Problem: Hyponatremia.   ·  Plan: Patient appear euvolemic at this time.  isotonic crystalloid did not produce sustained improvement.  patient reports drinking "lots of water"  urine osmolality relatively low.   primary polydipsia?  favor FWR to 1L today.   optimize sugars for hypertonic component.     Problem/Plan - 3:  ·  Problem: Constipation.   ·  Plan: rx as needed.     Problem/Plan - 4:  ·  Problem: Type 1 diabetes mellitus.   ·  Plan: - endocrine consulted as patient is type 1 dm  - adjustments per endo recs.  - monitor fs  - ag is closed. Endo f/u appreciated  - hypoglycemia related to small meal intake; patient was concerned given constipation; patient asked to eat regular sized meals if taking insulin.     Problem/Plan - 5:  ·  Problem: Epilepsy.   ·  Plan: - c/w home vimpat and keppra.     Problem/Plan - 6:  ·  Problem: Need for prophylactic measure.   ·  Plan: ambulatory     DCP and medication reconciliation discussed with Dr Prather and in agreement. Patient is hemodynamically stable and cleared for discharge. Patient will follow up with PMD, Endocrine, and Surgery. Patient received Pfiezer booster prior to discharge   66F c hx HTN, DM1, epilepsy on keppra/vimpat, vitilogo, chronic palpitations, pw constipation, poss gallstone pancreatitis, and hyperglycemia       Problem/Plan - 1:  ·  Problem: Choledocholithiasis.   ·  Plan: With epigastric abdominal pain   - CT abd pelvis with evidence of Choledocholithiasis with 0.4 cm stone at the level of the pancreatic head within the main bile duct with resulting mild intrahepatic biliary ductal dilation  lipase mildly elevated at 70; no ct evidence of pancreatitis; low suspicion.   GI consulted   MRCP noted.   ERCP done on 12/29. - Choledocholithiasis was found. Complete removal was accomplished by                        sphincterotomy and balloon extraction.  Gen surg consult for cholelithiasis evaluation and management. Outpt f/u.     Problem/Plan - 2:  ·  Problem: Hyponatremia.   ·  Plan: Patient appear euvolemic at this time.  isotonic crystalloid did not produce sustained improvement.  patient reports drinking "lots of water"  urine osmolality relatively low.   primary polydipsia?  favor FWR to 1L today.   optimize sugars for hypertonic component.     Problem/Plan - 3:  ·  Problem: Constipation.   ·  Plan: rx as needed.     Problem/Plan - 4:  ·  Problem: Type 1 diabetes mellitus.   ·  Plan: - endocrine consulted as patient is type 1 dm  - adjustments per endo recs.  - monitor fs  - ag is closed. Endo f/u appreciated  - hypoglycemia related to small meal intake; patient was concerned given constipation; patient asked to eat regular sized meals if taking insulin.     Problem/Plan - 5:  ·  Problem: Epilepsy.   ·  Plan: - c/w home vimpat and keppra.     Problem/Plan - 6:  ·  Problem: Need for prophylactic measure.   ·  Plan: ambulatory     DCP and medication reconciliation discussed with Dr Prather and in agreement. Patient is hemodynamically stable and cleared for discharge. Patient will follow up with PMD, Endocrine, and Surgery. Patient received Pfiezer booster prior to discharge    Attending attestation  Please see chart note from today.  Optimized to continue care outpt.   DC TIME SPENT:  50 minutes.

## 2021-12-31 NOTE — DISCHARGE NOTE NURSING/CASE MANAGEMENT/SOCIAL WORK - NSDCVIVACCINE_GEN_ALL_CORE_FT
No Vaccines Administered. COVID-19, mRNA, LNP-S, PF, 30 mcg/0.3 mL dose (Pfizer); 31-Dec-2021 14:42; Cindy Zuleta (UMAIR); [employer]; [id number] (Exp. Date: 21-Jan-2022); IntraMuscular; Deltoid Right.; 0.3 milliLiter(s);

## 2021-12-31 NOTE — DISCHARGE NOTE PROVIDER - PROVIDER TOKENS
PROVIDER:[TOKEN:[[id number]],FOLLOWUP:[2 weeks]],PROVIDER:[TOKEN:[[id number]],FOLLOWUP:[1 week]],PROVIDER:[TOKEN:[[id number]],FOLLOWUP:[2 weeks]]

## 2021-12-31 NOTE — DISCHARGE NOTE NURSING/CASE MANAGEMENT/SOCIAL WORK - NSDCPEFALRISK_GEN_ALL_CORE
For information on Fall & Injury Prevention, visit: [email or web address] OR  [email or web address] OR  https://www.cdc.gov/steadi/patient.html

## 2021-12-31 NOTE — DISCHARGE NOTE PROVIDER - DETAILS OF MALNUTRITION DIAGNOSIS/DIAGNOSES
This patient has been assessed with a concern for Malnutrition and was treated during this hospitalization for the following Nutrition diagnosis/diagnoses:     -  12/27/2021: Moderate protein-calorie malnutrition   -  12/27/2021: Underweight (BMI < 19)

## 2021-12-31 NOTE — DISCHARGE NOTE PROVIDER - NSDCMRMEDTOKEN_GEN_ALL_CORE_FT
Basaglar KwikPen 100 units/mL subcutaneous solution: 3 unit(s) subcutaneous once a day (at bedtime)  Crestor 20 mg oral tablet: 1 tab(s) orally once a day  Keppra 500 mg oral tablet: 1 tab(s) orally 2 times a day  Multiple Vitamins oral tablet: 1 tab(s) orally once a day  NovoLOG 100 units/mL injectable solution: 7 unit(s) injectable 3 times a day  omeprazole 20 mg oral delayed release capsule: 1 cap(s) orally once a day  sucralfate 1 g oral tablet: 1 tab(s) orally 2 times a day  Vimpat 50 mg oral tablet: 1 tab(s) orally 2 times a day  Zetia 10 mg oral tablet: 1 tab(s) orally once a day   Basaglar KwikPen 100 units/mL subcutaneous solution: 3 unit(s) subcutaneous once a day (at bedtime)  Crestor 20 mg oral tablet: 1 tab(s) orally once a day  Keppra 500 mg oral tablet: 1 tab(s) orally 2 times a day  Multiple Vitamins oral tablet: 1 tab(s) orally once a day  NovoLOG 100 units/mL injectable solution: 7 unit(s) injectable 3 times a day  omeprazole 20 mg oral delayed release capsule: 1 cap(s) orally once a day  sucralfate 1 g oral tablet: 1 tab(s) orally 2 times a day  Vimpat 50 mg oral tablet: 1 tab(s) orally 2 times a day

## 2021-12-31 NOTE — CHART NOTE - NSCHARTNOTEFT_GEN_A_CORE
Patient seen and examined at bedside.  No acute complaints.  12 pt ros negative.    T(C): 36.2 (12-31-21 @ 11:30), Max: 36.6 (12-31-21 @ 04:30)  T(F): 97.2 (12-31-21 @ 11:30), Max: 97.9 (12-31-21 @ 04:30)  HR: 75 (12-31-21 @ 11:30) (75 - 90)  BP: 121/71 (12-31-21 @ 11:30) (105/60 - 121/71)  ABP: --  ABP(mean): --  RR: 18 (12-31-21 @ 11:30) (18 - 18)  SpO2: 100% (12-31-21 @ 11:30) (95% - 100%)      CONSTITUTIONAL: No acute distress.   HEENT:  Conjunctiva clear B/L.  Moist oral mucosa.   Cardiovascular: RRR with no murmurs. No JVD noted. No lower extremity edema B/L. Extremities are warm and well perfused. Radial pulses 2+ B/L. Dorsalis pedis pulses 2+ B/L.    Respiratory: Lungs CTAB. No wrr. No accessory muscle use.   Gastrointestinal:  Soft, nontender. Non-distended. Non-rigid. No CVA tenderness B/L.  MSK:  No joint swelling. No joint erythema B/L. No midline spinal tenderness.  Neurologic:  Alert and awake. Oriented x3. Moving all extremities. Following commands. Making eye contact.    Psych:  Normal affect. Normal Mood.     Labs and imaging reviewed.    Problem/Plan - 1:  ·  Problem: Choledocholithiasis.   ·  Plan: With epigastric abdominal pain   - CT abd pelvis with evidence of Choledocholithiasis with 0.4 cm stone at the level of the pancreatic head within the main bile duct with resulting mild intrahepatic biliary ductal dilation  lipase mildly elevated at 70; no ct evidence of pancreatitis; low suspicion.   GI consulted   MRCP noted.   ERCP done on 12/29.   Gen surg consult for cholelithiasis evaluation and management. Outpt f/u.     Problem/Plan - 2:  ·  Problem: Hyponatremia.   ·  Plan: Patient appear euvolemic at this time.  isotonic crystalloid did not produce sustained improvement.  patient reports drinking "lots of water"  urine osmolality relatively low.   primary polydipsia?  improved w/ FWR to 1L; c/w this outpt.         Problem/Plan - 3:  ·  Problem: Constipation.   ·  Plan: rx as needed.     Problem/Plan - 4:  ·  Problem: Type 1 diabetes mellitus.   ·  Plan: - endocrine consulted as patient is type 1 dm  - adjustments per endo recs.  - monitor fs  - ag is closed. Endo final recs appreciated.         Problem/Plan - 5:  ·  Problem: Epilepsy.   ·  Plan: - c/w home vimpat and keppra.     Problem/Plan - 6:  ·  Problem: Need for prophylactic measure.   ·  Plan: ambulatory     PPX  HSQ.    Patient cleared by all services for d/c.  Patient is medically optimized for d/c.

## 2021-12-31 NOTE — DISCHARGE NOTE PROVIDER - NSDCFUADDAPPT_GEN_ALL_CORE_FT
FOLLOW UP WITH GENERAL SURGERY FOR POSSIBLE GALLBLADDER REMOVAL WHICH CAN PREVENT COMPLICATIONS IN THE FUTURE.

## 2021-12-31 NOTE — DISCHARGE NOTE PROVIDER - CARE PROVIDER_API CALL
Michel Mike  INTERNAL MEDICINE  [address] [place] [address]  Phone: [phone]  Fax: [phone]  Follow Up Time: 2 weeks    Marcos Mc  INTERNAL MEDICINE  [address] [place] [address]  Phone: [phone]  Fax: [phone]  Follow Up Time: 1 week    Lashay Winston)  Surgery  [address] [place] [address] [address]  [place] [address]  Phone: [phone]  Fax: [phone]  Follow Up Time: 2 weeks

## 2021-12-31 NOTE — DISCHARGE NOTE NURSING/CASE MANAGEMENT/SOCIAL WORK - PATIENT PORTAL LINK FT
You can access the FollowMyHealth Patient Portal offered by [facility] by registering at the following website: [email or web address]. By joining [facility]’s FollowMyHealth portal, you will also be able to view your health information using other applications (apps) compatible with our system.

## 2022-01-18 NOTE — PROGRESS NOTE ADULT - PROBLEM/PLAN-1
DISPLAY PLAN FREE TEXT
[FreeTextEntry1] : 60-year-old woman with a history of chronic migraines, for followup visit. Reports persistent headaches almost on a daily basis,no clear triggers, moderate to severe intensity, can be heavy, throbbing, pounding,sometimes light-sensitive, symptom nausea.I think using Ubrelvy 100 mg, p.o. p.r.n., with excellent results. Last visit in the office, she tried a sample of Aimovig 70 mg Sq , but not covered by her insurance.
DISPLAY PLAN FREE TEXT

## 2022-01-31 ENCOUNTER — APPOINTMENT (OUTPATIENT)
Dept: TRAUMA SURGERY | Facility: CLINIC | Age: 67
End: 2022-01-31
Payer: COMMERCIAL

## 2022-01-31 DIAGNOSIS — R10.31 RIGHT LOWER QUADRANT PAIN: ICD-10-CM

## 2022-01-31 DIAGNOSIS — K85.10 BILIARY ACUTE PANCREATITIS WITHOUT NECROSIS OR INFECTION: ICD-10-CM

## 2022-01-31 DIAGNOSIS — G89.29 RIGHT LOWER QUADRANT PAIN: ICD-10-CM

## 2022-01-31 PROBLEM — Z00.00 ENCOUNTER FOR PREVENTIVE HEALTH EXAMINATION: Status: ACTIVE | Noted: 2022-01-31

## 2022-01-31 PROCEDURE — 99213 OFFICE O/P EST LOW 20 MIN: CPT

## 2022-02-12 PROBLEM — K85.10 GALLSTONE PANCREATITIS: Status: ACTIVE | Noted: 2022-02-12

## 2022-02-12 PROBLEM — R10.31 ABDOMINAL PAIN, CHRONIC, RIGHT LOWER QUADRANT: Status: ACTIVE | Noted: 2022-02-12

## 2022-04-15 ENCOUNTER — OUTPATIENT (OUTPATIENT)
Dept: OUTPATIENT SERVICES | Facility: HOSPITAL | Age: 67
LOS: 1 days | Discharge: HOME | End: 2022-04-15
Payer: MEDICARE

## 2022-04-15 DIAGNOSIS — R07.89 OTHER CHEST PAIN: ICD-10-CM

## 2022-04-15 LAB
ANION GAP SERPL CALC-SCNC: 16 MMOL/L — HIGH (ref 7–14)
BUN SERPL-MCNC: 12 MG/DL — SIGNIFICANT CHANGE UP (ref 7–23)
CALCIUM SERPL-MCNC: 9.9 MG/DL — SIGNIFICANT CHANGE UP (ref 8.4–10.5)
CHLORIDE SERPL-SCNC: 95 MMOL/L — LOW (ref 98–107)
CO2 SERPL-SCNC: 24 MMOL/L — SIGNIFICANT CHANGE UP (ref 22–31)
CREAT SERPL-MCNC: 0.65 MG/DL — SIGNIFICANT CHANGE UP (ref 0.5–1.3)
EGFR: 97 ML/MIN/1.73M2 — SIGNIFICANT CHANGE UP
GLUCOSE SERPL-MCNC: 317 MG/DL — HIGH (ref 70–99)
HCT VFR BLD CALC: 37.6 % — SIGNIFICANT CHANGE UP (ref 34.5–45)
HGB BLD-MCNC: 12.4 G/DL — SIGNIFICANT CHANGE UP (ref 11.5–15.5)
MCHC RBC-ENTMCNC: 27.9 PG — SIGNIFICANT CHANGE UP (ref 27–34)
MCHC RBC-ENTMCNC: 33 GM/DL — SIGNIFICANT CHANGE UP (ref 32–36)
MCV RBC AUTO: 84.5 FL — SIGNIFICANT CHANGE UP (ref 80–100)
NRBC # BLD: 0 /100 WBCS — SIGNIFICANT CHANGE UP
NRBC # FLD: 0 K/UL — SIGNIFICANT CHANGE UP
PLATELET # BLD AUTO: 158 K/UL — SIGNIFICANT CHANGE UP (ref 150–400)
POTASSIUM SERPL-MCNC: 4.3 MMOL/L — SIGNIFICANT CHANGE UP (ref 3.5–5.3)
POTASSIUM SERPL-SCNC: 4.3 MMOL/L — SIGNIFICANT CHANGE UP (ref 3.5–5.3)
RBC # BLD: 4.45 M/UL — SIGNIFICANT CHANGE UP (ref 3.8–5.2)
RBC # FLD: 13.2 % — SIGNIFICANT CHANGE UP (ref 10.3–14.5)
SODIUM SERPL-SCNC: 135 MMOL/L — SIGNIFICANT CHANGE UP (ref 135–145)
WBC # BLD: 7.91 K/UL — SIGNIFICANT CHANGE UP (ref 3.8–10.5)
WBC # FLD AUTO: 7.91 K/UL — SIGNIFICANT CHANGE UP (ref 3.8–10.5)

## 2022-04-15 PROCEDURE — 93454 CORONARY ARTERY ANGIO S&I: CPT | Mod: 26

## 2022-04-15 PROCEDURE — 93010 ELECTROCARDIOGRAM REPORT: CPT

## 2022-04-15 RX ORDER — DEXTROSE 50 % IN WATER 50 %
25 SYRINGE (ML) INTRAVENOUS ONCE
Refills: 0 | Status: DISCONTINUED | OUTPATIENT
Start: 2022-04-15 | End: 2022-04-29

## 2022-04-15 RX ORDER — GLUCAGON INJECTION, SOLUTION 0.5 MG/.1ML
1 INJECTION, SOLUTION SUBCUTANEOUS ONCE
Refills: 0 | Status: DISCONTINUED | OUTPATIENT
Start: 2022-04-15 | End: 2022-04-29

## 2022-04-15 RX ORDER — SODIUM CHLORIDE 9 MG/ML
3 INJECTION INTRAMUSCULAR; INTRAVENOUS; SUBCUTANEOUS EVERY 8 HOURS
Refills: 0 | Status: DISCONTINUED | OUTPATIENT
Start: 2022-04-15 | End: 2022-04-29

## 2022-04-15 RX ORDER — DEXTROSE 50 % IN WATER 50 %
12.5 SYRINGE (ML) INTRAVENOUS ONCE
Refills: 0 | Status: DISCONTINUED | OUTPATIENT
Start: 2022-04-15 | End: 2022-04-29

## 2022-04-15 RX ORDER — SODIUM CHLORIDE 9 MG/ML
1000 INJECTION, SOLUTION INTRAVENOUS
Refills: 0 | Status: DISCONTINUED | OUTPATIENT
Start: 2022-04-15 | End: 2022-04-29

## 2022-04-15 RX ORDER — DEXTROSE 50 % IN WATER 50 %
15 SYRINGE (ML) INTRAVENOUS ONCE
Refills: 0 | Status: DISCONTINUED | OUTPATIENT
Start: 2022-04-15 | End: 2022-04-29

## 2022-04-15 RX ORDER — INSULIN LISPRO 100/ML
VIAL (ML) SUBCUTANEOUS
Refills: 0 | Status: DISCONTINUED | OUTPATIENT
Start: 2022-04-15 | End: 2022-04-29

## 2022-04-15 RX ADMIN — Medication 4: at 12:41

## 2022-04-15 RX ADMIN — SODIUM CHLORIDE 3 MILLILITER(S): 9 INJECTION INTRAMUSCULAR; INTRAVENOUS; SUBCUTANEOUS at 14:25

## 2022-04-15 RX ADMIN — Medication 2: at 14:25

## 2022-04-15 NOTE — H&P CARDIOLOGY - HISTORY OF PRESENT ILLNESS
67 y/o F w/ PMH of HTN, HLD, DM1, Epilepsy on keppra/vimpat, chronic palpitations and Cholelithiasis presents for cardiac catheretization. Pt states that she has been experiencing intermittent chest pain. Pt describes it as left sided burning that is partially relieved with taking omeprazole. Pt was sent for an exercise stress test with reportedly ischemic ECG changes. Pt was then sent for a NST and there was again ischemic ECG changes but no perfusion defects found on imaging. Cardiac angiogram will be used to r/o CAD has a cause of her chest pain. Pt denies N/V/D, fevers, chills, cough, syncope, dyspnea on exertion, orthopnea, nocturnal paroxysmal dyspnea, edema, cyanosis, heart murmurs, varicosities, phlebitis, claudication.    Cardiologist: Dr. Porras

## 2022-04-15 NOTE — H&P CARDIOLOGY - NSICDXPASTMEDICALHX_GEN_ALL_CORE_FT
PAST MEDICAL HISTORY:  Cholelithiasis     DM (diabetes mellitus) Type 1    Epilepsy     HLD (hyperlipidemia)     HTN (hypertension)

## 2023-09-20 NOTE — H&P ADULT - HISTORY OF PRESENT ILLNESS
PT IS GOING TO [facility] FOR THIS    66F c hx HTN, DM1, epilepsy on keppra/vimpat, vitilogo, chronic palpitations, pw 6 days constipation, lightheadedness, burning epigastric pain.    Pt reports her last seizure was about 2-3 months ago. Pt states she did not take her seizure medications this morning because of current symptoms. Pt's last colonscopy was 11 yrs ago. Never had EGD before.  Pt reports 6 days of no BM, but good appetite without nausea or vomiting. Pt reports burning epigastric pain. Also reports lightheadedness for 6 days. Pt states her blood sugars have been in the 400s. And reports urinary frequency without dysuria or fevers. Pt states she has chronic palpitations and chest pain that are not worse than usual. Pt took tylenol for her abd pain with relief. Pt did not take anything for her constipation.

## 2023-10-04 RX ORDER — INSULIN ASPART 100 [IU]/ML
8 INJECTION, SOLUTION SUBCUTANEOUS
Qty: 0 | Refills: 0 | DISCHARGE

## 2023-10-04 RX ORDER — INSULIN GLARGINE 100 [IU]/ML
3 INJECTION, SOLUTION SUBCUTANEOUS
Qty: 0 | Refills: 0 | DISCHARGE

## 2023-10-04 RX ORDER — INSULIN GLARGINE 100 [IU]/ML
4 INJECTION, SOLUTION SUBCUTANEOUS
Qty: 0 | Refills: 0 | DISCHARGE

## 2023-10-04 RX ORDER — INSULIN GLARGINE 100 [IU]/ML
8 INJECTION, SOLUTION SUBCUTANEOUS
Qty: 0 | Refills: 0 | DISCHARGE

## 2023-10-04 RX ORDER — METOPROLOL TARTRATE 50 MG
1 TABLET ORAL
Qty: 0 | Refills: 0 | DISCHARGE

## 2023-10-04 RX ORDER — INSULIN ASPART 100 [IU]/ML
7 INJECTION, SOLUTION SUBCUTANEOUS
Qty: 0 | Refills: 0 | DISCHARGE

## 2023-10-04 RX ORDER — ROSUVASTATIN CALCIUM 5 MG/1
1 TABLET ORAL
Qty: 0 | Refills: 0 | DISCHARGE

## 2023-10-04 RX ORDER — INSULIN ASPART 100 [IU]/ML
9 INJECTION, SOLUTION SUBCUTANEOUS
Qty: 0 | Refills: 0 | DISCHARGE

## 2023-10-04 RX ORDER — OMEPRAZOLE 10 MG/1
1 CAPSULE, DELAYED RELEASE ORAL
Qty: 0 | Refills: 0 | DISCHARGE

## 2023-10-04 RX ORDER — AZELASTINE HCL 0.05 %
1 DROPS OPHTHALMIC (EYE)
Qty: 0 | Refills: 0 | DISCHARGE

## 2023-10-04 RX ORDER — ALENDRONATE SODIUM 70 MG/1
1 TABLET ORAL
Qty: 0 | Refills: 0 | DISCHARGE

## 2023-10-04 RX ORDER — INSULIN ASPART 100 [IU]/ML
6 INJECTION, SOLUTION SUBCUTANEOUS
Qty: 0 | Refills: 0 | DISCHARGE

## 2023-10-04 RX ORDER — EZETIMIBE 10 MG/1
1 TABLET ORAL
Qty: 0 | Refills: 0 | DISCHARGE

## 2023-10-04 RX ORDER — ASPIRIN/CALCIUM CARB/MAGNESIUM 324 MG
1 TABLET ORAL
Qty: 0 | Refills: 0 | DISCHARGE

## 2023-10-04 RX ORDER — INSULIN GLARGINE 100 [IU]/ML
2 INJECTION, SOLUTION SUBCUTANEOUS
Qty: 0 | Refills: 0 | DISCHARGE

## 2023-10-04 RX ORDER — METOPROLOL TARTRATE 50 MG
0.5 TABLET ORAL
Qty: 0 | Refills: 0 | DISCHARGE

## 2023-10-04 RX ORDER — LEVETIRACETAM 250 MG/1
1 TABLET, FILM COATED ORAL
Qty: 0 | Refills: 0 | DISCHARGE

## 2023-10-04 RX ORDER — LACOSAMIDE 50 MG/1
1 TABLET ORAL
Qty: 0 | Refills: 0 | DISCHARGE

## 2023-12-27 NOTE — H&P CARDIOLOGY - NEGATIVE CARDIOVASCULAR SYMPTOMS
no dyspnea on exertion/no orthopnea/no paroxysmal nocturnal dyspnea/no peripheral edema/no claudication
Name band;

## 2025-05-19 ENCOUNTER — INPATIENT (INPATIENT)
Facility: HOSPITAL | Age: 70
LOS: 8 days | Discharge: HOME HEALTH | DRG: 392 | End: 2025-05-28
Attending: SURGERY | Admitting: INTERNAL MEDICINE
Payer: MEDICARE

## 2025-05-19 VITALS
WEIGHT: 87.96 LBS | HEIGHT: 62 IN | RESPIRATION RATE: 18 BRPM | TEMPERATURE: 98 F | DIASTOLIC BLOOD PRESSURE: 84 MMHG | HEART RATE: 83 BPM | OXYGEN SATURATION: 100 % | SYSTOLIC BLOOD PRESSURE: 156 MMHG

## 2025-05-19 PROBLEM — E11.9 TYPE 2 DIABETES MELLITUS WITHOUT COMPLICATIONS: Chronic | Status: ACTIVE | Noted: 2021-12-24

## 2025-05-19 PROBLEM — G40.909 EPILEPSY, UNSPECIFIED, NOT INTRACTABLE, WITHOUT STATUS EPILEPTICUS: Chronic | Status: ACTIVE | Noted: 2022-04-15

## 2025-05-19 PROBLEM — K80.20 CALCULUS OF GALLBLADDER WITHOUT CHOLECYSTITIS WITHOUT OBSTRUCTION: Chronic | Status: ACTIVE | Noted: 2022-04-15

## 2025-05-19 PROBLEM — E78.5 HYPERLIPIDEMIA, UNSPECIFIED: Chronic | Status: ACTIVE | Noted: 2021-12-24

## 2025-05-19 PROBLEM — I10 ESSENTIAL (PRIMARY) HYPERTENSION: Chronic | Status: ACTIVE | Noted: 2022-04-15

## 2025-05-19 LAB
ALBUMIN SERPL ELPH-MCNC: 4.5 G/DL — SIGNIFICANT CHANGE UP (ref 3.3–5)
ALP SERPL-CCNC: 106 U/L — SIGNIFICANT CHANGE UP (ref 40–120)
ALT FLD-CCNC: 22 U/L — SIGNIFICANT CHANGE UP (ref 10–45)
ANION GAP SERPL CALC-SCNC: 15 MMOL/L — SIGNIFICANT CHANGE UP (ref 5–17)
APPEARANCE UR: CLEAR — SIGNIFICANT CHANGE UP
AST SERPL-CCNC: 25 U/L — SIGNIFICANT CHANGE UP (ref 10–40)
B-OH-BUTYR SERPL-SCNC: 0.2 MMOL/L — SIGNIFICANT CHANGE UP
BASOPHILS # BLD AUTO: 0.06 K/UL — SIGNIFICANT CHANGE UP (ref 0–0.2)
BASOPHILS NFR BLD AUTO: 1 % — SIGNIFICANT CHANGE UP (ref 0–2)
BILIRUB SERPL-MCNC: 0.5 MG/DL — SIGNIFICANT CHANGE UP (ref 0.2–1.2)
BILIRUB UR-MCNC: NEGATIVE — SIGNIFICANT CHANGE UP
BUN SERPL-MCNC: 10 MG/DL — SIGNIFICANT CHANGE UP (ref 7–23)
CALCIUM SERPL-MCNC: 10 MG/DL — SIGNIFICANT CHANGE UP (ref 8.4–10.5)
CHLORIDE SERPL-SCNC: 87 MMOL/L — LOW (ref 96–108)
CO2 SERPL-SCNC: 23 MMOL/L — SIGNIFICANT CHANGE UP (ref 22–31)
COLOR SPEC: YELLOW — SIGNIFICANT CHANGE UP
CREAT SERPL-MCNC: 0.57 MG/DL — SIGNIFICANT CHANGE UP (ref 0.5–1.3)
DIFF PNL FLD: NEGATIVE — SIGNIFICANT CHANGE UP
EGFR: 98 ML/MIN/1.73M2 — SIGNIFICANT CHANGE UP
EGFR: 98 ML/MIN/1.73M2 — SIGNIFICANT CHANGE UP
EOSINOPHIL # BLD AUTO: 0.04 K/UL — SIGNIFICANT CHANGE UP (ref 0–0.5)
EOSINOPHIL NFR BLD AUTO: 0.7 % — SIGNIFICANT CHANGE UP (ref 0–6)
FLUAV AG NPH QL: SIGNIFICANT CHANGE UP
FLUBV AG NPH QL: SIGNIFICANT CHANGE UP
GAS PNL BLDV: SIGNIFICANT CHANGE UP
GLUCOSE BLDC GLUCOMTR-MCNC: 106 MG/DL — HIGH (ref 70–99)
GLUCOSE BLDC GLUCOMTR-MCNC: 205 MG/DL — HIGH (ref 70–99)
GLUCOSE BLDC GLUCOMTR-MCNC: 254 MG/DL — HIGH (ref 70–99)
GLUCOSE SERPL-MCNC: 270 MG/DL — HIGH (ref 70–99)
GLUCOSE UR QL: 500 MG/DL
HCT VFR BLD CALC: 42.4 % — SIGNIFICANT CHANGE UP (ref 34.5–45)
HGB BLD-MCNC: 14.3 G/DL — SIGNIFICANT CHANGE UP (ref 11.5–15.5)
IMM GRANULOCYTES NFR BLD AUTO: 0.5 % — SIGNIFICANT CHANGE UP (ref 0–0.9)
KETONES UR QL: ABNORMAL MG/DL
LEUKOCYTE ESTERASE UR-ACNC: NEGATIVE — SIGNIFICANT CHANGE UP
LIDOCAIN IGE QN: 45 U/L — SIGNIFICANT CHANGE UP (ref 7–60)
LYMPHOCYTES # BLD AUTO: 1.12 K/UL — SIGNIFICANT CHANGE UP (ref 1–3.3)
LYMPHOCYTES # BLD AUTO: 19.1 % — SIGNIFICANT CHANGE UP (ref 13–44)
MCHC RBC-ENTMCNC: 27.6 PG — SIGNIFICANT CHANGE UP (ref 27–34)
MCHC RBC-ENTMCNC: 33.7 G/DL — SIGNIFICANT CHANGE UP (ref 32–36)
MCV RBC AUTO: 81.7 FL — SIGNIFICANT CHANGE UP (ref 80–100)
MONOCYTES # BLD AUTO: 0.43 K/UL — SIGNIFICANT CHANGE UP (ref 0–0.9)
MONOCYTES NFR BLD AUTO: 7.3 % — SIGNIFICANT CHANGE UP (ref 2–14)
NEUTROPHILS # BLD AUTO: 4.19 K/UL — SIGNIFICANT CHANGE UP (ref 1.8–7.4)
NEUTROPHILS NFR BLD AUTO: 71.4 % — SIGNIFICANT CHANGE UP (ref 43–77)
NITRITE UR-MCNC: NEGATIVE — SIGNIFICANT CHANGE UP
NRBC BLD AUTO-RTO: 0 /100 WBCS — SIGNIFICANT CHANGE UP (ref 0–0)
PH UR: 7.5 — SIGNIFICANT CHANGE UP (ref 5–8)
PLATELET # BLD AUTO: 305 K/UL — SIGNIFICANT CHANGE UP (ref 150–400)
POTASSIUM SERPL-MCNC: 4 MMOL/L — SIGNIFICANT CHANGE UP (ref 3.5–5.3)
POTASSIUM SERPL-SCNC: 4 MMOL/L — SIGNIFICANT CHANGE UP (ref 3.5–5.3)
PROT SERPL-MCNC: 7.9 G/DL — SIGNIFICANT CHANGE UP (ref 6–8.3)
PROT UR-MCNC: NEGATIVE MG/DL — SIGNIFICANT CHANGE UP
RBC # BLD: 5.19 M/UL — SIGNIFICANT CHANGE UP (ref 3.8–5.2)
RBC # FLD: 13.2 % — SIGNIFICANT CHANGE UP (ref 10.3–14.5)
RSV RNA NPH QL NAA+NON-PROBE: SIGNIFICANT CHANGE UP
SARS-COV-2 RNA SPEC QL NAA+PROBE: SIGNIFICANT CHANGE UP
SODIUM SERPL-SCNC: 125 MMOL/L — LOW (ref 135–145)
SOURCE RESPIRATORY: SIGNIFICANT CHANGE UP
SP GR SPEC: 1.02 — SIGNIFICANT CHANGE UP (ref 1–1.03)
UROBILINOGEN FLD QL: 0.2 MG/DL — SIGNIFICANT CHANGE UP (ref 0.2–1)
WBC # BLD: 5.87 K/UL — SIGNIFICANT CHANGE UP (ref 3.8–10.5)
WBC # FLD AUTO: 5.87 K/UL — SIGNIFICANT CHANGE UP (ref 3.8–10.5)

## 2025-05-19 PROCEDURE — 93010 ELECTROCARDIOGRAM REPORT: CPT

## 2025-05-19 PROCEDURE — 74177 CT ABD & PELVIS W/CONTRAST: CPT | Mod: 26

## 2025-05-19 PROCEDURE — 76705 ECHO EXAM OF ABDOMEN: CPT | Mod: 26

## 2025-05-19 PROCEDURE — 99285 EMERGENCY DEPT VISIT HI MDM: CPT

## 2025-05-19 RX ORDER — SODIUM CHLORIDE 9 G/1000ML
1000 INJECTION, SOLUTION INTRAVENOUS
Refills: 0 | Status: DISCONTINUED | OUTPATIENT
Start: 2025-05-19 | End: 2025-05-24

## 2025-05-19 RX ORDER — ACETAMINOPHEN 500 MG/5ML
600 LIQUID (ML) ORAL ONCE
Refills: 0 | Status: COMPLETED | OUTPATIENT
Start: 2025-05-19 | End: 2025-05-19

## 2025-05-19 RX ORDER — METOPROLOL SUCCINATE 50 MG/1
12.5 TABLET, EXTENDED RELEASE ORAL DAILY
Refills: 0 | Status: DISCONTINUED | OUTPATIENT
Start: 2025-05-19 | End: 2025-05-24

## 2025-05-19 RX ORDER — GLUCAGON 3 MG/1
1 POWDER NASAL ONCE
Refills: 0 | Status: DISCONTINUED | OUTPATIENT
Start: 2025-05-19 | End: 2025-05-24

## 2025-05-19 RX ORDER — GABAPENTIN 400 MG/1
1 CAPSULE ORAL
Refills: 0 | DISCHARGE

## 2025-05-19 RX ORDER — METOPROLOL SUCCINATE 50 MG/1
0.5 TABLET, EXTENDED RELEASE ORAL
Refills: 0 | DISCHARGE

## 2025-05-19 RX ORDER — LEVETIRACETAM 10 MG/ML
1 INJECTION, SOLUTION INTRAVENOUS
Refills: 0 | DISCHARGE

## 2025-05-19 RX ORDER — HYDROMORPHONE/SOD CHLOR,ISO/PF 2 MG/10 ML
0.5 SYRINGE (ML) INJECTION ONCE
Refills: 0 | Status: DISCONTINUED | OUTPATIENT
Start: 2025-05-19 | End: 2025-05-19

## 2025-05-19 RX ORDER — GABAPENTIN 400 MG/1
100 CAPSULE ORAL DAILY
Refills: 0 | Status: DISCONTINUED | OUTPATIENT
Start: 2025-05-19 | End: 2025-05-24

## 2025-05-19 RX ORDER — HEPARIN SODIUM 1000 [USP'U]/ML
5000 INJECTION INTRAVENOUS; SUBCUTANEOUS EVERY 8 HOURS
Refills: 0 | Status: DISCONTINUED | OUTPATIENT
Start: 2025-05-19 | End: 2025-05-24

## 2025-05-19 RX ORDER — DEXTROSE 50 % IN WATER 50 %
25 SYRINGE (ML) INTRAVENOUS ONCE
Refills: 0 | Status: DISCONTINUED | OUTPATIENT
Start: 2025-05-19 | End: 2025-05-24

## 2025-05-19 RX ORDER — EZETIMIBE 10 MG/1
10 TABLET ORAL DAILY
Refills: 0 | Status: DISCONTINUED | OUTPATIENT
Start: 2025-05-19 | End: 2025-05-24

## 2025-05-19 RX ORDER — ROSUVASTATIN CALCIUM 20 MG/1
1 TABLET, FILM COATED ORAL
Refills: 0 | DISCHARGE

## 2025-05-19 RX ORDER — AZELASTINE HYDROCHLORIDE 0.5 MG/ML
1 SOLUTION/ DROPS INTRAOCULAR
Refills: 0 | DISCHARGE

## 2025-05-19 RX ORDER — INSULIN LISPRO 100 U/ML
INJECTION, SOLUTION INTRAVENOUS; SUBCUTANEOUS AT BEDTIME
Refills: 0 | Status: DISCONTINUED | OUTPATIENT
Start: 2025-05-19 | End: 2025-05-24

## 2025-05-19 RX ORDER — LEVETIRACETAM 10 MG/ML
500 INJECTION, SOLUTION INTRAVENOUS
Refills: 0 | Status: DISCONTINUED | OUTPATIENT
Start: 2025-05-19 | End: 2025-05-24

## 2025-05-19 RX ORDER — OMEPRAZOLE 20 MG/1
1 CAPSULE, DELAYED RELEASE ORAL
Refills: 0 | DISCHARGE

## 2025-05-19 RX ORDER — ASPIRIN 325 MG
81 TABLET ORAL DAILY
Refills: 0 | Status: DISCONTINUED | OUTPATIENT
Start: 2025-05-19 | End: 2025-05-24

## 2025-05-19 RX ORDER — DEXTROSE 50 % IN WATER 50 %
12.5 SYRINGE (ML) INTRAVENOUS ONCE
Refills: 0 | Status: DISCONTINUED | OUTPATIENT
Start: 2025-05-19 | End: 2025-05-24

## 2025-05-19 RX ORDER — INSULIN LISPRO 100 U/ML
8 INJECTION, SOLUTION INTRAVENOUS; SUBCUTANEOUS ONCE
Refills: 0 | Status: COMPLETED | OUTPATIENT
Start: 2025-05-19 | End: 2025-05-19

## 2025-05-19 RX ORDER — DIAZEPAM 5 MG/1
1 TABLET ORAL
Refills: 0 | DISCHARGE

## 2025-05-19 RX ORDER — ALENDRONATE SODIUM 70 MG/1
1 TABLET ORAL
Refills: 0 | DISCHARGE

## 2025-05-19 RX ORDER — DEXTROSE 50 % IN WATER 50 %
15 SYRINGE (ML) INTRAVENOUS ONCE
Refills: 0 | Status: DISCONTINUED | OUTPATIENT
Start: 2025-05-19 | End: 2025-05-24

## 2025-05-19 RX ORDER — KETOTIFEN FUMARATE 0.35 MG/ML
1 SOLUTION/ DROPS OPHTHALMIC
Refills: 0 | Status: DISCONTINUED | OUTPATIENT
Start: 2025-05-19 | End: 2025-05-24

## 2025-05-19 RX ORDER — MAGNESIUM, ALUMINUM HYDROXIDE 200-200 MG
30 TABLET,CHEWABLE ORAL ONCE
Refills: 0 | Status: COMPLETED | OUTPATIENT
Start: 2025-05-19 | End: 2025-05-19

## 2025-05-19 RX ORDER — EZETIMIBE 10 MG/1
1 TABLET ORAL
Refills: 0 | DISCHARGE

## 2025-05-19 RX ORDER — ROSUVASTATIN CALCIUM 20 MG/1
20 TABLET, FILM COATED ORAL AT BEDTIME
Refills: 0 | Status: DISCONTINUED | OUTPATIENT
Start: 2025-05-19 | End: 2025-05-24

## 2025-05-19 RX ORDER — ASPIRIN 325 MG
0 TABLET ORAL
Refills: 0 | DISCHARGE

## 2025-05-19 RX ORDER — ASPIRIN 325 MG
1 TABLET ORAL
Refills: 0 | DISCHARGE

## 2025-05-19 RX ORDER — INSULIN LISPRO 100 U/ML
INJECTION, SOLUTION INTRAVENOUS; SUBCUTANEOUS
Refills: 0 | Status: DISCONTINUED | OUTPATIENT
Start: 2025-05-19 | End: 2025-05-24

## 2025-05-19 RX ADMIN — Medication 600 MILLIGRAM(S): at 15:18

## 2025-05-19 RX ADMIN — Medication 1000 MILLILITER(S): at 07:53

## 2025-05-19 RX ADMIN — Medication 20 MILLIGRAM(S): at 07:53

## 2025-05-19 RX ADMIN — LEVETIRACETAM 500 MILLIGRAM(S): 10 INJECTION, SOLUTION INTRAVENOUS at 21:03

## 2025-05-19 RX ADMIN — INSULIN LISPRO 8 UNIT(S): 100 INJECTION, SOLUTION INTRAVENOUS; SUBCUTANEOUS at 13:18

## 2025-05-19 RX ADMIN — Medication 10 MILLIGRAM(S): at 07:53

## 2025-05-19 RX ADMIN — HEPARIN SODIUM 5000 UNIT(S): 1000 INJECTION INTRAVENOUS; SUBCUTANEOUS at 20:43

## 2025-05-19 RX ADMIN — METOPROLOL SUCCINATE 12.5 MILLIGRAM(S): 50 TABLET, EXTENDED RELEASE ORAL at 21:02

## 2025-05-19 RX ADMIN — ROSUVASTATIN CALCIUM 20 MILLIGRAM(S): 20 TABLET, FILM COATED ORAL at 20:44

## 2025-05-19 RX ADMIN — Medication 240 MILLIGRAM(S): at 07:58

## 2025-05-19 RX ADMIN — Medication 600 MILLIGRAM(S): at 08:18

## 2025-05-19 RX ADMIN — Medication 30 MILLILITER(S): at 07:52

## 2025-05-19 RX ADMIN — Medication 240 MILLIGRAM(S): at 21:49

## 2025-05-19 RX ADMIN — INSULIN LISPRO 1: 100 INJECTION, SOLUTION INTRAVENOUS; SUBCUTANEOUS at 21:05

## 2025-05-19 RX ADMIN — Medication 1000 MILLILITER(S): at 15:18

## 2025-05-19 RX ADMIN — Medication 0.5 MILLIGRAM(S): at 12:45

## 2025-05-19 NOTE — H&P ADULT - HISTORY OF PRESENT ILLNESS
71 yo F pmhx of IDDM, HTN, HLD, Epilepsy on keppra/vimpat presents for one week of epigastric abdominal pain (worse after eating) and LUQ/LLQ and L flank pain. Patient states that she has been having elevated blood sugars and endorses new pruritic full body rash that began one week ago. Denies fever, chills, chest pain, shortness of breath, new allergens, nausea, vomiting, hematuria, dysuria, hematochezia, constipation, or any other symptoms at this time

## 2025-05-19 NOTE — ED ADULT NURSE NOTE - BREATH SOUNDS, MLM
Clear CHF from volume overload   Echo severe LV systolic fxn,  LVEF 27%  ICD (implantable cardioverter-defibrillator) in place  Per Cardiology give 20 IV lasix after blood transfusions  continue on daily diuresis with Lasix 40 mg oral daily

## 2025-05-19 NOTE — H&P ADULT - NSICDXNOPASTSURGICALHX_GEN_ALL_CORE
Per verbal order by Dr. Ansari, A1c ordered to be done today. TSH and FT4 ordered to be done in 6 months.   <-- Click to add NO significant Past Surgical History

## 2025-05-19 NOTE — ED PROVIDER NOTE - DIFFERENTIAL DIAGNOSIS
Ddx includes, however, is not limited to: appy, diverticulitis, cholecystitis, kidney stone, pyelo, uti, other Differential Diagnosis

## 2025-05-19 NOTE — H&P ADULT - NEGATIVE GENERAL SYMPTOMS
no sweating/no anorexia/no weight loss/no weight gain/no polyphagia/no polyuria/no polydipsia/no malaise/no fatigue

## 2025-05-19 NOTE — H&P ADULT - ASSESSMENT
71 yo F pmhx of IDDM, HTN, HLD, Epilepsy on keppra/vimpat presents for one week of epigastric abdominal pain (worse after eating) and LUQ/LLQ and L flank pain. Patient states that she has been having elevated blood sugars and endorses new pruritic full body rash that began one week ago. Denies fever, chills, chest pain, shortness of breath, new allergens, nausea, vomiting, hematuria, dysuria, hematochezia, constipation, or any other symptoms at this time 69 yo F pmhx of IDDM, HTN, HLD, Epilepsy on keppra/vimpat presents for one week of epigastric abdominal pain (worse after eating) and LUQ/LLQ and L flank pain. Patient states that she has been having elevated blood sugars and endorses new pruritic full body rash that began one week ago. Denies fever, chills, chest pain, shortness of breath, new allergens, nausea, vomiting, hematuria, dysuria, hematochezia, constipation, or any other symptoms at this time    cholecocholithasis  - MRCP  - GI consult for poss ERCP    hyponatremia  - urine osms and lytes  - iv fluids ns  - nephrology consult    seizure d/o  - c/w keppra    neuropathy  - c/w gabapentin    diabetes  - fs qid  - hgb a1c  - insulin ss    HLD  - c/w statin  - c/w zetia    dvt px  - sq heparin

## 2025-05-19 NOTE — PATIENT PROFILE ADULT - FUNCTIONAL ASSESSMENT - BASIC MOBILITY 6.
3-calculated by average/Not able to assess (calculate score using [facility] averaging method)

## 2025-05-19 NOTE — ED PROVIDER NOTE - CLINICAL SUMMARY MEDICAL DECISION MAKING FREE TEXT BOX
69 yo F pmhx of IDDM, HTN, HLD, Epilepsy on keppra/vimpat presents for one week of epigastric abdominal pain (worse after eating) and LUQ/LLQ and L flank pain. Patient states that she has been having elevated blood sugars and endorses new pruritic full body rash that began one week ago.     On exam patient ttp in LUQ/LLQ. Skin has papular rash. Will obtain CT to eval diverticulitis vs nephrolithiasis. Signs and symptoms also consistent with possible gastric ulcer, will treat with Pepcid, Maalox, and tylenol. Dispo pending results and re-evaluation. Leila Fisher DO (PGY-2) 69 yo F pmhx of IDDM, HTN, HLD, Epilepsy on keppra/vimpat presents for one week of epigastric abdominal pain (worse after eating) and LUQ/LLQ and L flank pain. Patient states that she has been having elevated blood sugars and endorses new pruritic full body rash that began one week ago.       On exam patient ttp in LUQ/LLQ. Skin has papular rash. Will obtain CT to eval diverticulitis vs nephrolithiasis. Signs and symptoms also consistent with possible gastric ulcer, will treat with Pepcid, Maalox, and tylenol. Dispo pending results and re-evaluation. Leila Fisher DO (PGY-2)    DANIELLA Schofield MD: Agree with resident/ACP MDM, assessment and plan as above. Pt afebrile, Denies any new exposures, insect bites, travel.

## 2025-05-19 NOTE — ED ADULT NURSE NOTE - OBJECTIVE STATEMENT
71 yo female presents to the ED Aaox4 ambulatory with complaints of abd pain and rash x 1 week. PMH DM. Pt states she has been having abd pain, worsening over x 1 week. Pt abd is soft tender to LLQ, non distended. Pt states last bm x 1 day ago. Pt Denies headache, dizziness, vision changes, chest pain, shortness of breath, nausea, vomiting, diarrhea, fevers, chills, dysuria, hematuria, recent illness travel or fall.

## 2025-05-19 NOTE — H&P ADULT - NSHPLABSRESULTS_GEN_ALL_CORE
LABS:                        14.3   5.87  )-----------( 305      ( 19 May 2025 07:45 )             42.4     -    125[L]  |  87[L]  |  10  ----------------------------<  270[H]  4.0   |  23  |  0.57    Ca    10.0      19 May 2025 07:45    TPro  7.9  /  Alb  4.5  /  TBili  0.5  /  DBili  x   /  AST  25  /  ALT  22  /  AlkPhos  106  -      Urinalysis Basic - ( 19 May 2025 09:06 )    Color: Yellow / Appearance: Clear / S.020 / pH: x  Gluc: x / Ketone: x  / Bili: Negative / Urobili: 0.2 mg/dL   Blood: x / Protein: Negative mg/dL / Nitrite: Negative   Leuk Esterase: Negative / RBC: x / WBC x   Sq Epi: x / Non Sq Epi: x / Bacteria: x            RADIOLOGY & ADDITIONAL TESTS:

## 2025-05-19 NOTE — ED ADULT NURSE NOTE - NSICDXFAMILYHX_GEN_ALL_CORE_FT
None
FAMILY HISTORY:  Grandparent  Still living? No  FH: type 1 diabetes, Age at diagnosis: Age Unknown

## 2025-05-19 NOTE — ED PROVIDER NOTE - PHYSICAL EXAMINATION
Leila Fisher DO (PGY2)   Physical Exam:    Gen: NAD, AOx3  Lung: CTAB, no respiratory distress, no wheezes/rhonchi/rales B/L  CV: RRR, no murmurs, rubs or gallops  Abd: LUQ and LLQ ttp. No guarding, no rigidity, no rebound tenderness. Bilat CVA ttp  Skin: small raised erythematous papules on body diffusely Leila Fisher DO (PGY2)   Physical Exam:    Gen: NAD, AOx3  Lung: CTAB, no respiratory distress, no wheezes/rhonchi/rales B/L  CV: RRR, no murmurs, rubs or gallops  Abd: RUQ, LUQ, and LLQ ttp. No guarding, no rigidity, no rebound tenderness. Bilat CVA ttp  Skin: small raised erythematous papules on body diffusely

## 2025-05-19 NOTE — ED ADULT TRIAGE NOTE - CHIEF COMPLAINT QUOTE
LUQ pain x1 week. Subjective fevers. Endorses "fluctuating" BG. Endorses taking tylenol w/o relief. Denies nausea, vomiting and diarrhea. pmh: T2DM (on insulin)

## 2025-05-19 NOTE — ED PROVIDER NOTE - OBJECTIVE STATEMENT
71 yo F pmhx of IDDM, HTN, HLD, Epilepsy on keppra/vimpat presents for one week of epigastric abdominal pain (worse after eating) and LUQ/LLQ and L flank pain. Patient states that she has been having elevated blood sugars and endorses new pruritic full body rash that began one week ago. Denies fever, chills, chest pain, shortness of breath, new allergens, nausea, vomiting, hematuria, dysuria, hematochezia, constipation, or any other symptoms at this time. Leila Fisher DO (PGY-2)

## 2025-05-19 NOTE — ED PROVIDER NOTE - PROGRESS NOTE DETAILS
Patient still in pain.  CT suspicious for choledocho.  Will admit for ERCP, GI eval, and pain control.  Patient aware and agrees with plan.

## 2025-05-20 LAB
A1C WITH ESTIMATED AVERAGE GLUCOSE RESULT: 8.7 % — HIGH (ref 4–5.6)
ALBUMIN SERPL ELPH-MCNC: 4 G/DL — SIGNIFICANT CHANGE UP (ref 3.3–5)
ALP SERPL-CCNC: 97 U/L — SIGNIFICANT CHANGE UP (ref 40–120)
ALT FLD-CCNC: 18 U/L — SIGNIFICANT CHANGE UP (ref 10–45)
ANION GAP SERPL CALC-SCNC: 21 MMOL/L — HIGH (ref 5–17)
AST SERPL-CCNC: 22 U/L — SIGNIFICANT CHANGE UP (ref 10–40)
BILIRUB DIRECT SERPL-MCNC: 0.2 MG/DL — SIGNIFICANT CHANGE UP (ref 0–0.3)
BILIRUB INDIRECT FLD-MCNC: 0.3 MG/DL — SIGNIFICANT CHANGE UP (ref 0.2–1)
BILIRUB SERPL-MCNC: 0.5 MG/DL — SIGNIFICANT CHANGE UP (ref 0.2–1.2)
BUN SERPL-MCNC: 10 MG/DL — SIGNIFICANT CHANGE UP (ref 7–23)
CALCIUM SERPL-MCNC: 9.2 MG/DL — SIGNIFICANT CHANGE UP (ref 8.4–10.5)
CHLORIDE SERPL-SCNC: 90 MMOL/L — LOW (ref 96–108)
CO2 SERPL-SCNC: 17 MMOL/L — LOW (ref 22–31)
CREAT SERPL-MCNC: 0.6 MG/DL — SIGNIFICANT CHANGE UP (ref 0.5–1.3)
EGFR: 96 ML/MIN/1.73M2 — SIGNIFICANT CHANGE UP
EGFR: 96 ML/MIN/1.73M2 — SIGNIFICANT CHANGE UP
ESTIMATED AVERAGE GLUCOSE: 203 MG/DL — HIGH (ref 68–114)
FOLATE SERPL-MCNC: 17.2 NG/ML — SIGNIFICANT CHANGE UP
GLUCOSE BLDC GLUCOMTR-MCNC: 171 MG/DL — HIGH (ref 70–99)
GLUCOSE BLDC GLUCOMTR-MCNC: 272 MG/DL — HIGH (ref 70–99)
GLUCOSE BLDC GLUCOMTR-MCNC: 301 MG/DL — HIGH (ref 70–99)
GLUCOSE BLDC GLUCOMTR-MCNC: 336 MG/DL — HIGH (ref 70–99)
GLUCOSE SERPL-MCNC: 421 MG/DL — HIGH (ref 70–99)
HCT VFR BLD CALC: 39.7 % — SIGNIFICANT CHANGE UP (ref 34.5–45)
HGB BLD-MCNC: 13.4 G/DL — SIGNIFICANT CHANGE UP (ref 11.5–15.5)
MAGNESIUM SERPL-MCNC: 1.9 MG/DL — SIGNIFICANT CHANGE UP (ref 1.6–2.6)
MCHC RBC-ENTMCNC: 27.9 PG — SIGNIFICANT CHANGE UP (ref 27–34)
MCHC RBC-ENTMCNC: 33.8 G/DL — SIGNIFICANT CHANGE UP (ref 32–36)
MCV RBC AUTO: 82.7 FL — SIGNIFICANT CHANGE UP (ref 80–100)
NRBC BLD AUTO-RTO: 0 /100 WBCS — SIGNIFICANT CHANGE UP (ref 0–0)
PHOSPHATE SERPL-MCNC: 2.6 MG/DL — SIGNIFICANT CHANGE UP (ref 2.5–4.5)
PLATELET # BLD AUTO: 254 K/UL — SIGNIFICANT CHANGE UP (ref 150–400)
POTASSIUM SERPL-MCNC: 4.3 MMOL/L — SIGNIFICANT CHANGE UP (ref 3.5–5.3)
POTASSIUM SERPL-SCNC: 4.3 MMOL/L — SIGNIFICANT CHANGE UP (ref 3.5–5.3)
PROCALCITONIN SERPL-MCNC: 0.13 NG/ML — HIGH (ref 0.02–0.1)
PROT SERPL-MCNC: 7 G/DL — SIGNIFICANT CHANGE UP (ref 6–8.3)
RBC # BLD: 4.8 M/UL — SIGNIFICANT CHANGE UP (ref 3.8–5.2)
RBC # FLD: 13.4 % — SIGNIFICANT CHANGE UP (ref 10.3–14.5)
SODIUM SERPL-SCNC: 128 MMOL/L — LOW (ref 135–145)
TSH SERPL-MCNC: 2.32 UIU/ML — SIGNIFICANT CHANGE UP (ref 0.27–4.2)
VIT B12 SERPL-MCNC: >2000 PG/ML — HIGH (ref 232–1245)
WBC # BLD: 7.1 K/UL — SIGNIFICANT CHANGE UP (ref 3.8–10.5)
WBC # FLD AUTO: 7.1 K/UL — SIGNIFICANT CHANGE UP (ref 3.8–10.5)

## 2025-05-20 PROCEDURE — 74183 MRI ABD W/O CNTR FLWD CNTR: CPT | Mod: 26

## 2025-05-20 RX ORDER — OXYCODONE HYDROCHLORIDE 30 MG/1
2.5 TABLET ORAL ONCE
Refills: 0 | Status: DISCONTINUED | OUTPATIENT
Start: 2025-05-20 | End: 2025-05-20

## 2025-05-20 RX ORDER — ACETAMINOPHEN 500 MG/5ML
600 LIQUID (ML) ORAL ONCE
Refills: 0 | Status: COMPLETED | OUTPATIENT
Start: 2025-05-20 | End: 2025-05-20

## 2025-05-20 RX ORDER — ACETAMINOPHEN 500 MG/5ML
600 LIQUID (ML) ORAL ONCE
Refills: 0 | Status: DISCONTINUED | OUTPATIENT
Start: 2025-05-20 | End: 2025-05-20

## 2025-05-20 RX ORDER — SALINE 7; 19 G/118ML; G/118ML
1 ENEMA RECTAL ONCE
Refills: 0 | Status: DISCONTINUED | OUTPATIENT
Start: 2025-05-20 | End: 2025-05-24

## 2025-05-20 RX ADMIN — INSULIN LISPRO 3: 100 INJECTION, SOLUTION INTRAVENOUS; SUBCUTANEOUS at 17:15

## 2025-05-20 RX ADMIN — ROSUVASTATIN CALCIUM 20 MILLIGRAM(S): 20 TABLET, FILM COATED ORAL at 21:33

## 2025-05-20 RX ADMIN — METOPROLOL SUCCINATE 12.5 MILLIGRAM(S): 50 TABLET, EXTENDED RELEASE ORAL at 04:30

## 2025-05-20 RX ADMIN — KETOTIFEN FUMARATE 1 DROP(S): 0.35 SOLUTION/ DROPS OPHTHALMIC at 04:29

## 2025-05-20 RX ADMIN — HEPARIN SODIUM 5000 UNIT(S): 1000 INJECTION INTRAVENOUS; SUBCUTANEOUS at 21:31

## 2025-05-20 RX ADMIN — INSULIN LISPRO 4: 100 INJECTION, SOLUTION INTRAVENOUS; SUBCUTANEOUS at 08:13

## 2025-05-20 RX ADMIN — INSULIN LISPRO 2: 100 INJECTION, SOLUTION INTRAVENOUS; SUBCUTANEOUS at 21:32

## 2025-05-20 RX ADMIN — OXYCODONE HYDROCHLORIDE 2.5 MILLIGRAM(S): 30 TABLET ORAL at 05:55

## 2025-05-20 RX ADMIN — HEPARIN SODIUM 5000 UNIT(S): 1000 INJECTION INTRAVENOUS; SUBCUTANEOUS at 04:30

## 2025-05-20 RX ADMIN — KETOTIFEN FUMARATE 1 DROP(S): 0.35 SOLUTION/ DROPS OPHTHALMIC at 17:01

## 2025-05-20 RX ADMIN — Medication 240 MILLIGRAM(S): at 22:33

## 2025-05-20 RX ADMIN — LEVETIRACETAM 500 MILLIGRAM(S): 10 INJECTION, SOLUTION INTRAVENOUS at 04:30

## 2025-05-20 RX ADMIN — GABAPENTIN 100 MILLIGRAM(S): 400 CAPSULE ORAL at 17:01

## 2025-05-20 RX ADMIN — Medication 40 MILLIGRAM(S): at 04:30

## 2025-05-20 RX ADMIN — LEVETIRACETAM 500 MILLIGRAM(S): 10 INJECTION, SOLUTION INTRAVENOUS at 17:01

## 2025-05-20 NOTE — CONSULT NOTE ADULT - ASSESSMENT
Assessment:  #Choledocholithiasis  #CBD dilation  #Constipation    CT 5/19: Hyperdensity in the distal common bile duct measuring 0.8 cm, suspicious for choledocholithiasis. Biliary duct dilation. Rectum distended with stool    Plan:  - Imaging reviewed and discussed with patient  - Pending MRCP  - Tentative plan for ERCP tomorrow with Dr. Raza pending MRCP results. NPO after midnight.   - Pain medications prn  - Enema x1 for constipation/ CT findings of distended rectum with stool  - Given recurrent episodes of choledocholithiasis discussed with patient recommendation for ccy to reduce number of ERCPs. Patient would like to hold off on discussions for now but can follow up outpatient.     Plan discussed w/ patient    I reviewed the overnight course of events on the unit, re-confirming the patient history. I discussed the care with the patient   The plan of care was discussed by the nurse practitioner and modifications were made to the notation where appropriate.   Differential diagnosis and plan of care discussed with patient after the evaluation  35 minutes spent on total encounter of which more than fifty percent of the encounter was spent counseling and/or coordinating care with the attending physician.  Advanced care planning was discussed with patient and family.  Advanced care planning forms were reviewed and discussed.  Risks, benefits and alternatives of gastroenterologic procedures/interventions were discussed in detail and all questions were answered.     Assessment:  #Choledocholithiasis  #CBD dilation  #Constipation    CT 5/19: Hyperdensity in the distal common bile duct measuring 0.8 cm, suspicious for choledocholithiasis. Biliary duct dilation. Rectum distended with stool    Plan:  - Imaging reviewed and discussed with patient  - Pending MRCP  - Plan for ERCP tomorrow with Dr. Raza, NPO after midnight.   - Pain medications prn  - Enema x1 for constipation/ CT findings of distended rectum with stool  - Given recurrent episodes of choledocholithiasis discussed with patient recommendation for ccy to reduce number of ERCPs. Patient would like to hold off on discussions for now but can follow up outpatient.     Plan discussed w/ patient and ACP    I reviewed the overnight course of events on the unit, re-confirming the patient history. I discussed the care with the patient   The plan of care was discussed by the nurse practitioner and modifications were made to the notation where appropriate.   Differential diagnosis and plan of care discussed with patient after the evaluation  35 minutes spent on total encounter of which more than fifty percent of the encounter was spent counseling and/or coordinating care with the attending physician.  Advanced care planning was discussed with patient and family.  Advanced care planning forms were reviewed and discussed.  Risks, benefits and alternatives of gastroenterologic procedures/interventions were discussed in detail and all questions were answered.

## 2025-05-20 NOTE — CONSULT NOTE ADULT - ASSESSMENT
71 yo F pmhx of IDDM, HTN, HLD, Epilepsy on keppra/vimpat presents for one week of epigastric abdominal pain (worse after eating) and LUQ/LLQ and L flank pain. Nephro called for hyponatremia    Hyponatremia and Pseudohyponatremia  Etiology multifactorial could be hyperglycemia, pain  Corrected for glucose sodium is 133  Advised fluid restrict to 1.5 L per day  Needs better control of hyperglycemia  Will send Urine studies  Monitor urine OP    Acidosis  Likely in the setting of Hyperglycemia  Monitor  Correct hyperglycemia    Abdominal pain  Per primary

## 2025-05-20 NOTE — CONSULT NOTE ADULT - SUBJECTIVE AND OBJECTIVE BOX
[facility]    Dr. Jose Flannery     Office [phone] (9 am to 5 pm)  Service : [phone] ( 5pm to 9 am)  Also Available on Teams        RENAL INITIAL CONSULT NOTE: DATE OF SERVICE 05-20-25 @ 12:06    HPI:  71 yo F pmhx of IDDM, HTN, HLD, Epilepsy on keppra/vimpat presents for one week of epigastric abdominal pain (worse after eating) and LUQ/LLQ and L flank pain. Patient states that she has been having elevated blood sugars and endorses new pruritic full body rash that began one week ago. Denies fever, chills, chest pain, shortness of breath, new allergens, nausea, vomiting, hematuria, dysuria, hematochezia, constipation, or any other symptoms at this time (19 May 2025 14:13)      Allergies:  penicillin (Headache)      PAST MEDICAL & SURGICAL HISTORY:  HLD (hyperlipidemia)      DM (diabetes mellitus)  Type 1      Epilepsy      HTN (hypertension)      Cholelithiasis      No significant past surgical history          Home Medications Reviewed    Hospital Medications:   MEDICATIONS  (STANDING):  aspirin enteric coated 81 milliGRAM(s) Oral daily  dextrose 5%. 1000 milliLiter(s) (50 mL/Hr) IV Continuous <Continuous>  dextrose 5%. 1000 milliLiter(s) (100 mL/Hr) IV Continuous <Continuous>  dextrose 50% Injectable 25 Gram(s) IV Push once  dextrose 50% Injectable 12.5 Gram(s) IV Push once  dextrose 50% Injectable 25 Gram(s) IV Push once  ezetimibe 10 milliGRAM(s) Oral daily  gabapentin 100 milliGRAM(s) Oral daily  glucagon  Injectable 1 milliGRAM(s) IntraMuscular once  heparin   Injectable 5000 Unit(s) SubCutaneous every 8 hours  insulin lispro (ADMELOG) corrective regimen sliding scale   SubCutaneous three times a day before meals  insulin lispro (ADMELOG) corrective regimen sliding scale   SubCutaneous at bedtime  ketotifen 0.025% Ophthalmic Solution 1 Drop(s) Both EYES two times a day  levETIRAcetam 500 milliGRAM(s) Oral two times a day  metoprolol succinate ER 12.5 milliGRAM(s) Oral daily  pantoprazole    Tablet 40 milliGRAM(s) Oral before breakfast  rosuvastatin 20 milliGRAM(s) Oral at bedtime  sodium chloride 0.9%. 1000 milliLiter(s) (75 mL/Hr) IV Continuous <Continuous>      SOCIAL HISTORY:  Denies ETOh, Smoking,     FAMILY HISTORY:  FH: type 1 diabetes (Grandparent)        REVIEW OF SYSTEMS:  CONSTITUTIONAL: No weakness, fevers or chills  EYES/ENT: No visual changes;  No vertigo or throat pain   NECK: No pain or stiffness  RESPIRATORY: No cough, wheezing, hemoptysis; No shortness of breath  CARDIOVASCULAR: No chest pain or palpitations.  GASTROINTESTINAL: No abdominal or epigastric pain. No nausea, vomiting, or hematemesis; No diarrhea or constipation. No melena or hematochezia.  GENITOURINARY: No dysuria, frequency, foamy urine, urinary urgency, incontinence or hematuria  NEUROLOGICAL: No numbness or weakness  SKIN: No itching, burning, rashes, or lesions   VASCULAR: No bilateral lower extremity edema.   All other review of systems is negative unless indicated above.    VITALS:  T(F): 97.7 (05-20-25 @ 11:36), Max: 98.1 (05-19-25 @ 17:19)  HR: 87 (05-20-25 @ 11:36)  BP: 123/72 (05-20-25 @ 11:36)  RR: 18 (05-20-25 @ 11:36)  SpO2: 95% (05-20-25 @ 11:36)  Wt(kg): --    05-19 @ 07:01  -  05-20 @ 07:00  --------------------------------------------------------  IN: 750 mL / OUT: 0 mL / NET: 750 mL    05-20 @ 07:01  -  05-20 @ 12:06  --------------------------------------------------------  IN: 0 mL / OUT: 0 mL / NET: 0 mL          PHYSICAL EXAM:  Constitutional: NAD  HEENT: anicteric sclera, oropharynx clear, MMM  Neck: No JVD  Respiratory: CTAB, no wheezes, rales or rhonchi  Cardiovascular: S1, S2, RRR  Gastrointestinal: BS+, soft, NT/ND  Extremities: No cyanosis or clubbing. No peripheral edema  Neurological: A/O x 3, no focal deficits  Psychiatric: Normal mood, normal affect  : No CVA tenderness. No madera.   Skin: No rashes  Vascular Access:    LABS:  05-20    128[L]  |  90[L]  |  10  ----------------------------<  421[H]  4.3   |  17[L]  |  0.60    Ca    9.2      20 May 2025 05:42  Phos  2.6     05-20  Mg     1.9     05-20    TPro  7.0  /  Alb  4.0  /  TBili  0.5  /  DBili  0.2  /  AST  22  /  ALT  18  /  AlkPhos  97  05-20    Creatinine Trend: 0.60 <--, 0.57 <--                        13.4   7.10  )-----------( 254      ( 20 May 2025 05:42 )             39.7     Urine Studies:  Urinalysis Basic - ( 20 May 2025 05:42 )    Color:  / Appearance:  / SG:  / pH:   Gluc: 421 mg/dL / Ketone:   / Bili:  / Urobili:    Blood:  / Protein:  / Nitrite:    Leuk Esterase:  / RBC:  / WBC    Sq Epi:  / Non Sq Epi:  / Bacteria:           RADIOLOGY & ADDITIONAL STUDIES:

## 2025-05-20 NOTE — PROGRESS NOTE ADULT - ASSESSMENT
71 yo F pmhx of IDDM, HTN, HLD, Epilepsy on keppra/vimpat presents for one week of epigastric abdominal pain (worse after eating) and LUQ/LLQ and L flank pain. Patient states that she has been having elevated blood sugars and endorses new pruritic full body rash that began one week ago. Denies fever, chills, chest pain, shortness of breath, new allergens, nausea, vomiting, hematuria, dysuria, hematochezia, constipation, or any other symptoms at this time    cholecocholithasis  - MRCP  - GI consult for poss ERCP    hyponatremia  - urine osms and lytes  - iv fluids ns  - nephrology consult    seizure d/o  - c/w keppra    neuropathy  - c/w gabapentin    diabetes  - fs qid  - hgb a1c  - insulin ss    HLD  - c/w statin  - c/w zetia    dvt px  - sq heparin

## 2025-05-20 NOTE — CONSULT NOTE ADULT - SUBJECTIVE AND OBJECTIVE BOX
Chief Complaint:  Patient is a 70y old  Female who presents with a chief complaint of     HPI:  69 yo F pmhx of IDDM, HTN, HLD, Epilepsy on keppra/vimpat presents for one week of epigastric abdominal pain (worse after eating) and LUQ/LLQ and L flank pain. Patient states that she has been having elevated blood sugars and endorses new pruritic full body rash that began one week ago. Denies fever, chills, chest pain, shortness of breath, new allergens, nausea, vomiting, hematuria, dysuria, hematochezia, constipation, or any other symptoms at this time    Interval HPI:  Pt s/e  Reports x1 week of epigastric pain radiating to back but reports pain resolving, and reports pain minimal today  Denies N/V  Reports hx of choledocholithiasis requiring ERCP in 2021  Denies recent fevers. C/o constipation, reports no BM x2 days    Allergies:  penicillin (Headache)      Medications:  aspirin enteric coated 81 milliGRAM(s) Oral daily  dextrose 5%. 1000 milliLiter(s) IV Continuous <Continuous>  dextrose 5%. 1000 milliLiter(s) IV Continuous <Continuous>  dextrose 50% Injectable 25 Gram(s) IV Push once  dextrose 50% Injectable 12.5 Gram(s) IV Push once  dextrose 50% Injectable 25 Gram(s) IV Push once  dextrose Oral Gel 15 Gram(s) Oral once PRN  ezetimibe 10 milliGRAM(s) Oral daily  gabapentin 100 milliGRAM(s) Oral daily  glucagon  Injectable 1 milliGRAM(s) IntraMuscular once  heparin   Injectable 5000 Unit(s) SubCutaneous every 8 hours  insulin lispro (ADMELOG) corrective regimen sliding scale   SubCutaneous three times a day before meals  insulin lispro (ADMELOG) corrective regimen sliding scale   SubCutaneous at bedtime  ketotifen 0.025% Ophthalmic Solution 1 Drop(s) Both EYES two times a day  levETIRAcetam 500 milliGRAM(s) Oral two times a day  metoprolol succinate ER 12.5 milliGRAM(s) Oral daily  pantoprazole    Tablet 40 milliGRAM(s) Oral before breakfast  rosuvastatin 20 milliGRAM(s) Oral at bedtime  sodium chloride 0.9%. 1000 milliLiter(s) IV Continuous <Continuous>      PMHX/PSHX:  HLD (hyperlipidemia)    DM2 (diabetes mellitus, type 2)    DM (diabetes mellitus)    Epilepsy    HTN (hypertension)    Cholelithiasis    No significant past surgical history        Family history:  FH: type 1 diabetes (Grandparent)        Social History:     ROS:     General:  No wt loss, fevers, chills, night sweats, fatigue,   Eyes:  Good vision, no reported pain  ENT:  No sore throat, pain, runny nose, dysphagia  CV:  No pain, palpitations, hypo/hypertension  Resp:  No dyspnea, cough, tachypnea, wheezing  GI:  No pain, No nausea, No vomiting, No diarrhea, No constipation, No weight loss, No fever, No pruritis, No rectal bleeding, No tarry stools, No dysphagia,  :  No pain, bleeding, incontinence, nocturia  Muscle:  No pain, weakness  Neuro:  No weakness, tingling, memory problems  Psych:  No fatigue, insomnia, mood problems, depression  Endocrine:  No polyuria, polydipsia, cold/heat intolerance  Heme:  No petechiae, ecchymosis, easy bruisability  Skin:  No rash, tattoos, scars, edema      PHYSICAL EXAM:   Vital Signs:  Vital Signs Last 24 Hrs  T(C): 36.5 (20 May 2025 11:36), Max: 36.7 (19 May 2025 17:19)  T(F): 97.7 (20 May 2025 11:36), Max: 98.1 (19 May 2025 17:19)  HR: 87 (20 May 2025 11:36) (72 - 87)  BP: 123/72 (20 May 2025 11:36) (120/69 - 150/79)  BP(mean): --  RR: 18 (20 May 2025 11:36) (16 - 18)  SpO2: 95% (20 May 2025 11:36) (94% - 98%)    Parameters below as of 20 May 2025 11:36  Patient On (Oxygen Delivery Method): room air      Daily     Daily     GENERAL:  Appears stated age, well-groomed, well-nourished, no distress  HEENT:  NC/AT,  conjunctivae clear and pink, no thyromegaly, nodules, adenopathy, no JVD, sclera -anicteric  CHEST:  Full & symmetric excursion, no increased effort, breath sounds clear  HEART:  Regular rhythm, S1, S2, no murmur/rub/S3/S4, no abdominal bruit, no edema  ABDOMEN:  Soft, non-tender, non-distended, normoactive bowel sounds,  no masses ,no hepato-splenomegaly, no signs of chronic liver disease  EXTEREMITIES:  no cyanosis,clubbing or edema  SKIN:  No rash/erythema/ecchymoses/petechiae/wounds/abscess/warm/dry  NEURO:  Alert, oriented, no asterixis, no tremor, no encephalopathy    LABS:                        13.4   7.10  )-----------( 254      ( 20 May 2025 05:42 )             39.7     05-20    128[L]  |  90[L]  |  10  ----------------------------<  421[H]  4.3   |  17[L]  |  0.60    Ca    9.2      20 May 2025 05:42  Phos  2.6     05-20  Mg     1.9     05-20    TPro  7.0  /  Alb  4.0  /  TBili  0.5  /  DBili  0.2  /  AST  22  /  ALT  18  /  AlkPhos  97  05-20    LIVER FUNCTIONS - ( 20 May 2025 05:42 )  Alb: 4.0 g/dL / Pro: 7.0 g/dL / ALK PHOS: 97 U/L / ALT: 18 U/L / AST: 22 U/L / GGT: x             Urinalysis Basic - ( 20 May 2025 05:42 )    Color: x / Appearance: x / SG: x / pH: x  Gluc: 421 mg/dL / Ketone: x  / Bili: x / Urobili: x   Blood: x / Protein: x / Nitrite: x   Leuk Esterase: x / RBC: x / WBC x   Sq Epi: x / Non Sq Epi: x / Bacteria: x          Imaging:  < from: CT Abdomen and Pelvis w/ IV Cont (05.19.25 @ 08:58) >    ACC: [id number] EXAM:  CT ABDOMEN AND PELVIS IC   ORDERED BY:  JUSTINA BUSCH     PROCEDURE DATE:  05/19/2025          INTERPRETATION:  CLINICAL INFORMATION: Left upper quadrant and left lower   quadrant abdominal pain.    COMPARISON: CT abdomen andpelvis 12/24/2021. MRI abdomen 12/27/2021.    CONTRAST/COMPLICATIONS:  IV Contrast: Omnipaque 350  75 cc administered   25 cc discarded  Oral Contrast: NONE    PROCEDURE:  CT of the Abdomen and Pelvis was performed.  Sagittal and coronal reformats were performed.    FINDINGS:  LOWER CHEST: Mild bibasilar subsegmental atelectasis. Small patchy   groundglass opacities in the right lower lobe.    LIVER: Within normal limits.  BILE DUCTS: Hyperdensity in the distal common bile duct measuring 0.8 x   0.4 cm (602:31), suspicious for choledocholithiasis. The common bile   duct/common hepatic duct is dilated to 10 mm. Minimal central   intrahepatic biliary duct dilation.  GALLBLADDER: Cholelithiasis. Diffuse mild wall thickening. Fundal   adenomyomatosis.  SPLEEN: Within normal limits.  PANCREAS: Within normal limits.  ADRENALS: Within normal limits.  KIDNEYS/URETERS: No hydronephrosis. Subcentimeter hypodense foci in the   right kidney that are too small to characterize.    BLADDER: Distended.  REPRODUCTIVE ORGANS: Uterus and adnexa within normal limits.    BOWEL: Moderate stool burden. The rectum is distended with stool. No   bowel obstruction. Appendix is normal.  PERITONEUM/RETROPERITONEUM: Small amount of free peritoneal fluid in the   pelvis, similar to 12/24/2021.  VESSELS: Atherosclerotic changes. Asymmetric prominence of the left   parauterine veins and prominence of the left ovarian vein measuring up to   6 mm in caliber.  LYMPH NODES: No lymphadenopathy.  ABDOMINAL WALL: Withinnormal limits.  BONES: Degenerative changes.    IMPRESSION:  *  Hyperdensity in the distal common bile duct measuring 0.8 cm,   suspicious for choledocholithiasis. Biliary duct dilation, the CBD/CHD   measuring 10 mm in caliber.  *  Cholelithiasis. Mild diffuse gallbladder wall thickening. Suggest   right upper quadrant ultrasound or HIDA scan to assess for acute   cholecystitis.  *  Small amount of pelvic ascites, similar in comparison to 12/24/2021.  *  Nonspecific mild asymmetric prominence ofthe left parauterine veins   and left ovarian vein, findings that can be seen in the clinical setting   of pelvic congestion syndrome. Correlate clinically.  *  Patchy groundglass opacities in the right lower lobe of unclear   etiology.      < end of copied text >               Chief Complaint:  Patient is a 70y old  Female who presents with a chief complaint of     HPI:  69 yo F pmhx of IDDM, HTN, HLD, Epilepsy on keppra/vimpat presents for one week of epigastric abdominal pain (worse after eating) and LUQ/LLQ and L flank pain. Patient states that she has been having elevated blood sugars and endorses new pruritic full body rash that began one week ago. Denies fever, chills, chest pain, shortness of breath, new allergens, nausea, vomiting, hematuria, dysuria, hematochezia, constipation, or any other symptoms at this time    Interval HPI:  Pt s/e  Reports x1 week of epigastric pain radiating to back but reports pain resolving, and reports pain minimal today  Denies N/V  Reports hx of choledocholithiasis requiring ERCP in 2021  Denies recent fevers. C/o constipation, reports no BM x2 days    Allergies:  penicillin (Headache)      Medications:  aspirin enteric coated 81 milliGRAM(s) Oral daily  dextrose 5%. 1000 milliLiter(s) IV Continuous <Continuous>  dextrose 5%. 1000 milliLiter(s) IV Continuous <Continuous>  dextrose 50% Injectable 25 Gram(s) IV Push once  dextrose 50% Injectable 12.5 Gram(s) IV Push once  dextrose 50% Injectable 25 Gram(s) IV Push once  dextrose Oral Gel 15 Gram(s) Oral once PRN  ezetimibe 10 milliGRAM(s) Oral daily  gabapentin 100 milliGRAM(s) Oral daily  glucagon  Injectable 1 milliGRAM(s) IntraMuscular once  heparin   Injectable 5000 Unit(s) SubCutaneous every 8 hours  insulin lispro (ADMELOG) corrective regimen sliding scale   SubCutaneous three times a day before meals  insulin lispro (ADMELOG) corrective regimen sliding scale   SubCutaneous at bedtime  ketotifen 0.025% Ophthalmic Solution 1 Drop(s) Both EYES two times a day  levETIRAcetam 500 milliGRAM(s) Oral two times a day  metoprolol succinate ER 12.5 milliGRAM(s) Oral daily  pantoprazole    Tablet 40 milliGRAM(s) Oral before breakfast  rosuvastatin 20 milliGRAM(s) Oral at bedtime  sodium chloride 0.9%. 1000 milliLiter(s) IV Continuous <Continuous>      PMHX/PSHX:  HLD (hyperlipidemia)    DM2 (diabetes mellitus, type 2)    DM (diabetes mellitus)    Epilepsy    HTN (hypertension)    Cholelithiasis    No significant past surgical history        Family history:  FH: type 1 diabetes (Grandparent)        Social History:     ROS:     General:  No wt loss, fevers, chills, night sweats, fatigue,   Eyes:  Good vision, no reported pain  ENT:  No sore throat, pain, runny nose, dysphagia  CV:  No pain, palpitations, hypo/hypertension  Resp:  No dyspnea, cough, tachypnea, wheezing  GI:  No pain, No nausea, No vomiting, No diarrhea, No constipation, No weight loss, No fever, No pruritis, No rectal bleeding, No tarry stools, No dysphagia,  :  No pain, bleeding, incontinence, nocturia  Muscle:  No pain, weakness  Neuro:  No weakness, tingling, memory problems  Psych:  No fatigue, insomnia, mood problems, depression  Endocrine:  No polyuria, polydipsia, cold/heat intolerance  Heme:  No petechiae, ecchymosis, easy bruisability  Skin:  No rash, tattoos, scars, edema      PHYSICAL EXAM:   Vital Signs:  Vital Signs Last 24 Hrs  T(C): 36.5 (20 May 2025 11:36), Max: 36.7 (19 May 2025 17:19)  T(F): 97.7 (20 May 2025 11:36), Max: 98.1 (19 May 2025 17:19)  HR: 87 (20 May 2025 11:36) (72 - 87)  BP: 123/72 (20 May 2025 11:36) (120/69 - 150/79)  BP(mean): --  RR: 18 (20 May 2025 11:36) (16 - 18)  SpO2: 95% (20 May 2025 11:36) (94% - 98%)    Parameters below as of 20 May 2025 11:36  Patient On (Oxygen Delivery Method): room air      Daily     Daily     GENERAL:  Appears stated age, well-groomed, well-nourished, no distress  HEENT:  NC/AT,  conjunctivae clear and pink, no thyromegaly, nodules, adenopathy, no JVD, sclera -anicteric  CHEST:  Full & symmetric excursion, no increased effort, breath sounds clear  HEART:  Regular rhythm, S1, S2, no murmur/rub/S3/S4, no abdominal bruit, no edema  ABDOMEN:  Soft, non-tender, non-distended, normoactive bowel sounds,  no masses ,no hepato-splenomegaly, no signs of chronic liver disease  EXTEREMITIES:  no cyanosis,clubbing or edema  SKIN:  No rash/erythema/ecchymoses/petechiae/wounds/abscess/warm/dry  NEURO:  Alert, oriented, no asterixis, no tremor, no encephalopathy    LABS:                        13.4   7.10  )-----------( 254      ( 20 May 2025 05:42 )             39.7     05-20    128[L]  |  90[L]  |  10  ----------------------------<  421[H]  4.3   |  17[L]  |  0.60    Ca    9.2      20 May 2025 05:42  Phos  2.6     05-20  Mg     1.9     05-20    TPro  7.0  /  Alb  4.0  /  TBili  0.5  /  DBili  0.2  /  AST  22  /  ALT  18  /  AlkPhos  97  05-20    LIVER FUNCTIONS - ( 20 May 2025 05:42 )  Alb: 4.0 g/dL / Pro: 7.0 g/dL / ALK PHOS: 97 U/L / ALT: 18 U/L / AST: 22 U/L / GGT: x             Urinalysis Basic - ( 20 May 2025 05:42 )    Color: x / Appearance: x / SG: x / pH: x  Gluc: 421 mg/dL / Ketone: x  / Bili: x / Urobili: x   Blood: x / Protein: x / Nitrite: x   Leuk Esterase: x / RBC: x / WBC x   Sq Epi: x / Non Sq Epi: x / Bacteria: x          Imaging:  < from: CT Abdomen and Pelvis w/ IV Cont (05.19.25 @ 08:58) >    ACC: [id number] EXAM:  CT ABDOMEN AND PELVIS IC   ORDERED BY:  JUSTINA BUSCH     PROCEDURE DATE:  05/19/2025          INTERPRETATION:  CLINICAL INFORMATION: Left upper quadrant and left lower   quadrant abdominal pain.    COMPARISON: CT abdomen andpelvis 12/24/2021. MRI abdomen 12/27/2021.    CONTRAST/COMPLICATIONS:  IV Contrast: Omnipaque 350  75 cc administered   25 cc discarded  Oral Contrast: NONE    PROCEDURE:  CT of the Abdomen and Pelvis was performed.  Sagittal and coronal reformats were performed.    FINDINGS:  LOWER CHEST: Mild bibasilar subsegmental atelectasis. Small patchy   groundglass opacities in the right lower lobe.    LIVER: Within normal limits.  BILE DUCTS: Hyperdensity in the distal common bile duct measuring 0.8 x   0.4 cm (602:31), suspicious for choledocholithiasis. The common bile   duct/common hepatic duct is dilated to 10 mm. Minimal central   intrahepatic biliary duct dilation.  GALLBLADDER: Cholelithiasis. Diffuse mild wall thickening. Fundal   adenomyomatosis.  SPLEEN: Within normal limits.  PANCREAS: Within normal limits.  ADRENALS: Within normal limits.  KIDNEYS/URETERS: No hydronephrosis. Subcentimeter hypodense foci in the   right kidney that are too small to characterize.    BLADDER: Distended.  REPRODUCTIVE ORGANS: Uterus and adnexa within normal limits.    BOWEL: Moderate stool burden. The rectum is distended with stool. No   bowel obstruction. Appendix is normal.  PERITONEUM/RETROPERITONEUM: Small amount of free peritoneal fluid in the   pelvis, similar to 12/24/2021.  VESSELS: Atherosclerotic changes. Asymmetric prominence of the left   parauterine veins and prominence of the left ovarian vein measuring up to   6 mm in caliber.  LYMPH NODES: No lymphadenopathy.  ABDOMINAL WALL: Withinnormal limits.  BONES: Degenerative changes.    IMPRESSION:  *  Hyperdensity in the distal common bile duct measuring 0.8 cm,   suspicious for choledocholithiasis. Biliary duct dilation, the CBD/CHD   measuring 10 mm in caliber.  *  Cholelithiasis. Mild diffuse gallbladder wall thickening. Suggest   right upper quadrant ultrasound or HIDA scan to assess for acute   cholecystitis.  *  Small amount of pelvic ascites, similar in comparison to 12/24/2021.  *  Nonspecific mild asymmetric prominence ofthe left parauterine veins   and left ovarian vein, findings that can be seen in the clinical setting   of pelvic congestion syndrome. Correlate clinically.  *  Patchy groundglass opacities in the right lower lobe of unclear   etiology.      < end of copied text >      < from: US Abdomen Upper Quadrant Right (05.19.25 @ 12:03) >    ACC: [id number] EXAM:  US ABDOMEN RT UPR QUADRANT   ORDERED BY:  JUSTINA BUSCH     PROCEDURE DATE:  05/19/2025          INTERPRETATION:  CLINICAL INFORMATION: Abdominal pain    COMPARISON: CT dated 5/19/2025    TECHNIQUE: Sonography of the right upper quadrant.    FINDINGS:  Liver: Within normal limits.  Bile ducts: Common bile duct measures 9 mm. 8 mm stone within the distal   common bile duct.  Gallbladder: Gallstones. Mild gallbladder wall thickening. Negative   sonographic Mar however, the patient has received pain medication.  Pancreas: Visualized portions are within normal limits.  Right kidney: 10.1 cm. No hydronephrosis.  Ascites: None.  IVC: Visualized portions are within normal limits.    IMPRESSION:    Dilation of the common bile duct with a distal 8 mm stone.    Gallstones with mild gallbladder wall thickening, indeterminate for acute   cholecystitis. If clinically concerned, a HIDA scan could be performed   for further evaluation.        < end of copied text >           Chief Complaint:  Patient is a 70y old  Female who presents with a chief complaint of     HPI:  69 yo F pmhx of IDDM, HTN, HLD, Epilepsy on keppra/vimpat presents for one week of epigastric abdominal pain (worse after eating) and LUQ/LLQ and L flank pain. Patient states that she has been having elevated blood sugars and endorses new pruritic full body rash that began one week ago. Denies fever, chills, chest pain, shortness of breath, new allergens, nausea, vomiting, hematuria, dysuria, hematochezia, constipation, or any other symptoms at this time    Interval HPI:  Pt s/e  Reports x1 week of epigastric pain radiating to back but reports pain resolving, and reports pain minimal today  Denies N/V  Reports hx of choledocholithiasis requiring ERCP in 2021  Denies recent fevers. C/o constipation, reports no BM x2 days    Allergies:  penicillin (Headache)      Medications:  aspirin enteric coated 81 milliGRAM(s) Oral daily  dextrose 5%. 1000 milliLiter(s) IV Continuous <Continuous>  dextrose 5%. 1000 milliLiter(s) IV Continuous <Continuous>  dextrose 50% Injectable 25 Gram(s) IV Push once  dextrose 50% Injectable 12.5 Gram(s) IV Push once  dextrose 50% Injectable 25 Gram(s) IV Push once  dextrose Oral Gel 15 Gram(s) Oral once PRN  ezetimibe 10 milliGRAM(s) Oral daily  gabapentin 100 milliGRAM(s) Oral daily  glucagon  Injectable 1 milliGRAM(s) IntraMuscular once  heparin   Injectable 5000 Unit(s) SubCutaneous every 8 hours  insulin lispro (ADMELOG) corrective regimen sliding scale   SubCutaneous three times a day before meals  insulin lispro (ADMELOG) corrective regimen sliding scale   SubCutaneous at bedtime  ketotifen 0.025% Ophthalmic Solution 1 Drop(s) Both EYES two times a day  levETIRAcetam 500 milliGRAM(s) Oral two times a day  metoprolol succinate ER 12.5 milliGRAM(s) Oral daily  pantoprazole    Tablet 40 milliGRAM(s) Oral before breakfast  rosuvastatin 20 milliGRAM(s) Oral at bedtime  sodium chloride 0.9%. 1000 milliLiter(s) IV Continuous <Continuous>      PMHX/PSHX:  HLD (hyperlipidemia)    DM2 (diabetes mellitus, type 2)    DM (diabetes mellitus)    Epilepsy    HTN (hypertension)    Cholelithiasis    No significant past surgical history        Family history:  FH: type 1 diabetes (Grandparent)        Social History:     ROS:     General:  No wt loss, fevers, chills, night sweats, fatigue,   Eyes:  Good vision, no reported pain  ENT:  No sore throat, pain, runny nose, dysphagia  CV:  No pain, palpitations, hypo/hypertension  Resp:  No dyspnea, cough, tachypnea, wheezing  GI:  +epigastric pain. No nausea, No vomiting, No diarrhea, No constipation, No weight loss, No fever, No pruritis, No rectal bleeding, No tarry stools, No dysphagia,  :  No pain, bleeding, incontinence, nocturia  Muscle:  No pain, weakness  Neuro:  No weakness, tingling, memory problems  Psych:  No fatigue, insomnia, mood problems, depression  Endocrine:  No polyuria, polydipsia, cold/heat intolerance  Heme:  No petechiae, ecchymosis, easy bruisability  Skin:  No rash, tattoos, scars, edema      PHYSICAL EXAM:   Vital Signs:  Vital Signs Last 24 Hrs  T(C): 36.5 (20 May 2025 11:36), Max: 36.7 (19 May 2025 17:19)  T(F): 97.7 (20 May 2025 11:36), Max: 98.1 (19 May 2025 17:19)  HR: 87 (20 May 2025 11:36) (72 - 87)  BP: 123/72 (20 May 2025 11:36) (120/69 - 150/79)  BP(mean): --  RR: 18 (20 May 2025 11:36) (16 - 18)  SpO2: 95% (20 May 2025 11:36) (94% - 98%)    Parameters below as of 20 May 2025 11:36  Patient On (Oxygen Delivery Method): room air      Daily     Daily     GENERAL:  Appears stated age, well-groomed, well-nourished, no distress  HEENT:  NC/AT,  conjunctivae clear and pink, no thyromegaly, nodules, adenopathy, no JVD, sclera -anicteric  CHEST:  Full & symmetric excursion, no increased effort, breath sounds clear  HEART:  Regular rhythm, S1, S2, no murmur/rub/S3/S4, no abdominal bruit, no edema  ABDOMEN:  Soft, non-tender, non-distended, normoactive bowel sounds,  no masses ,no hepato-splenomegaly, no signs of chronic liver disease  EXTEREMITIES:  no cyanosis,clubbing or edema  SKIN:  No rash/erythema/ecchymoses/petechiae/wounds/abscess/warm/dry  NEURO:  Alert, oriented, no asterixis, no tremor, no encephalopathy    LABS:                        13.4   7.10  )-----------( 254      ( 20 May 2025 05:42 )             39.7     05-20    128[L]  |  90[L]  |  10  ----------------------------<  421[H]  4.3   |  17[L]  |  0.60    Ca    9.2      20 May 2025 05:42  Phos  2.6     05-20  Mg     1.9     05-20    TPro  7.0  /  Alb  4.0  /  TBili  0.5  /  DBili  0.2  /  AST  22  /  ALT  18  /  AlkPhos  97  05-20    LIVER FUNCTIONS - ( 20 May 2025 05:42 )  Alb: 4.0 g/dL / Pro: 7.0 g/dL / ALK PHOS: 97 U/L / ALT: 18 U/L / AST: 22 U/L / GGT: x             Urinalysis Basic - ( 20 May 2025 05:42 )    Color: x / Appearance: x / SG: x / pH: x  Gluc: 421 mg/dL / Ketone: x  / Bili: x / Urobili: x   Blood: x / Protein: x / Nitrite: x   Leuk Esterase: x / RBC: x / WBC x   Sq Epi: x / Non Sq Epi: x / Bacteria: x          Imaging:  < from: CT Abdomen and Pelvis w/ IV Cont (05.19.25 @ 08:58) >    ACC: [id number] EXAM:  CT ABDOMEN AND PELVIS IC   ORDERED BY:  JUSTINA BUSCH     PROCEDURE DATE:  05/19/2025          INTERPRETATION:  CLINICAL INFORMATION: Left upper quadrant and left lower   quadrant abdominal pain.    COMPARISON: CT abdomen andpelvis 12/24/2021. MRI abdomen 12/27/2021.    CONTRAST/COMPLICATIONS:  IV Contrast: Omnipaque 350  75 cc administered   25 cc discarded  Oral Contrast: NONE    PROCEDURE:  CT of the Abdomen and Pelvis was performed.  Sagittal and coronal reformats were performed.    FINDINGS:  LOWER CHEST: Mild bibasilar subsegmental atelectasis. Small patchy   groundglass opacities in the right lower lobe.    LIVER: Within normal limits.  BILE DUCTS: Hyperdensity in the distal common bile duct measuring 0.8 x   0.4 cm (602:31), suspicious for choledocholithiasis. The common bile   duct/common hepatic duct is dilated to 10 mm. Minimal central   intrahepatic biliary duct dilation.  GALLBLADDER: Cholelithiasis. Diffuse mild wall thickening. Fundal   adenomyomatosis.  SPLEEN: Within normal limits.  PANCREAS: Within normal limits.  ADRENALS: Within normal limits.  KIDNEYS/URETERS: No hydronephrosis. Subcentimeter hypodense foci in the   right kidney that are too small to characterize.    BLADDER: Distended.  REPRODUCTIVE ORGANS: Uterus and adnexa within normal limits.    BOWEL: Moderate stool burden. The rectum is distended with stool. No   bowel obstruction. Appendix is normal.  PERITONEUM/RETROPERITONEUM: Small amount of free peritoneal fluid in the   pelvis, similar to 12/24/2021.  VESSELS: Atherosclerotic changes. Asymmetric prominence of the left   parauterine veins and prominence of the left ovarian vein measuring up to   6 mm in caliber.  LYMPH NODES: No lymphadenopathy.  ABDOMINAL WALL: Withinnormal limits.  BONES: Degenerative changes.    IMPRESSION:  *  Hyperdensity in the distal common bile duct measuring 0.8 cm,   suspicious for choledocholithiasis. Biliary duct dilation, the CBD/CHD   measuring 10 mm in caliber.  *  Cholelithiasis. Mild diffuse gallbladder wall thickening. Suggest   right upper quadrant ultrasound or HIDA scan to assess for acute   cholecystitis.  *  Small amount of pelvic ascites, similar in comparison to 12/24/2021.  *  Nonspecific mild asymmetric prominence ofthe left parauterine veins   and left ovarian vein, findings that can be seen in the clinical setting   of pelvic congestion syndrome. Correlate clinically.  *  Patchy groundglass opacities in the right lower lobe of unclear   etiology.      < end of copied text >      < from: US Abdomen Upper Quadrant Right (05.19.25 @ 12:03) >    ACC: [id number] EXAM:  US ABDOMEN RT UPR QUADRANT   ORDERED BY:  JUSTINA BUSCH     PROCEDURE DATE:  05/19/2025          INTERPRETATION:  CLINICAL INFORMATION: Abdominal pain    COMPARISON: CT dated 5/19/2025    TECHNIQUE: Sonography of the right upper quadrant.    FINDINGS:  Liver: Within normal limits.  Bile ducts: Common bile duct measures 9 mm. 8 mm stone within the distal   common bile duct.  Gallbladder: Gallstones. Mild gallbladder wall thickening. Negative   sonographic Mar however, the patient has received pain medication.  Pancreas: Visualized portions are within normal limits.  Right kidney: 10.1 cm. No hydronephrosis.  Ascites: None.  IVC: Visualized portions are within normal limits.    IMPRESSION:    Dilation of the common bile duct with a distal 8 mm stone.    Gallstones with mild gallbladder wall thickening, indeterminate for acute   cholecystitis. If clinically concerned, a HIDA scan could be performed   for further evaluation.        < end of copied text >

## 2025-05-21 LAB
ANION GAP SERPL CALC-SCNC: 22 MMOL/L — HIGH (ref 5–17)
BUN SERPL-MCNC: 12 MG/DL — SIGNIFICANT CHANGE UP (ref 7–23)
CALCIUM SERPL-MCNC: 8.9 MG/DL — SIGNIFICANT CHANGE UP (ref 8.4–10.5)
CHLORIDE SERPL-SCNC: 94 MMOL/L — LOW (ref 96–108)
CO2 SERPL-SCNC: 14 MMOL/L — LOW (ref 22–31)
CREAT ?TM UR-MCNC: 27 MG/DL — SIGNIFICANT CHANGE UP
CREAT SERPL-MCNC: 0.57 MG/DL — SIGNIFICANT CHANGE UP (ref 0.5–1.3)
EGFR: 98 ML/MIN/1.73M2 — SIGNIFICANT CHANGE UP
EGFR: 98 ML/MIN/1.73M2 — SIGNIFICANT CHANGE UP
GLUCOSE BLDC GLUCOMTR-MCNC: 156 MG/DL — HIGH (ref 70–99)
GLUCOSE BLDC GLUCOMTR-MCNC: 180 MG/DL — HIGH (ref 70–99)
GLUCOSE BLDC GLUCOMTR-MCNC: 286 MG/DL — HIGH (ref 70–99)
GLUCOSE BLDC GLUCOMTR-MCNC: 332 MG/DL — HIGH (ref 70–99)
GLUCOSE SERPL-MCNC: 352 MG/DL — HIGH (ref 70–99)
HCT VFR BLD CALC: 40.2 % — SIGNIFICANT CHANGE UP (ref 34.5–45)
HGB BLD-MCNC: 13 G/DL — SIGNIFICANT CHANGE UP (ref 11.5–15.5)
MCHC RBC-ENTMCNC: 27.4 PG — SIGNIFICANT CHANGE UP (ref 27–34)
MCHC RBC-ENTMCNC: 32.3 G/DL — SIGNIFICANT CHANGE UP (ref 32–36)
MCV RBC AUTO: 84.8 FL — SIGNIFICANT CHANGE UP (ref 80–100)
NRBC BLD AUTO-RTO: 0 /100 WBCS — SIGNIFICANT CHANGE UP (ref 0–0)
OSMOLALITY UR: 555 MOS/KG — SIGNIFICANT CHANGE UP (ref 300–900)
PLATELET # BLD AUTO: 252 K/UL — SIGNIFICANT CHANGE UP (ref 150–400)
POTASSIUM SERPL-MCNC: 4.2 MMOL/L — SIGNIFICANT CHANGE UP (ref 3.5–5.3)
POTASSIUM SERPL-SCNC: 4.2 MMOL/L — SIGNIFICANT CHANGE UP (ref 3.5–5.3)
RBC # BLD: 4.74 M/UL — SIGNIFICANT CHANGE UP (ref 3.8–5.2)
RBC # FLD: 13.7 % — SIGNIFICANT CHANGE UP (ref 10.3–14.5)
SODIUM SERPL-SCNC: 130 MMOL/L — LOW (ref 135–145)
SODIUM UR-SCNC: 111 MMOL/L — SIGNIFICANT CHANGE UP
TSH SERPL-MCNC: 1.66 UIU/ML — SIGNIFICANT CHANGE UP (ref 0.27–4.2)
WBC # BLD: 7.74 K/UL — SIGNIFICANT CHANGE UP (ref 3.8–10.5)
WBC # FLD AUTO: 7.74 K/UL — SIGNIFICANT CHANGE UP (ref 3.8–10.5)

## 2025-05-21 DEVICE — CATH KT BLN DIL RX OLB 8MMX4CM: Type: IMPLANTABLE DEVICE | Status: FUNCTIONAL

## 2025-05-21 DEVICE — BLLN EXTRACT FUSION QUATRO 8.5 10 12 15MM: Type: IMPLANTABLE DEVICE | Status: FUNCTIONAL

## 2025-05-21 DEVICE — AUTOTOME CANNULATING SPHINCTEROTOME RX 44 20MM: Type: IMPLANTABLE DEVICE | Status: FUNCTIONAL

## 2025-05-21 DEVICE — HYDRATOME 44: Type: IMPLANTABLE DEVICE | Status: FUNCTIONAL

## 2025-05-21 DEVICE — CATH BLLN EXTRACT DIST GUIDE WIRE 15MM 3LUM: Type: IMPLANTABLE DEVICE | Status: FUNCTIONAL

## 2025-05-21 RX ORDER — ACETAMINOPHEN 500 MG/5ML
600 LIQUID (ML) ORAL ONCE
Refills: 0 | Status: COMPLETED | OUTPATIENT
Start: 2025-05-21 | End: 2025-05-21

## 2025-05-21 RX ORDER — CIPROFLOXACIN HCL 250 MG
400 TABLET ORAL ONCE
Refills: 0 | Status: DISCONTINUED | OUTPATIENT
Start: 2025-05-21 | End: 2025-05-24

## 2025-05-21 RX ORDER — INDOMETHACIN 50 MG
100 CAPSULE ORAL ONCE
Refills: 0 | Status: DISCONTINUED | OUTPATIENT
Start: 2025-05-21 | End: 2025-05-24

## 2025-05-21 RX ORDER — DEXTROSE 50 % IN WATER 50 %
15 SYRINGE (ML) INTRAVENOUS ONCE
Refills: 0 | Status: DISCONTINUED | OUTPATIENT
Start: 2025-05-21 | End: 2025-05-24

## 2025-05-21 RX ORDER — INSULIN GLARGINE-YFGN 100 [IU]/ML
10 INJECTION, SOLUTION SUBCUTANEOUS AT BEDTIME
Refills: 0 | Status: DISCONTINUED | OUTPATIENT
Start: 2025-05-21 | End: 2025-05-23

## 2025-05-21 RX ORDER — INSULIN LISPRO 100 U/ML
5 INJECTION, SOLUTION INTRAVENOUS; SUBCUTANEOUS
Refills: 0 | Status: DISCONTINUED | OUTPATIENT
Start: 2025-05-21 | End: 2025-05-24

## 2025-05-21 RX ORDER — SODIUM BICARBONATE 1 MEQ/ML
0.14 SYRINGE (ML) INTRAVENOUS
Qty: 75 | Refills: 0 | Status: DISCONTINUED | OUTPATIENT
Start: 2025-05-21 | End: 2025-05-22

## 2025-05-21 RX ADMIN — INSULIN LISPRO 1: 100 INJECTION, SOLUTION INTRAVENOUS; SUBCUTANEOUS at 17:16

## 2025-05-21 RX ADMIN — INSULIN LISPRO 4: 100 INJECTION, SOLUTION INTRAVENOUS; SUBCUTANEOUS at 08:22

## 2025-05-21 RX ADMIN — KETOTIFEN FUMARATE 1 DROP(S): 0.35 SOLUTION/ DROPS OPHTHALMIC at 17:17

## 2025-05-21 RX ADMIN — INSULIN LISPRO 1: 100 INJECTION, SOLUTION INTRAVENOUS; SUBCUTANEOUS at 12:27

## 2025-05-21 RX ADMIN — HEPARIN SODIUM 5000 UNIT(S): 1000 INJECTION INTRAVENOUS; SUBCUTANEOUS at 05:19

## 2025-05-21 RX ADMIN — KETOTIFEN FUMARATE 1 DROP(S): 0.35 SOLUTION/ DROPS OPHTHALMIC at 05:19

## 2025-05-21 RX ADMIN — INSULIN LISPRO 5 UNIT(S): 100 INJECTION, SOLUTION INTRAVENOUS; SUBCUTANEOUS at 17:17

## 2025-05-21 RX ADMIN — LEVETIRACETAM 500 MILLIGRAM(S): 10 INJECTION, SOLUTION INTRAVENOUS at 17:01

## 2025-05-21 RX ADMIN — Medication 600 MILLIGRAM(S): at 22:00

## 2025-05-21 RX ADMIN — HEPARIN SODIUM 5000 UNIT(S): 1000 INJECTION INTRAVENOUS; SUBCUTANEOUS at 21:24

## 2025-05-21 RX ADMIN — Medication 40 MILLIGRAM(S): at 05:19

## 2025-05-21 RX ADMIN — Medication 240 MILLIGRAM(S): at 21:48

## 2025-05-21 RX ADMIN — EZETIMIBE 10 MILLIGRAM(S): 10 TABLET ORAL at 16:29

## 2025-05-21 RX ADMIN — METOPROLOL SUCCINATE 12.5 MILLIGRAM(S): 50 TABLET, EXTENDED RELEASE ORAL at 05:19

## 2025-05-21 RX ADMIN — INSULIN LISPRO 1: 100 INJECTION, SOLUTION INTRAVENOUS; SUBCUTANEOUS at 21:24

## 2025-05-21 RX ADMIN — Medication 81 MILLIGRAM(S): at 16:29

## 2025-05-21 RX ADMIN — Medication 75 MEQ/KG/HR: at 18:01

## 2025-05-21 RX ADMIN — LEVETIRACETAM 500 MILLIGRAM(S): 10 INJECTION, SOLUTION INTRAVENOUS at 05:19

## 2025-05-21 RX ADMIN — Medication 240 MILLIGRAM(S): at 06:51

## 2025-05-21 RX ADMIN — INSULIN GLARGINE-YFGN 10 UNIT(S): 100 INJECTION, SOLUTION SUBCUTANEOUS at 21:24

## 2025-05-21 RX ADMIN — ROSUVASTATIN CALCIUM 20 MILLIGRAM(S): 20 TABLET, FILM COATED ORAL at 21:24

## 2025-05-21 RX ADMIN — Medication 75 MEQ/KG/HR: at 16:22

## 2025-05-21 RX ADMIN — GABAPENTIN 100 MILLIGRAM(S): 400 CAPSULE ORAL at 16:28

## 2025-05-21 NOTE — DIETITIAN INITIAL EVALUATION ADULT - ORAL INTAKE PTA/DIET HISTORY
Patient states prior to onset of her acute symptoms, reports she normally eats well PTA w/ no issues. Denied chewing/swallowing impairment or nausea/vomiting PTA. Patient reports she watches carbohydrate intake at home for DM management. Uses a CGM and watches her BG trends, states mostly can be in the 130s range on average, but can fluctuate expectedly depending on what she eats/timing between meals/etc. Reports she has been having a bit higher than normal BG readings in the AM at home which she wants to address with her endocrinologist. Patient endorses the pain she has currently for this admission is similar to when she had a reported gallstone in 2021.

## 2025-05-21 NOTE — DIETITIAN INITIAL EVALUATION ADULT - PERTINENT LABORATORY DATA
05-21    130[L]  |  94[L]  |  12  ----------------------------<  352[H]  4.2   |  14[L]  |  0.57    Ca    8.9      21 May 2025 07:19  Phos  2.6     05-20  Mg     1.9     05-20    TPro  7.0  /  Alb  4.0  /  TBili  0.5  /  DBili  0.2  /  AST  22  /  ALT  18  /  AlkPhos  97  05-20  POCT Blood Glucose.: 332 mg/dL (05-21-25 @ 08:06)  A1C with Estimated Average Glucose Result: 8.7 % (05-20-25 @ 05:48)

## 2025-05-21 NOTE — PROGRESS NOTE ADULT - ASSESSMENT
69 yo F pmhx of IDDM, HTN, HLD, Epilepsy on keppra/vimpat presents for one week of epigastric abdominal pain (worse after eating) and LUQ/LLQ and L flank pain. Patient states that she has been having elevated blood sugars and endorses new pruritic full body rash that began one week ago. Denies fever, chills, chest pain, shortness of breath, new allergens, nausea, vomiting, hematuria, dysuria, hematochezia, constipation, or any other symptoms at this time    cholecocholithasis  - MRCP done  - ERCP today    hyponatremia improved  - urine osms and lytes  - nephrology consult following  - fluid restrict to 1.5 L per day    seizure d/o  - c/w keppra    neuropathy  - c/w gabapentin    diabetes  - fs qid  - hgb a1c 8.7  - insulin ss  - lantus 10 units qhs started  - endo consult    HLD  - c/w statin  - c/w zetia    dvt px  - sq heparin

## 2025-05-21 NOTE — CONSULT NOTE ADULT - SUBJECTIVE AND OBJECTIVE BOX
Surgery Consult Note    HPI:  69 yo F pmhx of IDDM, HTN, HLD, Epilepsy on keppra/vimpat presents for one week of epigastric abdominal pain (worse after eating) and LUQ/LLQ and L flank pain on May 19. 2025. In the ED US obtained showed Dilation of the common bile duct with a distal 8 mm stone. Gallstones with mild gallbladder wall thickening, indeterminate for acute cholecystitis. CT scan showing   Hyperdensity in the distal common bile duct measuring 0.8 cm, suspicious for choledocholithiasis. Biliary duct dilation, the CBD/CHD measuring 10 mm in caliber as well as Cholelithiasis. Mild diffuse gallbladder wall thickening. Labs WNL. Patient underwent MRCP on 05/20/25 which showed Cholelithiasis and choledocholithiasis. Biliary ductal dilatation with 8 mm CBD. Patient underwent ERCP on 05/21/25 - Choledocholithiasis was found. Complete removal was accomplished by biliary sphincterotomy and balloon extraction. A biliary sphincterotomy was performed. General Surgery was consulted for interval CCY. Patient currently AFVSS. Physical exam notable for tenderness to palpation in the RUQ. Denies past surgical history.       PAST MEDICAL & SURGICAL HISTORY:  HLD (hyperlipidemia)      DM (diabetes mellitus)  Type 1      Epilepsy      HTN (hypertension)      Cholelithiasis      No significant past surgical history        Allergies    penicillin (Headache)    Intolerances      Home Medications:  alendronate 70 mg oral tablet: 1 tab(s) orally once a week (19 May 2025 14:57)  aspirin 81 mg oral delayed release tablet: 1 tab(s) orally once a day (19 May 2025 14:57)  azelastine 0.05% ophthalmic solution: 1 drop(s) in each affected eye 2 times a day (19 May 2025 14:59)  Baqsimi Two Pack 3 mg nasal powder: 3 milligram(s) intranasally as needed (19 May 2025 14:59)  ezetimibe 10 mg oral tablet: 1 tab(s) orally once a day (in the evening) (19 May 2025 14:59)  gabapentin 100 mg oral capsule: 1 cap(s) orally once a day (19 May 2025 14:59)  Lantus Solostar Pen 100 units/mL subcutaneous solution: 2 unit(s) subcutaneous once a day (in the evening) (19 May 2025 14:59)  levETIRAcetam 500 mg oral tablet: 1 tab(s) orally every 12 hours (19 May 2025 14:59)  metoprolol succinate 25 mg oral tablet, extended release: 0.5 tab(s) orally once a day (in the morning) (19 May 2025 14:59)  NovoLOG FlexPen 100 units/mL injectable solution: 8 unit(s) injectable 3 times a day (before meals) (19 May 2025 14:59)  omeprazole 20 mg oral delayed release capsule: 1 cap(s) orally 2 times a day as needed (19 May 2025 14:59)  rosuvastatin 20 mg oral tablet: 1 tab(s) orally once a day (in the morning) (19 May 2025 14:59)  Valtoco 10 mg Dose nasal spray: 1 spray(s) intranasally as needed for when seizure is expected (19 May 2025 14:59)  Vitamin B12 tablet: 1 tablet orally once a day (19 May 2025 14:59)    MEDICATIONS  (STANDING):  acetaminophen   IVPB .. 600 milliGRAM(s) IV Intermittent once  aspirin enteric coated 81 milliGRAM(s) Oral daily  ciprofloxacin   IVPB 400 milliGRAM(s) IV Intermittent once  dextrose 5%. 1000 milliLiter(s) (50 mL/Hr) IV Continuous <Continuous>  dextrose 5%. 1000 milliLiter(s) (100 mL/Hr) IV Continuous <Continuous>  dextrose 50% Injectable 25 Gram(s) IV Push once  dextrose 50% Injectable 12.5 Gram(s) IV Push once  dextrose 50% Injectable 25 Gram(s) IV Push once  ezetimibe 10 milliGRAM(s) Oral daily  gabapentin 100 milliGRAM(s) Oral daily  glucagon  Injectable 1 milliGRAM(s) IntraMuscular once  heparin   Injectable 5000 Unit(s) SubCutaneous every 8 hours  indomethacin Suppository 100 milliGRAM(s) Rectal once  insulin glargine Injectable (LANTUS) 10 Unit(s) SubCutaneous at bedtime  insulin lispro (ADMELOG) corrective regimen sliding scale   SubCutaneous three times a day before meals  insulin lispro (ADMELOG) corrective regimen sliding scale   SubCutaneous at bedtime  insulin lispro Injectable (ADMELOG) 5 Unit(s) SubCutaneous three times a day before meals  ketotifen 0.025% Ophthalmic Solution 1 Drop(s) Both EYES two times a day  levETIRAcetam 500 milliGRAM(s) Oral two times a day  metoprolol succinate ER 12.5 milliGRAM(s) Oral daily  pantoprazole    Tablet 40 milliGRAM(s) Oral before breakfast  rosuvastatin 20 milliGRAM(s) Oral at bedtime  saline laxative (FLEET) Rectal Enema 1 Enema Rectal once  sodium bicarbonate  Infusion 0.141 mEq/kG/Hr (75 mL/Hr) IV Continuous <Continuous>      SOCIAL HISTORY:  FAMILY HISTORY:  FH: type 1 diabetes (Grandparent)        ___________________________________________  OBJECTIVE:  Vital Signs Last 24 Hrs  T(C): 36.5 (21 May 2025 20:47), Max: 36.6 (21 May 2025 04:45)  T(F): 97.7 (21 May 2025 20:47), Max: 97.8 (21 May 2025 04:45)  HR: 76 (21 May 2025 20:47) (67 - 85)  BP: 133/73 (21 May 2025 20:47) (125/70 - 152/72)  BP(mean): --  RR: 18 (21 May 2025 20:47) (12 - 18)  SpO2: 96% (21 May 2025 20:47) (96% - 100%)    Parameters below as of 21 May 2025 20:47  Patient On (Oxygen Delivery Method): room air    CAPILLARY BLOOD GLUCOSE      POCT Blood Glucose.: 286 mg/dL (21 May 2025 21:17)    I&O's Detail    20 May 2025 07:01  -  21 May 2025 07:00  --------------------------------------------------------  IN:    Oral Fluid: 300 mL  Total IN: 300 mL    OUT:  Total OUT: 0 mL    Total NET: 300 mL      21 May 2025 07:01  -  21 May 2025 21:37  --------------------------------------------------------  IN:    Sodium Bicarbonate: 150 mL  Total IN: 150 mL    OUT:    Oral Fluid: 0 mL  Total OUT: 0 mL    Total NET: 150 mL        General: Well developed, well nourished, NAD  Neuro: Alert and oriented, no focal deficits, moves all extremities spontaneously  HEENT: NCAT, EOMI, anicteric, mucosa moist  Respiratory: Airway patent, respirations unlabored  CVS: Regular rate and rhythm  Abdomen: Soft, nondistended, mild tenderness to palpation in the RUQ   Extremities: No edema, sensation and movement grossly intact  Skin: Warm, dry, appropriate color  ____________________________________________  LABS:  CBC Full  -  ( 21 May 2025 07:19 )  WBC Count : 7.74 K/uL  RBC Count : 4.74 M/uL  Hemoglobin : 13.0 g/dL  Hematocrit : 40.2 %  Platelet Count - Automated : 252 K/uL  Mean Cell Volume : 84.8 fl  Mean Cell Hemoglobin : 27.4 pg  Mean Cell Hemoglobin Concentration : 32.3 g/dL  Auto Neutrophil # : x  Auto Lymphocyte # : x  Auto Monocyte # : x  Auto Eosinophil # : x  Auto Basophil # : x  Auto Neutrophil % : x  Auto Lymphocyte % : x  Auto Monocyte % : x  Auto Eosinophil % : x  Auto Basophil % : x    05-21    130[L]  |  94[L]  |  12  ----------------------------<  352[H]  4.2   |  14[L]  |  0.57    Ca    8.9      21 May 2025 07:19  Phos  2.6     05-20  Mg     1.9     05-20    TPro  7.0  /  Alb  4.0  /  TBili  0.5  /  DBili  0.2  /  AST  22  /  ALT  18  /  AlkPhos  97  05-20    LIVER FUNCTIONS - ( 20 May 2025 05:42 )  Alb: 4.0 g/dL / Pro: 7.0 g/dL / ALK PHOS: 97 U/L / ALT: 18 U/L / AST: 22 U/L / GGT: x             Urinalysis Basic - ( 21 May 2025 07:19 )    Color: x / Appearance: x / SG: x / pH: x  Gluc: 352 mg/dL / Ketone: x  / Bili: x / Urobili: x   Blood: x / Protein: x / Nitrite: x   Leuk Esterase: x / RBC: x / WBC x   Sq Epi: x / Non Sq Epi: x / Bacteria: x    ____________________________________________  RADIOLOGY:  < from: CT Abdomen and Pelvis w/ IV Cont (05.19.25 @ 08:58) >  IMPRESSION:  *  Hyperdensity in the distal common bile duct measuring 0.8 cm,   suspicious for choledocholithiasis. Biliary duct dilation, the CBD/CHD   measuring 10 mm in caliber.  *  Cholelithiasis. Mild diffuse gallbladder wall thickening. Suggest   right upper quadrant ultrasound or HIDA scan to assess for acute   cholecystitis.  *  Small amount of pelvic ascites, similar in comparison to 12/24/2021.  *  Nonspecific mild asymmetric prominence ofthe left parauterine veins   and left ovarian vein, findings that can be seen in the clinical setting   of pelvic congestion syndrome. Correlate clinically.  *  Patchy groundglass opacities in the right lower lobe of unclear   etiology.    < end of copied text >

## 2025-05-21 NOTE — DIETITIAN INITIAL EVALUATION ADULT - ETIOLOGY
acute illnesses w/ reported ongoing <PO/ana 2/2 acute symptoms w/ abdominal pain precluding sufficient PO

## 2025-05-21 NOTE — CONSULT NOTE ADULT - ASSESSMENT
69 yo F pmhx of IDDM, HTN, HLD, Epilepsy on keppra/vimpat presents for one week of epigastric abdominal pain, found to have choledocholithiases now status post ERCP with removal and balloon extraction of stones. General Surgery was consulted for interval CCY.     Recommendations:   - Recommend CCY during this admission   - Please make patient NPO   - Pre op labs (CBC, BMP, Mg, Phos, Type and Screen, PTT, PT/INR)  - Patient and family wanted to further discuss cholecystectomy during this admission vs. follow up as outpatient  - Encouraged CCY during this admission given choledocholithiases and patient acute cholecystitis   - General Surgery will follow-up in AM to discuss goals     ACS   71 yo F pmhx of IDDM, HTN, HLD, Epilepsy on keppra/vimpat presents for one week of epigastric abdominal pain, found to have choledocholithiases now status post ERCP with removal and balloon extraction of stones. General Surgery was consulted for interval CCY.     Recommendations:   - Recommend CCY during this admission   - Patient and family wanted to further discuss cholecystectomy during this admission vs. follow up as outpatient  - Encouraged CCY during this admission given choledocholithiases and possible acute cholecystitis given CT findings and physical exam   - IV antibiotics    - General Surgery will follow-up in AM to discuss goals     ACS

## 2025-05-21 NOTE — DIETITIAN INITIAL EVALUATION ADULT - PERSON TAUGHT/METHOD
adequate caloric/protein intake w/ food sources reviewed, strategies to increase and maintain sufficient PO intake, consistent carbohydrate/DM diet guidelines, food preferences, oral nutrition supplementation, all questions were answered/verbal instruction/teach back - (Patient repeats in own words)/patient instructed

## 2025-05-21 NOTE — CONSULT NOTE ADULT - ATTENDING COMMENTS
Pt seen on 3C, sleeping. All relevant imaging and labs reviewed. Choledocholithiasis s/p ERCP. Will discuss options today for next steps, possible lap west.

## 2025-05-21 NOTE — PROGRESS NOTE ADULT - SUBJECTIVE AND OBJECTIVE BOX
[facility]    Dr. Jose Flannery     Office [phone] (9 am to 5 pm)  Service: [phone] (5pm to 9am)  Also Available on TEAMS      RENAL PROGRESS NOTE: DATE OF SERVICE 05-21-25 @ 11:06    Patient is a 70y old  Female who presents with a chief complaint of     Patient seen and examined at bedside. No chest pain/sob    VITALS:  T(F): 97.8 (05-21-25 @ 04:45), Max: 98.1 (05-20-25 @ 20:00)  HR: 85 (05-21-25 @ 04:45)  BP: 125/70 (05-21-25 @ 04:45)  RR: 18 (05-21-25 @ 04:45)  SpO2: 97% (05-21-25 @ 04:45)  Wt(kg): --    05-20 @ 07:01  -  05-21 @ 07:00  --------------------------------------------------------  IN: 300 mL / OUT: 0 mL / NET: 300 mL          PHYSICAL EXAM:  Constitutional: NAD  Neck: No JVD  Respiratory: CTAB, no wheezes, rales or rhonchi  Cardiovascular: S1, S2, RRR  Gastrointestinal: BS+, soft, NT/ND  Extremities: No peripheral edema    Hospital Medications:   MEDICATIONS  (STANDING):  aspirin enteric coated 81 milliGRAM(s) Oral daily  dextrose 5%. 1000 milliLiter(s) (50 mL/Hr) IV Continuous <Continuous>  dextrose 5%. 1000 milliLiter(s) (100 mL/Hr) IV Continuous <Continuous>  dextrose 50% Injectable 25 Gram(s) IV Push once  dextrose 50% Injectable 12.5 Gram(s) IV Push once  dextrose 50% Injectable 25 Gram(s) IV Push once  ezetimibe 10 milliGRAM(s) Oral daily  gabapentin 100 milliGRAM(s) Oral daily  glucagon  Injectable 1 milliGRAM(s) IntraMuscular once  heparin   Injectable 5000 Unit(s) SubCutaneous every 8 hours  insulin glargine Injectable (LANTUS) 10 Unit(s) SubCutaneous at bedtime  insulin lispro (ADMELOG) corrective regimen sliding scale   SubCutaneous three times a day before meals  insulin lispro (ADMELOG) corrective regimen sliding scale   SubCutaneous at bedtime  insulin lispro Injectable (ADMELOG) 5 Unit(s) SubCutaneous three times a day before meals  ketotifen 0.025% Ophthalmic Solution 1 Drop(s) Both EYES two times a day  levETIRAcetam 500 milliGRAM(s) Oral two times a day  metoprolol succinate ER 12.5 milliGRAM(s) Oral daily  pantoprazole    Tablet 40 milliGRAM(s) Oral before breakfast  rosuvastatin 20 milliGRAM(s) Oral at bedtime  saline laxative (FLEET) Rectal Enema 1 Enema Rectal once  sodium chloride 0.9%. 1000 milliLiter(s) (75 mL/Hr) IV Continuous <Continuous>      LABS:  05-21    130[L]  |  94[L]  |  12  ----------------------------<  352[H]  4.2   |  14[L]  |  0.57    Ca    8.9      21 May 2025 07:19  Phos  2.6     05-20  Mg     1.9     05-20    TPro  7.0  /  Alb  4.0  /  TBili  0.5  /  DBili  0.2  /  AST  22  /  ALT  18  /  AlkPhos  97  05-20    Creatinine Trend: 0.57 <--, 0.60 <--, 0.57 <--                                13.0   7.74  )-----------( 252      ( 21 May 2025 07:19 )             40.2     Urine Studies:  Urinalysis - [05-21-25 @ 07:19]      Color  / Appearance  / SG  / pH       Gluc 352 / Ketone   / Bili  / Urobili        Blood  / Protein  / Leuk Est  / Nitrite       RBC  / WBC  / Hyaline  / Gran  / Sq Epi  / Non Sq Epi  / Bacteria       TSH 1.66      [05-21-25 @ 07:21]        RADIOLOGY & ADDITIONAL STUDIES:

## 2025-05-21 NOTE — DIETITIAN INITIAL EVALUATION ADULT - NUTRITIONGOAL OUTCOME1
- patient will consume >75% of meals during hospital admission to help adequately meet nutritional needs w/ good tolerance upon further diet advancement when deemed medically feasible

## 2025-05-21 NOTE — DIETITIAN INITIAL EVALUATION ADULT - OTHER INFO
NKFA per patient. Patient reports her UBW is 91lbs w/ no fluctuations prior to onset of acute symptoms (states historically has always been on more slender side with BW), endorses a 4lbs unintentional weight loss x1 week during onset of acute symptoms, clinically significant for time frame. Only weights by [facility] growth chart PTA is >3 years ago (89lbs on 12/28/21 and 89lbs on 12/24/21).    - Hyponatremic; overall uptrending, nephrology following. While on clear liquids was ordered for 1500cc fluid restriction.  - HgbA1C 8.7% (5/20); patient w/ h/o DM PTA per chart. BG trend erratic from low 100s to >400s. Ordered for lantus, SS & bolus admelog.

## 2025-05-21 NOTE — PROGRESS NOTE ADULT - ASSESSMENT
69 yo F pmhx of IDDM, HTN, HLD, Epilepsy on keppra/vimpat presents for one week of epigastric abdominal pain (worse after eating) and LUQ/LLQ and L flank pain. Nephro called for hyponatremia    Hyponatremia and Pseudohyponatremia  Etiology multifactorial could be hyperglycemia, pain ([place])  Corrected for glucose sodium is 134 today  Advised fluid restrict to 1.5 L per day, currently NPO  Needs better control of hyperglycemia  Urine studies peding  Monitor urine OP    Acidosis  Likely in the setting of Hyperglycemia worsening  Can switch maintenances fluid 1/2 NS with 75 meq sodium bicarb @ 75 cc.hour  Monitor  Correct hyperglycemia    Abdominal pain  Per primary  Planned for ERCP per GI today

## 2025-05-21 NOTE — DIETITIAN INITIAL EVALUATION ADULT - ADD RECOMMEND
1. diet advancement per GI  2. encourage PO intake, protein source with each meal as tolerated when diet advancement is feasible  3. when diet can be advanced, recommend addition of Ensure Max 2x/day for extra caloric/protein support (patient amenable to trial)  4. monitor PO intake, weight trend, electrolytes, blood glucose levels, labs, BMs

## 2025-05-21 NOTE — DIETITIAN INITIAL EVALUATION ADULT - REASON FOR ADMISSION
Per chart, patient is a 71 y/o female with PMH including HTN, HLD, DM, epilepsy. Patient presents to [facility] w/ one week of epigastric abdominal pain (worsened after eating) LUQ/LLQ & L flank pain, endorsing elevated BG and new pruritic full body rash starting one week PTA.

## 2025-05-21 NOTE — DIETITIAN INITIAL EVALUATION ADULT - PERTINENT MEDS FT
MEDICATIONS  (STANDING):  aspirin enteric coated 81 milliGRAM(s) Oral daily  dextrose 5%. 1000 milliLiter(s) (50 mL/Hr) IV Continuous <Continuous>  dextrose 5%. 1000 milliLiter(s) (100 mL/Hr) IV Continuous <Continuous>  dextrose 50% Injectable 25 Gram(s) IV Push once  dextrose 50% Injectable 12.5 Gram(s) IV Push once  dextrose 50% Injectable 25 Gram(s) IV Push once  ezetimibe 10 milliGRAM(s) Oral daily  gabapentin 100 milliGRAM(s) Oral daily  glucagon  Injectable 1 milliGRAM(s) IntraMuscular once  heparin   Injectable 5000 Unit(s) SubCutaneous every 8 hours  insulin glargine Injectable (LANTUS) 10 Unit(s) SubCutaneous at bedtime  insulin lispro (ADMELOG) corrective regimen sliding scale   SubCutaneous three times a day before meals  insulin lispro (ADMELOG) corrective regimen sliding scale   SubCutaneous at bedtime  insulin lispro Injectable (ADMELOG) 5 Unit(s) SubCutaneous three times a day before meals  ketotifen 0.025% Ophthalmic Solution 1 Drop(s) Both EYES two times a day  levETIRAcetam 500 milliGRAM(s) Oral two times a day  metoprolol succinate ER 12.5 milliGRAM(s) Oral daily  pantoprazole    Tablet 40 milliGRAM(s) Oral before breakfast  rosuvastatin 20 milliGRAM(s) Oral at bedtime  saline laxative (FLEET) Rectal Enema 1 Enema Rectal once  sodium bicarbonate  Infusion 0.141 mEq/kG/Hr (75 mL/Hr) IV Continuous <Continuous>    MEDICATIONS  (PRN):  dextrose Oral Gel 15 Gram(s) Oral once PRN Blood Glucose LESS THAN 70 milliGRAM(s)/deciliter  dextrose Oral Gel 15 Gram(s) Oral once PRN Blood Glucose LESS THAN 70 milliGRAM(s)/deciliter

## 2025-05-21 NOTE — DIETITIAN INITIAL EVALUATION ADULT - REASON
Deferred 2/2 current clinical status, muscle/fat depletion observed by clinical physical observation

## 2025-05-21 NOTE — DIETITIAN INITIAL EVALUATION ADULT - ENERGY INTAKE
NPO NPO for anticipated ERCP today noted. Prior, patient endorses decreased poor PO intake/appetite 2/2 ongoing pain that she was admitted with. No chewing/swallowing difficulty reported prior to NPO. No N/V reported for today per the patient.

## 2025-05-21 NOTE — PRE PROCEDURE NOTE - PRE PROCEDURE EVALUATION
Attending Physician:                            Procedure: ercp    Indication for Procedure: cbd stone  ________________________________________________________  PAST MEDICAL & SURGICAL HISTORY:  HLD (hyperlipidemia)      DM (diabetes mellitus)  Type 1      Epilepsy      HTN (hypertension)      Cholelithiasis      No significant past surgical history        ALLERGIES:  penicillin (Headache)    HOME MEDICATIONS:  alendronate 70 mg oral tablet: 1 tab(s) orally once a week  aspirin 81 mg oral delayed release tablet: 1 tab(s) orally once a day  azelastine 0.05% ophthalmic solution: 1 drop(s) in each affected eye 2 times a day  Baqsimi Two Pack 3 mg nasal powder: 3 milligram(s) intranasally as needed  ezetimibe 10 mg oral tablet: 1 tab(s) orally once a day (in the evening)  gabapentin 100 mg oral capsule: 1 cap(s) orally once a day  Lantus Solostar Pen 100 units/mL subcutaneous solution: 2 unit(s) subcutaneous once a day (in the evening)  levETIRAcetam 500 mg oral tablet: 1 tab(s) orally every 12 hours  metoprolol succinate 25 mg oral tablet, extended release: 0.5 tab(s) orally once a day (in the morning)  NovoLOG FlexPen 100 units/mL injectable solution: 8 unit(s) injectable 3 times a day (before meals)  omeprazole 20 mg oral delayed release capsule: 1 cap(s) orally 2 times a day as needed  rosuvastatin 20 mg oral tablet: 1 tab(s) orally once a day (in the morning)  Valtoco 10 mg Dose nasal spray: 1 spray(s) intranasally as needed for when seizure is expected  Vitamin B12 tablet: 1 tablet orally once a day    AICD/PPM: [ ] yes   [ x] no    PERTINENT LAB DATA:                        13.0   7.74  )-----------( 252      ( 21 May 2025 07:19 )             40.2     05-21    130[L]  |  94[L]  |  12  ----------------------------<  352[H]  4.2   |  14[L]  |  0.57    Ca    8.9      21 May 2025 07:19  Phos  2.6     05-20  Mg     1.9     05-20    TPro  7.0  /  Alb  4.0  /  TBili  0.5  /  DBili  0.2  /  AST  22  /  ALT  18  /  AlkPhos  97  05-20                PHYSICAL EXAMINATION:    T(C): 36.1  HR: 82  BP: 142/64  RR: 17  SpO2: 99%    Constitutional: NAD  HEENT: PERRLA, EOMI,    Neck:  No JVD  Respiratory: CTAB/L  Cardiovascular: S1 and S2  Gastrointestinal: BS+, soft, NT/ND  Extremities: No peripheral edema  Neurological: A/O x 3, no focal deficits  Psychiatric: Normal mood, normal affect  Skin: No rashes    ASA Class: I [ ]  II [ ]  III [x ]  IV [ ]    COMMENTS:    The patient is a suitable candidate for the planned procedure unless box checked [ ]  No, explain:      Risks/benefits/Alternatives including, but not limited to, Bleeding, infection, perforation requiring surgery, and anesthesia risk (see anesthesia consent) all discussed with individual consenting on patients behalf, all questions answered regard procedure to the best of my ability

## 2025-05-21 NOTE — PROGRESS NOTE ADULT - SUBJECTIVE AND OBJECTIVE BOX
DATE OF SERVICE: 05-21-25 @ 10:47    Patient is a 70y old  Female who presents with a chief complaint of     SUBJECTIVE / OVERNIGHT EVENTS:  c/o abd pain    MEDICATIONS  (STANDING):  aspirin enteric coated 81 milliGRAM(s) Oral daily  dextrose 5%. 1000 milliLiter(s) (50 mL/Hr) IV Continuous <Continuous>  dextrose 5%. 1000 milliLiter(s) (100 mL/Hr) IV Continuous <Continuous>  dextrose 50% Injectable 25 Gram(s) IV Push once  dextrose 50% Injectable 12.5 Gram(s) IV Push once  dextrose 50% Injectable 25 Gram(s) IV Push once  ezetimibe 10 milliGRAM(s) Oral daily  gabapentin 100 milliGRAM(s) Oral daily  glucagon  Injectable 1 milliGRAM(s) IntraMuscular once  heparin   Injectable 5000 Unit(s) SubCutaneous every 8 hours  insulin lispro (ADMELOG) corrective regimen sliding scale   SubCutaneous three times a day before meals  insulin lispro (ADMELOG) corrective regimen sliding scale   SubCutaneous at bedtime  ketotifen 0.025% Ophthalmic Solution 1 Drop(s) Both EYES two times a day  levETIRAcetam 500 milliGRAM(s) Oral two times a day  metoprolol succinate ER 12.5 milliGRAM(s) Oral daily  pantoprazole    Tablet 40 milliGRAM(s) Oral before breakfast  rosuvastatin 20 milliGRAM(s) Oral at bedtime  saline laxative (FLEET) Rectal Enema 1 Enema Rectal once  sodium chloride 0.9%. 1000 milliLiter(s) (75 mL/Hr) IV Continuous <Continuous>    MEDICATIONS  (PRN):  dextrose Oral Gel 15 Gram(s) Oral once PRN Blood Glucose LESS THAN 70 milliGRAM(s)/deciliter      Vital Signs Last 24 Hrs  T(C): 36.6 (21 May 2025 04:45), Max: 36.7 (20 May 2025 20:00)  T(F): 97.8 (21 May 2025 04:45), Max: 98.1 (20 May 2025 20:00)  HR: 85 (21 May 2025 04:45) (85 - 92)  BP: 125/70 (21 May 2025 04:45) (123/72 - 136/81)  BP(mean): --  RR: 18 (21 May 2025 04:45) (18 - 18)  SpO2: 97% (21 May 2025 04:45) (93% - 97%)    Parameters below as of 21 May 2025 04:45  Patient On (Oxygen Delivery Method): room air      CAPILLARY BLOOD GLUCOSE      POCT Blood Glucose.: 332 mg/dL (21 May 2025 08:06)  POCT Blood Glucose.: 301 mg/dL (20 May 2025 21:01)  POCT Blood Glucose.: 272 mg/dL (20 May 2025 17:10)  POCT Blood Glucose.: 171 mg/dL (20 May 2025 12:16)    I&O's Summary    20 May 2025 07:01  -  21 May 2025 07:00  --------------------------------------------------------  IN: 300 mL / OUT: 0 mL / NET: 300 mL        PHYSICAL EXAM:  GENERAL: NAD, well-developed  HEAD:  Atraumatic, Normocephalic  EYES: EOMI, PERRLA, conjunctiva and sclera clear  NECK: Supple, No JVD  CHEST/LUNG: Clear to auscultation bilaterally; No wheeze  HEART: Regular rate and rhythm; No murmurs, rubs, or gallops  ABDOMEN: Soft, Nontender, Nondistended; Bowel sounds present  EXTREMITIES:  2+ Peripheral Pulses, No clubbing, cyanosis, or edema  PSYCH: AAOx3  NEUROLOGY: non-focal  SKIN: No rashes or lesions    LABS:                        13.0   7.74  )-----------( 252      ( 21 May 2025 07:19 )             40.2     05-21    130[L]  |  94[L]  |  12  ----------------------------<  352[H]  4.2   |  14[L]  |  0.57    Ca    8.9      21 May 2025 07:19  Phos  2.6     05-20  Mg     1.9     05-20    TPro  7.0  /  Alb  4.0  /  TBili  0.5  /  DBili  0.2  /  AST  22  /  ALT  18  /  AlkPhos  97  05-20          Urinalysis Basic - ( 21 May 2025 07:19 )    Color: x / Appearance: x / SG: x / pH: x  Gluc: 352 mg/dL / Ketone: x  / Bili: x / Urobili: x   Blood: x / Protein: x / Nitrite: x   Leuk Esterase: x / RBC: x / WBC x   Sq Epi: x / Non Sq Epi: x / Bacteria: x    < from: MR MRCP w/wo IV Cont (05.20.25 @ 15:39) >    PROCEDURE:  MRI of the abdomen was performed.  MRCP was performed.    FINDINGS:  LOWER CHEST: Within normal limits.    LIVER: Within normal limits.  BILE DUCTS: Extrahepatic biliary ductaldilatation. CBD measures 8 mm.   Stone in the distal CBD measures 7 mm.  GALLBLADDER: Cholelithiasis. Fundal adenomyomatosis.  SPLEEN: Within normal limits.  PANCREAS: Within normal limits.  ADRENALS: Within normal limits.  KIDNEYS/URETERS: Within normal limits.    VISUALIZED PORTIONS:  BOWEL: Within normal limits.  PERITONEUM: No ascites.  VESSELS: Within normal limits.  RETROPERITONEUM/LYMPH NODES: No lymphadenopathy.  ABDOMINAL WALL: Within normal limits.  BONES: Within normal limits.    IMPRESSION:  *  Cholelithiasis and choledocholithiasis.  *  Biliary ductal dilatation with 8 mm CBD.          < end of copied text >      RADIOLOGY & ADDITIONAL TESTS:    Imaging Personally Reviewed:    Consultant(s) Notes Reviewed:      Care Discussed with Consultants/Other Providers:

## 2025-05-21 NOTE — DIETITIAN INITIAL EVALUATION ADULT - NSFNSNUTRHOMESUPPLEMENTFT_GEN_A_CORE
Vitamin B12, patient states she was also thinking of trying liquid oral nutrition supplements at home but she was unsure of what would be the best choice while considering carbohydrate/sugar intake for DM management

## 2025-05-21 NOTE — DIETITIAN INITIAL EVALUATION ADULT - NS FNS DIET ORDER
Diet, Consistent Carbohydrate Clear Liquid:   1500mL Fluid Restriction ([id number]) (05-20-25 @ 16:22)  Diet, NPO after Midnight:      NPO Start Date: 20-May-2025,   NPO Start Time: 23:59 (05-20-25 @ 16:18)  Diet, NPO after Midnight:      NPO Start Date: 20-May-2025,   NPO Start Time: 23:59 (05-20-25 @ 15:38)

## 2025-05-21 NOTE — DIETITIAN NUTRITION RISK NOTIFICATION - TREATMENT: THE FOLLOWING DIET HAS BEEN RECOMMENDED
Diet, Consistent Carbohydrate Clear Liquid:   1500mL Fluid Restriction ([id number]) (05-20-25 @ 16:22) [Active]  Diet, NPO after Midnight:      NPO Start Date: 20-May-2025,   NPO Start Time: 23:59 (05-20-25 @ 16:18) [Active]  Diet, NPO after Midnight:      NPO Start Date: 20-May-2025,   NPO Start Time: 23:59 (05-20-25 @ 15:38) [Active]

## 2025-05-22 DIAGNOSIS — E11.9 TYPE 2 DIABETES MELLITUS WITHOUT COMPLICATIONS: ICD-10-CM

## 2025-05-22 DIAGNOSIS — I10 ESSENTIAL (PRIMARY) HYPERTENSION: ICD-10-CM

## 2025-05-22 DIAGNOSIS — E78.5 HYPERLIPIDEMIA, UNSPECIFIED: ICD-10-CM

## 2025-05-22 LAB
ANION GAP SERPL CALC-SCNC: 11 MMOL/L — SIGNIFICANT CHANGE UP (ref 5–17)
BUN SERPL-MCNC: 11 MG/DL — SIGNIFICANT CHANGE UP (ref 7–23)
CALCIUM SERPL-MCNC: 8.1 MG/DL — LOW (ref 8.4–10.5)
CHLORIDE SERPL-SCNC: 96 MMOL/L — SIGNIFICANT CHANGE UP (ref 96–108)
CO2 SERPL-SCNC: 25 MMOL/L — SIGNIFICANT CHANGE UP (ref 22–31)
CREAT SERPL-MCNC: 0.63 MG/DL — SIGNIFICANT CHANGE UP (ref 0.5–1.3)
EGFR: 95 ML/MIN/1.73M2 — SIGNIFICANT CHANGE UP
EGFR: 95 ML/MIN/1.73M2 — SIGNIFICANT CHANGE UP
GLUCOSE BLDC GLUCOMTR-MCNC: 131 MG/DL — HIGH (ref 70–99)
GLUCOSE BLDC GLUCOMTR-MCNC: 196 MG/DL — HIGH (ref 70–99)
GLUCOSE BLDC GLUCOMTR-MCNC: 422 MG/DL — HIGH (ref 70–99)
GLUCOSE BLDC GLUCOMTR-MCNC: 490 MG/DL — CRITICAL HIGH (ref 70–99)
GLUCOSE BLDC GLUCOMTR-MCNC: 507 MG/DL — CRITICAL HIGH (ref 70–99)
GLUCOSE BLDC GLUCOMTR-MCNC: 512 MG/DL — CRITICAL HIGH (ref 70–99)
GLUCOSE BLDC GLUCOMTR-MCNC: 67 MG/DL — LOW (ref 70–99)
GLUCOSE BLDC GLUCOMTR-MCNC: 71 MG/DL — SIGNIFICANT CHANGE UP (ref 70–99)
GLUCOSE BLDC GLUCOMTR-MCNC: 78 MG/DL — SIGNIFICANT CHANGE UP (ref 70–99)
GLUCOSE SERPL-MCNC: 106 MG/DL — HIGH (ref 70–99)
POTASSIUM SERPL-MCNC: 3.7 MMOL/L — SIGNIFICANT CHANGE UP (ref 3.5–5.3)
POTASSIUM SERPL-SCNC: 3.7 MMOL/L — SIGNIFICANT CHANGE UP (ref 3.5–5.3)
SODIUM SERPL-SCNC: 132 MMOL/L — LOW (ref 135–145)

## 2025-05-22 PROCEDURE — 99222 1ST HOSP IP/OBS MODERATE 55: CPT

## 2025-05-22 RX ORDER — LACTULOSE 10 G/15ML
20 SOLUTION ORAL EVERY 4 HOURS
Refills: 0 | Status: COMPLETED | OUTPATIENT
Start: 2025-05-22 | End: 2025-05-22

## 2025-05-22 RX ADMIN — LEVETIRACETAM 500 MILLIGRAM(S): 10 INJECTION, SOLUTION INTRAVENOUS at 17:22

## 2025-05-22 RX ADMIN — HEPARIN SODIUM 5000 UNIT(S): 1000 INJECTION INTRAVENOUS; SUBCUTANEOUS at 13:38

## 2025-05-22 RX ADMIN — ROSUVASTATIN CALCIUM 20 MILLIGRAM(S): 20 TABLET, FILM COATED ORAL at 21:54

## 2025-05-22 RX ADMIN — INSULIN LISPRO 6: 100 INJECTION, SOLUTION INTRAVENOUS; SUBCUTANEOUS at 12:57

## 2025-05-22 RX ADMIN — INSULIN LISPRO 1: 100 INJECTION, SOLUTION INTRAVENOUS; SUBCUTANEOUS at 17:23

## 2025-05-22 RX ADMIN — INSULIN LISPRO 5 UNIT(S): 100 INJECTION, SOLUTION INTRAVENOUS; SUBCUTANEOUS at 12:58

## 2025-05-22 RX ADMIN — INSULIN LISPRO 5 UNIT(S): 100 INJECTION, SOLUTION INTRAVENOUS; SUBCUTANEOUS at 17:24

## 2025-05-22 RX ADMIN — HEPARIN SODIUM 5000 UNIT(S): 1000 INJECTION INTRAVENOUS; SUBCUTANEOUS at 21:54

## 2025-05-22 RX ADMIN — KETOTIFEN FUMARATE 1 DROP(S): 0.35 SOLUTION/ DROPS OPHTHALMIC at 17:24

## 2025-05-22 RX ADMIN — LACTULOSE 20 GRAM(S): 10 SOLUTION ORAL at 17:22

## 2025-05-22 RX ADMIN — GABAPENTIN 100 MILLIGRAM(S): 400 CAPSULE ORAL at 12:13

## 2025-05-22 RX ADMIN — Medication 75 MEQ/KG/HR: at 05:23

## 2025-05-22 RX ADMIN — LACTULOSE 20 GRAM(S): 10 SOLUTION ORAL at 13:25

## 2025-05-22 RX ADMIN — INSULIN GLARGINE-YFGN 10 UNIT(S): 100 INJECTION, SOLUTION SUBCUTANEOUS at 23:01

## 2025-05-22 RX ADMIN — Medication 40 MILLIGRAM(S): at 05:23

## 2025-05-22 RX ADMIN — LEVETIRACETAM 500 MILLIGRAM(S): 10 INJECTION, SOLUTION INTRAVENOUS at 05:23

## 2025-05-22 RX ADMIN — HEPARIN SODIUM 5000 UNIT(S): 1000 INJECTION INTRAVENOUS; SUBCUTANEOUS at 05:24

## 2025-05-22 RX ADMIN — KETOTIFEN FUMARATE 1 DROP(S): 0.35 SOLUTION/ DROPS OPHTHALMIC at 05:24

## 2025-05-22 RX ADMIN — METOPROLOL SUCCINATE 12.5 MILLIGRAM(S): 50 TABLET, EXTENDED RELEASE ORAL at 05:25

## 2025-05-22 RX ADMIN — EZETIMIBE 10 MILLIGRAM(S): 10 TABLET ORAL at 12:14

## 2025-05-22 RX ADMIN — Medication 81 MILLIGRAM(S): at 12:14

## 2025-05-22 NOTE — CONSULT NOTE ADULT - ASSESSMENT
69 y/o female with PMH including HTN, HLD, DM, epilepsy. Patient presents to [facility] w/ one week of epigastric abdominal pain (worsened after eating) LUQ/LLQ & L flank pain, endorsing elevated BG and new pruritic full body rash      Assessment  DMT2: 70y Female with DM T2 with hyperglycemia, A1C 8.7% , was on insulin at home, now on basal bolus insulin with coverage, blood sugars running high, labile.   Abd pain: on medications, GI eval, monitored.  HTN: on antihypertensive medications, monitored, asymptomatic.      Discussed plan and management wit Dr Titi Walls MD  Cell: [phone]  Office: [phone]             69 y/o female with PMH including HTN, HLD, DM, epilepsy. Patient presents to [facility] w/ one week of epigastric abdominal pain (worsened after eating) LUQ/LLQ & L flank pain, endorsing elevated BG and new pruritic full body rash    Assessment  DMT2: 70y Female with DM T2 with hyperglycemia, A1C 8.7% , was on insulin at home, now on basal bolus insulin with coverage, blood sugars running high, labile.   Abd pain: on medications, GI eval, monitored.  HTN: on antihypertensive medications, monitored, asymptomatic.      Discussed plan and management wit Dr Titi Walls MD  Cell: [phone]  Office: [phone]

## 2025-05-22 NOTE — PROGRESS NOTE ADULT - ASSESSMENT
71 yo F pmhx of IDDM, HTN, HLD, Epilepsy on keppra/vimpat presents for one week of epigastric abdominal pain (worse after eating) and LUQ/LLQ and L flank pain. Nephro called for hyponatremia    Hyponatremia and Pseudohyponatremia  Etiology multifactorial could be hyperglycemia, pain ([place])  Corrected for glucose sodium is 132-134  Advised fluid restrict to 1.5 L per day  Needs better control of hyperglycemia  Urine studies pending  Monitor urine OP    Acidosis  Likely in the setting of Hyperglycemia worsening  S.p bicarb drip better can hold today  Monitor  Correct hyperglycemia    Abdominal pain  Per primary  S/p ERCP and removal of bile stones  Surgery consulted possible lap west

## 2025-05-22 NOTE — CONSULT NOTE ADULT - SUBJECTIVE AND OBJECTIVE BOX
HPI:  71 yo F pmhx of IDDM, HTN, HLD, Epilepsy on keppra/vimpat presents for one week of epigastric abdominal pain (worse after eating) and LUQ/LLQ and L flank pain. Patient states that she has been having elevated blood sugars and endorses new pruritic full body rash that began one week ago. Denies fever, chills, chest pain, shortness of breath, new allergens, nausea, vomiting, hematuria, dysuria, hematochezia, constipation, or any other symptoms at this time (19 May 2025 14:13)      Patient has history of diabetes, A1C A1C with Estimated Average Glucose Result: 8.7 %   Endo was consulted for glycemic control.      PAST MEDICAL & SURGICAL HISTORY:  HLD (hyperlipidemia)      DM (diabetes mellitus)  Type 1      Epilepsy      HTN (hypertension)      Cholelithiasis      No significant past surgical history          FAMILY HISTORY:  FH: type 1 diabetes (Grandparent)        Social History:  no tob  no etoh (19 May 2025 14:13)            HOME MEDICATIONS:  Home Medications:  alendronate 70 mg oral tablet: 1 tab(s) orally once a week (19 May 2025 14:57)  aspirin 81 mg oral delayed release tablet: 1 tab(s) orally once a day (19 May 2025 14:57)  azelastine 0.05% ophthalmic solution: 1 drop(s) in each affected eye 2 times a day (19 May 2025 14:59)  Baqsimi Two Pack 3 mg nasal powder: 3 milligram(s) intranasally as needed (19 May 2025 14:59)  ezetimibe 10 mg oral tablet: 1 tab(s) orally once a day (in the evening) (19 May 2025 14:59)  gabapentin 100 mg oral capsule: 1 cap(s) orally once a day (19 May 2025 14:59)  Lantus Solostar Pen 100 units/mL subcutaneous solution: 2 unit(s) subcutaneous once a day (in the evening) (19 May 2025 14:59)  levETIRAcetam 500 mg oral tablet: 1 tab(s) orally every 12 hours (19 May 2025 14:59)  metoprolol succinate 25 mg oral tablet, extended release: 0.5 tab(s) orally once a day (in the morning) (19 May 2025 14:59)  NovoLOG FlexPen 100 units/mL injectable solution: 8 unit(s) injectable 3 times a day (before meals) (19 May 2025 14:59)  omeprazole 20 mg oral delayed release capsule: 1 cap(s) orally 2 times a day as needed (19 May 2025 14:59)  rosuvastatin 20 mg oral tablet: 1 tab(s) orally once a day (in the morning) (19 May 2025 14:59)  Valtoco 10 mg Dose nasal spray: 1 spray(s) intranasally as needed for when seizure is expected (19 May 2025 14:59)  Vitamin B12 tablet: 1 tablet orally once a day (19 May 2025 14:59)            MEDICATIONS  (STANDING):  aspirin enteric coated 81 milliGRAM(s) Oral daily  ciprofloxacin   IVPB 400 milliGRAM(s) IV Intermittent once  dextrose 5%. 1000 milliLiter(s) (50 mL/Hr) IV Continuous <Continuous>  dextrose 5%. 1000 milliLiter(s) (100 mL/Hr) IV Continuous <Continuous>  dextrose 50% Injectable 25 Gram(s) IV Push once  dextrose 50% Injectable 12.5 Gram(s) IV Push once  dextrose 50% Injectable 25 Gram(s) IV Push once  ezetimibe 10 milliGRAM(s) Oral daily  gabapentin 100 milliGRAM(s) Oral daily  glucagon  Injectable 1 milliGRAM(s) IntraMuscular once  heparin   Injectable 5000 Unit(s) SubCutaneous every 8 hours  indomethacin Suppository 100 milliGRAM(s) Rectal once  insulin glargine Injectable (LANTUS) 10 Unit(s) SubCutaneous at bedtime  insulin lispro (ADMELOG) corrective regimen sliding scale   SubCutaneous three times a day before meals  insulin lispro (ADMELOG) corrective regimen sliding scale   SubCutaneous at bedtime  insulin lispro Injectable (ADMELOG) 5 Unit(s) SubCutaneous three times a day before meals  ketotifen 0.025% Ophthalmic Solution 1 Drop(s) Both EYES two times a day  lactulose Syrup 20 Gram(s) Oral every 4 hours  levETIRAcetam 500 milliGRAM(s) Oral two times a day  metoprolol succinate ER 12.5 milliGRAM(s) Oral daily  pantoprazole    Tablet 40 milliGRAM(s) Oral before breakfast  rosuvastatin 20 milliGRAM(s) Oral at bedtime  saline laxative (FLEET) Rectal Enema 1 Enema Rectal once    MEDICATIONS  (PRN):  dextrose Oral Gel 15 Gram(s) Oral once PRN Blood Glucose LESS THAN 70 milliGRAM(s)/deciliter  dextrose Oral Gel 15 Gram(s) Oral once PRN Blood Glucose LESS THAN 70 milliGRAM(s)/deciliter      Allergies    penicillin (Headache)    Intolerances        Review of Systems:  Neuro: No HA, no dizziness  Cardiovascular: No chest pain, no palpitations  Respiratory: no SOB, no cough  GI: No nausea, vomiting, abdominal pain  MSK: Denies joint/muscle pain      ALL OTHER SYSTEMS REVIEWED AND NEGATIVE      PHYSICAL EXAM:  VITALS: T(C): 36.3 (05-22-25 @ 14:17)  T(F): 97.4 (05-22-25 @ 14:17), Max: 97.7 (05-21-25 @ 20:47)  HR: 86 (05-22-25 @ 14:17) (69 - 86)  BP: 120/73 (05-22-25 @ 14:17) (110/57 - 133/73)  RR:  (18 - 18)  SpO2:  (96% - 97%)  Wt(kg): --  GENERAL: NAD, well-groomed, well-developed  NEURO:  alert and oriented  RESPIRATORY: Clear to auscultation bilaterally; No rales, rhonchi, wheezing  CARDIOVASCULAR: Si S2  GI: Soft, non distended, normal bowel sounds  MUSCULOSKELETAL: Moves all extremities equally       POCT Blood Glucose.: 422 mg/dL (05-22-25 @ 13:22)  POCT Blood Glucose.: 507 mg/dL (05-22-25 @ 12:45)  POCT Blood Glucose.: 512 mg/dL (05-22-25 @ 12:45)  POCT Blood Glucose.: 490 mg/dL (05-22-25 @ 12:44)  POCT Blood Glucose.: 71 mg/dL (05-22-25 @ 08:28)  POCT Blood Glucose.: 67 mg/dL (05-22-25 @ 08:27)  POCT Blood Glucose.: 286 mg/dL (05-21-25 @ 21:17)  POCT Blood Glucose.: 180 mg/dL (05-21-25 @ 16:56)  POCT Blood Glucose.: 156 mg/dL (05-21-25 @ 12:14)  POCT Blood Glucose.: 332 mg/dL (05-21-25 @ 08:06)  POCT Blood Glucose.: 301 mg/dL (05-20-25 @ 21:01)  POCT Blood Glucose.: 272 mg/dL (05-20-25 @ 17:10)  POCT Blood Glucose.: 171 mg/dL (05-20-25 @ 12:16)  POCT Blood Glucose.: 336 mg/dL (05-20-25 @ 08:07)  POCT Blood Glucose.: 254 mg/dL (05-19-25 @ 20:53)  POCT Blood Glucose.: 106 mg/dL (05-19-25 @ 17:36)                            13.0   7.74  )-----------( 252      ( 21 May 2025 07:19 )             40.2       05-22    132[L]  |  96  |  11  ----------------------------<  106[H]  3.7   |  25  |  0.63    eGFR: 95    Ca    8.1[L]      05-22  Mg     1.9     05-20  Phos  2.6     05-20    TPro  7.0  /  Alb  4.0  /  TBili  0.5  /  DBili  0.2  /  AST  22  /  ALT  18  /  AlkPhos  97  05-20      Thyroid Function Tests:  05-21 @ 07:21 TSH 1.66 FreeT4 -- T3 -- Anti TPO -- Anti Thyroglobulin Ab -- TSI --  05-20 @ 05:48 TSH 2.32 FreeT4 -- T3 -- Anti TPO -- Anti Thyroglobulin Ab -- TSI --    Diet, Consistent Carbohydrate/No Snacks (05-22-25 @ 11:09) [Active]          A1C with Estimated Average Glucose Result: 8.7 % (05-20-25 @ 05:48)                   HPI:  71 yo F pmhx of IDDM, HTN, HLD, Epilepsy on keppra/vimpat presents for one week of epigastric abdominal pain (worse after eating) and LUQ/LLQ and L flank pain. Patient states that she has been having elevated blood sugars and endorses new pruritic full body rash that began one week ago. Denies fever, chills, chest pain, shortness of breath, new allergens, nausea, vomiting, hematuria, dysuria, hematochezia, constipation, or any other symptoms at this time (19 May 2025 14:13)      Patient has history of diabetes, A1C A1C with Estimated Average Glucose Result: 8.7 %   Endo was consulted for glycemic control.      PAST MEDICAL & SURGICAL HISTORY:  HLD (hyperlipidemia)      DM (diabetes mellitus)  Type 1      Epilepsy      HTN (hypertension)      Cholelithiasis      No significant past surgical history          FAMILY HISTORY:  FH: type 1 diabetes (Grandparent)        Social History:  no tob  no etoh (19 May 2025 14:13)            HOME MEDICATIONS:  Home Medications:  alendronate 70 mg oral tablet: 1 tab(s) orally once a week (19 May 2025 14:57)  aspirin 81 mg oral delayed release tablet: 1 tab(s) orally once a day (19 May 2025 14:57)  azelastine 0.05% ophthalmic solution: 1 drop(s) in each affected eye 2 times a day (19 May 2025 14:59)  Baqsimi Two Pack 3 mg nasal powder: 3 milligram(s) intranasally as needed (19 May 2025 14:59)  ezetimibe 10 mg oral tablet: 1 tab(s) orally once a day (in the evening) (19 May 2025 14:59)  gabapentin 100 mg oral capsule: 1 cap(s) orally once a day (19 May 2025 14:59)  Lantus Solostar Pen 100 units/mL subcutaneous solution: 2 unit(s) subcutaneous once a day (in the evening) (19 May 2025 14:59)  levETIRAcetam 500 mg oral tablet: 1 tab(s) orally every 12 hours (19 May 2025 14:59)  metoprolol succinate 25 mg oral tablet, extended release: 0.5 tab(s) orally once a day (in the morning) (19 May 2025 14:59)  NovoLOG FlexPen 100 units/mL injectable solution: 8 unit(s) injectable 3 times a day (before meals) (19 May 2025 14:59)  omeprazole 20 mg oral delayed release capsule: 1 cap(s) orally 2 times a day as needed (19 May 2025 14:59)  rosuvastatin 20 mg oral tablet: 1 tab(s) orally once a day (in the morning) (19 May 2025 14:59)  Valtoco 10 mg Dose nasal spray: 1 spray(s) intranasally as needed for when seizure is expected (19 May 2025 14:59)  Vitamin B12 tablet: 1 tablet orally once a day (19 May 2025 14:59)            MEDICATIONS  (STANDING):  aspirin enteric coated 81 milliGRAM(s) Oral daily  ciprofloxacin   IVPB 400 milliGRAM(s) IV Intermittent once  dextrose 5%. 1000 milliLiter(s) (50 mL/Hr) IV Continuous <Continuous>  dextrose 5%. 1000 milliLiter(s) (100 mL/Hr) IV Continuous <Continuous>  dextrose 50% Injectable 25 Gram(s) IV Push once  dextrose 50% Injectable 12.5 Gram(s) IV Push once  dextrose 50% Injectable 25 Gram(s) IV Push once  ezetimibe 10 milliGRAM(s) Oral daily  gabapentin 100 milliGRAM(s) Oral daily  glucagon  Injectable 1 milliGRAM(s) IntraMuscular once  heparin   Injectable 5000 Unit(s) SubCutaneous every 8 hours  indomethacin Suppository 100 milliGRAM(s) Rectal once  insulin glargine Injectable (LANTUS) 10 Unit(s) SubCutaneous at bedtime  insulin lispro (ADMELOG) corrective regimen sliding scale   SubCutaneous three times a day before meals  insulin lispro (ADMELOG) corrective regimen sliding scale   SubCutaneous at bedtime  insulin lispro Injectable (ADMELOG) 5 Unit(s) SubCutaneous three times a day before meals  ketotifen 0.025% Ophthalmic Solution 1 Drop(s) Both EYES two times a day  lactulose Syrup 20 Gram(s) Oral every 4 hours  levETIRAcetam 500 milliGRAM(s) Oral two times a day  metoprolol succinate ER 12.5 milliGRAM(s) Oral daily  pantoprazole    Tablet 40 milliGRAM(s) Oral before breakfast  rosuvastatin 20 milliGRAM(s) Oral at bedtime  saline laxative (FLEET) Rectal Enema 1 Enema Rectal once    MEDICATIONS  (PRN):  dextrose Oral Gel 15 Gram(s) Oral once PRN Blood Glucose LESS THAN 70 milliGRAM(s)/deciliter  dextrose Oral Gel 15 Gram(s) Oral once PRN Blood Glucose LESS THAN 70 milliGRAM(s)/deciliter      Allergies    penicillin (Headache)    Intolerances        Review of Systems:  Neuro: No HA, no dizziness  Cardiovascular: No chest pain, no palpitations  Respiratory: no SOB, no cough  GI: No nausea, vomiting, abdominal pain  MSK: Denies joint/muscle pain      ALL OTHER SYSTEMS REVIEWED AND NEGATIVE      PHYSICAL EXAM:  VITALS: T(C): 36.3 (05-22-25 @ 14:17)  T(F): 97.4 (05-22-25 @ 14:17), Max: 97.7 (05-21-25 @ 20:47)  HR: 86 (05-22-25 @ 14:17) (69 - 86)  BP: 120/73 (05-22-25 @ 14:17) (110/57 - 133/73)  RR:  (18 - 18)  SpO2:  (96% - 97%)  Wt(kg): --  GENERAL: NAD, well-groomed, well-developed  NEURO:  alert and oriented  RESPIRATORY: Clear to auscultation bilaterally; No rales, rhonchi, wheezing  CARDIOVASCULAR: Si S2  GI: Soft, non distended, normal bowel sounds  MUSCULOSKELETAL: Moves all extremities equally       POCT Blood Glucose.: 422 mg/dL (05-22-25 @ 13:22)  POCT Blood Glucose.: 507 mg/dL (05-22-25 @ 12:45)  POCT Blood Glucose.: 512 mg/dL (05-22-25 @ 12:45)  POCT Blood Glucose.: 490 mg/dL (05-22-25 @ 12:44)  POCT Blood Glucose.: 71 mg/dL (05-22-25 @ 08:28)  POCT Blood Glucose.: 67 mg/dL (05-22-25 @ 08:27)  POCT Blood Glucose.: 286 mg/dL (05-21-25 @ 21:17)  POCT Blood Glucose.: 180 mg/dL (05-21-25 @ 16:56)  POCT Blood Glucose.: 156 mg/dL (05-21-25 @ 12:14)  POCT Blood Glucose.: 332 mg/dL (05-21-25 @ 08:06)  POCT Blood Glucose.: 301 mg/dL (05-20-25 @ 21:01)  POCT Blood Glucose.: 272 mg/dL (05-20-25 @ 17:10)  POCT Blood Glucose.: 171 mg/dL (05-20-25 @ 12:16)  POCT Blood Glucose.: 336 mg/dL (05-20-25 @ 08:07)  POCT Blood Glucose.: 254 mg/dL (05-19-25 @ 20:53)  POCT Blood Glucose.: 106 mg/dL (05-19-25 @ 17:36)                            13.0   7.74  )-----------( 252      ( 21 May 2025 07:19 )             40.2       05-22    132[L]  |  96  |  11  ----------------------------<  106[H]  3.7   |  25  |  0.63    eGFR: 95    Ca    8.1[L]      05-22  Mg     1.9     05-20  Phos  2.6     05-20    TPro  7.0  /  Alb  4.0  /  TBili  0.5  /  DBili  0.2  /  AST  22  /  ALT  18  /  AlkPhos  97  05-20      Thyroid Function Tests:  05-21 @ 07:21 TSH 1.66 FreeT4 -- T3 -- Anti TPO -- Anti Thyroglobulin Ab -- TSI --  05-20 @ 05:48 TSH 2.32 FreeT4 -- T3 -- Anti TPO -- Anti Thyroglobulin Ab -- TSI --    Diet, Consistent Carbohydrate/No Snacks (05-22-25 @ 11:09) [Active]          A1C with Estimated Average Glucose Result: 8.7 % (05-20-25 @ 05:48)

## 2025-05-22 NOTE — PROGRESS NOTE ADULT - ASSESSMENT
69 yo F pmhx of IDDM, HTN, HLD, Epilepsy on keppra/vimpat presents for one week of epigastric abdominal pain, found to have choledocholithiases now status post ERCP with removal and balloon extraction of stones. General Surgery was consulted for interval CCY.     Recommendations:   - Recommend CCY during this admission   - Patient and family wanted to further discuss cholecystectomy during this admission vs. follow up as outpatient  - Pt did not want surgery this morning, will follow-up regarding preference.      ACS/Trauma  [phone] (pager)

## 2025-05-22 NOTE — PROGRESS NOTE ADULT - ASSESSMENT
Patient advised to call if any problems, questions, or concerns. 71 yo F pmhx of IDDM, HTN, HLD, Epilepsy on keppra/vimpat presents for one week of epigastric abdominal pain (worse after eating) and LUQ/LLQ and L flank pain. Patient states that she has been having elevated blood sugars and endorses new pruritic full body rash that began one week ago. Denies fever, chills, chest pain, shortness of breath, new allergens, nausea, vomiting, hematuria, dysuria, hematochezia, constipation, or any other symptoms at this time    choledocholithiases/cholecystitis  - MRCP done  - ERCP done  - needs lap west  - surgery is following    hyponatremia improved  - nephrology consult following  - fluid restrict to 1.5 L per day    seizure d/o  - c/w keppra    neuropathy  - c/w gabapentin    diabetes  - fs qid  - hgb a1c 8.7  - insulin ss  - lantus 10 units qhs started  - endo consult    HLD  - c/w statin  - c/w zetia    dvt px  - sq heparin           71 yo F pmhx of IDDM, HTN, HLD, Epilepsy on keppra/vimpat presents for one week of epigastric abdominal pain (worse after eating) and LUQ/LLQ and L flank pain. Patient states that she has been having elevated blood sugars and endorses new pruritic full body rash that began one week ago. Denies fever, chills, chest pain, shortness of breath, new allergens, nausea, vomiting, hematuria, dysuria, hematochezia, constipation, or any other symptoms at this time    choledocholithiases/cholecystitis  - MRCP done  - ERCP done  - needs lap west  - surgery is following    hyponatremia improved  - nephrology consult following  - fluid restrict to 1.5 L per day    seizure d/o  - c/w keppra    neuropathy  - c/w gabapentin    diabetes  - fs qid  - hgb a1c 8.7  - insulin ss  - lantus 10 units qhs started  - endo consult    constipation  - senna   - mirallax  - lactulose    HLD  - c/w statin  - c/w zetia    dvt px  - sq heparin

## 2025-05-22 NOTE — PROGRESS NOTE ADULT - SUBJECTIVE AND OBJECTIVE BOX
OVERNIGHT/INTERVAL  - No acute events overnight  - Patient seen and examined at bedside with surgical team    OBJECTIVE  T(C): 36.3, Max: 36.5 (05-21)  T(F): 97.4, Max: 97.7 (05-21)  HR: 86 (69 - 86)  BP: 120/73 (110/57 - 133/73)  BP(mean): --  RR: 18 (18 - 18)  SpO2: 97% (96% - 97%) room air       ARCHIE:      Chest Tube:      NG Tube:     CBC (05-21 @ 07:19)                              13.0                           7.74    )----------------(  252        --    % Neutrophils, --    % Lymphocytes, ANC: --                                  40.2      BMP (05-22 @ 07:13)             132[L]  |  96      |  11    		Ca++ --      Ca 8.1[L]             ---------------------------------( 106[H]		Mg --                 3.7     |  25      |  0.63  			Ph --      BMP (05-21 @ 07:19)             130[L]  |  94[L]   |  12    		Ca++ --      Ca 8.9                ---------------------------------( 352[H]		Mg --                 4.2     |  14[L]   |  0.57  			Ph --                Urinalysis (05-22 @ 07:13):     Color:  / Appearance:  / SG:  / pH:  / Gluc: 106[H] / Ketones:  / Bili:  / Urobili:  / Protein : / Nitrites:  / Leuk.Est:  / RBC:  / WBC:  / Sq Epi:  / Non Sq Epi:  / Bacteria            PHYSICAL EXAM  GENERAL: NAD, lying in bed   NEURO: AOx3, awake alert appropriate  HEENT: NCAT, trachea midline  PULM: Respirations non-labored  ABD: Soft, non-tender, non-distended, no peritonitis/rebound tenderness  EXT: Warm, well perfused

## 2025-05-22 NOTE — PROGRESS NOTE ADULT - SUBJECTIVE AND OBJECTIVE BOX
DATE OF SERVICE: 05-22-25 @ 11:05    Patient is a 70y old  Female who presents with a chief complaint of Per chart, patient is a 71 y/o female with PMH including HTN, HLD, DM, epilepsy. Patient presents to [facility] w/ one week of epigastric abdominal pain (worsened after eating) LUQ/LLQ & L flank pain, endorsing elevated BG and new pruritic full body rash starting one week PTA. (21 May 2025 12:09)      SUBJECTIVE / OVERNIGHT EVENTS:  No chest pain. No shortness of breath. No complaints. No events overnight.     MEDICATIONS  (STANDING):  aspirin enteric coated 81 milliGRAM(s) Oral daily  ciprofloxacin   IVPB 400 milliGRAM(s) IV Intermittent once  dextrose 5%. 1000 milliLiter(s) (50 mL/Hr) IV Continuous <Continuous>  dextrose 5%. 1000 milliLiter(s) (100 mL/Hr) IV Continuous <Continuous>  dextrose 50% Injectable 25 Gram(s) IV Push once  dextrose 50% Injectable 12.5 Gram(s) IV Push once  dextrose 50% Injectable 25 Gram(s) IV Push once  ezetimibe 10 milliGRAM(s) Oral daily  gabapentin 100 milliGRAM(s) Oral daily  glucagon  Injectable 1 milliGRAM(s) IntraMuscular once  heparin   Injectable 5000 Unit(s) SubCutaneous every 8 hours  indomethacin Suppository 100 milliGRAM(s) Rectal once  insulin glargine Injectable (LANTUS) 10 Unit(s) SubCutaneous at bedtime  insulin lispro (ADMELOG) corrective regimen sliding scale   SubCutaneous three times a day before meals  insulin lispro (ADMELOG) corrective regimen sliding scale   SubCutaneous at bedtime  insulin lispro Injectable (ADMELOG) 5 Unit(s) SubCutaneous three times a day before meals  ketotifen 0.025% Ophthalmic Solution 1 Drop(s) Both EYES two times a day  levETIRAcetam 500 milliGRAM(s) Oral two times a day  metoprolol succinate ER 12.5 milliGRAM(s) Oral daily  pantoprazole    Tablet 40 milliGRAM(s) Oral before breakfast  rosuvastatin 20 milliGRAM(s) Oral at bedtime  saline laxative (FLEET) Rectal Enema 1 Enema Rectal once    MEDICATIONS  (PRN):  dextrose Oral Gel 15 Gram(s) Oral once PRN Blood Glucose LESS THAN 70 milliGRAM(s)/deciliter  dextrose Oral Gel 15 Gram(s) Oral once PRN Blood Glucose LESS THAN 70 milliGRAM(s)/deciliter      Vital Signs Last 24 Hrs  T(C): 36.4 (22 May 2025 04:40), Max: 36.5 (21 May 2025 20:47)  T(F): 97.6 (22 May 2025 04:40), Max: 97.7 (21 May 2025 20:47)  HR: 69 (22 May 2025 04:40) (67 - 85)  BP: 118/66 (22 May 2025 05:00) (110/57 - 152/72)  BP(mean): --  RR: 18 (22 May 2025 04:40) (12 - 18)  SpO2: 97% (22 May 2025 04:40) (96% - 100%)    Parameters below as of 22 May 2025 04:40  Patient On (Oxygen Delivery Method): room air      CAPILLARY BLOOD GLUCOSE      POCT Blood Glucose.: 71 mg/dL (22 May 2025 08:28)  POCT Blood Glucose.: 67 mg/dL (22 May 2025 08:27)  POCT Blood Glucose.: 286 mg/dL (21 May 2025 21:17)  POCT Blood Glucose.: 180 mg/dL (21 May 2025 16:56)  POCT Blood Glucose.: 156 mg/dL (21 May 2025 12:14)    I&O's Summary    21 May 2025 07:01  -  22 May 2025 07:00  --------------------------------------------------------  IN: 390 mL / OUT: 600 mL / NET: -210 mL        PHYSICAL EXAM:  GENERAL: NAD, well-developed  HEAD:  Atraumatic, Normocephalic  EYES: EOMI, PERRLA, conjunctiva and sclera clear  NECK: Supple, No JVD  CHEST/LUNG: Clear to auscultation bilaterally; No wheeze  HEART: Regular rate and rhythm; No murmurs, rubs, or gallops  ABDOMEN: Soft, Nontender, Nondistended; Bowel sounds present  EXTREMITIES:  2+ Peripheral Pulses, No clubbing, cyanosis, or edema  PSYCH: AAOx3  NEUROLOGY: non-focal  SKIN: No rashes or lesions    LABS:                        13.0   7.74  )-----------( 252      ( 21 May 2025 07:19 )             40.2     05-22    132[L]  |  96  |  11  ----------------------------<  106[H]  3.7   |  25  |  0.63    Ca    8.1[L]      22 May 2025 07:13            Urinalysis Basic - ( 22 May 2025 07:13 )    Color: x / Appearance: x / SG: x / pH: x  Gluc: 106 mg/dL / Ketone: x  / Bili: x / Urobili: x   Blood: x / Protein: x / Nitrite: x   Leuk Esterase: x / RBC: x / WBC x   Sq Epi: x / Non Sq Epi: x / Bacteria: x        RADIOLOGY & ADDITIONAL TESTS:    Imaging Personally Reviewed:    Consultant(s) Notes Reviewed:      Care Discussed with Consultants/Other Providers:   DATE OF SERVICE: 05-22-25 @ 11:05    Patient is a 70y old  Female who presents with a chief complaint of Per chart, patient is a 69 y/o female with PMH including HTN, HLD, DM, epilepsy. Patient presents to [facility] w/ one week of epigastric abdominal pain (worsened after eating) LUQ/LLQ & L flank pain, endorsing elevated BG and new pruritic full body rash starting one week PTA. (21 May 2025 12:09)      SUBJECTIVE / OVERNIGHT EVENTS:  No chest pain. No shortness of breath. No complaints. No events overnight.     MEDICATIONS  (STANDING):  aspirin enteric coated 81 milliGRAM(s) Oral daily  ciprofloxacin   IVPB 400 milliGRAM(s) IV Intermittent once  dextrose 5%. 1000 milliLiter(s) (50 mL/Hr) IV Continuous <Continuous>  dextrose 5%. 1000 milliLiter(s) (100 mL/Hr) IV Continuous <Continuous>  dextrose 50% Injectable 25 Gram(s) IV Push once  dextrose 50% Injectable 12.5 Gram(s) IV Push once  dextrose 50% Injectable 25 Gram(s) IV Push once  ezetimibe 10 milliGRAM(s) Oral daily  gabapentin 100 milliGRAM(s) Oral daily  glucagon  Injectable 1 milliGRAM(s) IntraMuscular once  heparin   Injectable 5000 Unit(s) SubCutaneous every 8 hours  indomethacin Suppository 100 milliGRAM(s) Rectal once  insulin glargine Injectable (LANTUS) 10 Unit(s) SubCutaneous at bedtime  insulin lispro (ADMELOG) corrective regimen sliding scale   SubCutaneous three times a day before meals  insulin lispro (ADMELOG) corrective regimen sliding scale   SubCutaneous at bedtime  insulin lispro Injectable (ADMELOG) 5 Unit(s) SubCutaneous three times a day before meals  ketotifen 0.025% Ophthalmic Solution 1 Drop(s) Both EYES two times a day  levETIRAcetam 500 milliGRAM(s) Oral two times a day  metoprolol succinate ER 12.5 milliGRAM(s) Oral daily  pantoprazole    Tablet 40 milliGRAM(s) Oral before breakfast  rosuvastatin 20 milliGRAM(s) Oral at bedtime  saline laxative (FLEET) Rectal Enema 1 Enema Rectal once    MEDICATIONS  (PRN):  dextrose Oral Gel 15 Gram(s) Oral once PRN Blood Glucose LESS THAN 70 milliGRAM(s)/deciliter  dextrose Oral Gel 15 Gram(s) Oral once PRN Blood Glucose LESS THAN 70 milliGRAM(s)/deciliter      Vital Signs Last 24 Hrs  T(C): 36.4 (22 May 2025 04:40), Max: 36.5 (21 May 2025 20:47)  T(F): 97.6 (22 May 2025 04:40), Max: 97.7 (21 May 2025 20:47)  HR: 69 (22 May 2025 04:40) (67 - 85)  BP: 118/66 (22 May 2025 05:00) (110/57 - 152/72)  BP(mean): --  RR: 18 (22 May 2025 04:40) (12 - 18)  SpO2: 97% (22 May 2025 04:40) (96% - 100%)    Parameters below as of 22 May 2025 04:40  Patient On (Oxygen Delivery Method): room air      CAPILLARY BLOOD GLUCOSE      POCT Blood Glucose.: 71 mg/dL (22 May 2025 08:28)  POCT Blood Glucose.: 67 mg/dL (22 May 2025 08:27)  POCT Blood Glucose.: 286 mg/dL (21 May 2025 21:17)  POCT Blood Glucose.: 180 mg/dL (21 May 2025 16:56)  POCT Blood Glucose.: 156 mg/dL (21 May 2025 12:14)    I&O's Summary    21 May 2025 07:01  -  22 May 2025 07:00  --------------------------------------------------------  IN: 390 mL / OUT: 600 mL / NET: -210 mL        PHYSICAL EXAM:  GENERAL: NAD, well-developed  HEAD:  Atraumatic, Normocephalic  EYES: EOMI, PERRLA, conjunctiva and sclera clear  NECK: Supple, No JVD  CHEST/LUNG: Clear to auscultation bilaterally; No wheeze  HEART: Regular rate and rhythm; No murmurs, rubs, or gallops  ABDOMEN: Soft, Nontender, Nondistended; Bowel sounds present  EXTREMITIES:  2+ Peripheral Pulses, No clubbing, cyanosis, or edema  PSYCH: AAOx3  NEUROLOGY: non-focal  SKIN: No rashes or lesions    LABS:                        13.0   7.74  )-----------( 252      ( 21 May 2025 07:19 )             40.2     05-22    132[L]  |  96  |  11  ----------------------------<  106[H]  3.7   |  25  |  0.63    Ca    8.1[L]      22 May 2025 07:13            Urinalysis Basic - ( 22 May 2025 07:13 )    Color: x / Appearance: x / SG: x / pH: x  Gluc: 106 mg/dL / Ketone: x  / Bili: x / Urobili: x   Blood: x / Protein: x / Nitrite: x   Leuk Esterase: x / RBC: x / WBC x   Sq Epi: x / Non Sq Epi: x / Bacteria: x    < from: ERCP (05.21.25 @ 13:25) >  Findings:       The  film was normal. The esophagus was successfully intubated under direct vision. The        scope was advanced to a normal major papilla in the descending duodenum without detailed        examination of the pharynx, larynx and associated structures, and upper GI tract. The upper        GI tract was grossly normal. The bile duct was deeply cannulated with the Hydratome        sphincterotome. Contrast was injected. I personally interpreted the bile duct images. There        was brisk flow of contrast through the ducts. A 5 mm biliary sphincterotomy was made with a        Hydratome sphincterotome using blended current. There was no post-sphincterotomy bleeding.        Dilation of the common bile duct with an 8 mm balloon dilator was successful. The biliary        tree was swept with an 11.5 mm balloon starting at the bifurcation. All stones were removed.       Of note multiple stones noted in gallbladder and cystic duct                                                              Impression:          - Choledocholithiasis was found. Complete removal was accomplished by biliary                        sphincterotomy and balloon extraction.                       - A biliary sphincterotomy was performed.                       - Common bile duct was successfully dilated.                       - The biliary tree was swept.  Recommendation:      - Return patient to [facility] for ongoing care.                       -Advance diet as tolerated.                       - Surgery eval for ccy    < end of copied text >      RADIOLOGY & ADDITIONAL TESTS:    Imaging Personally Reviewed:    Consultant(s) Notes Reviewed:      Care Discussed with Consultants/Other Providers:

## 2025-05-22 NOTE — CONSULT NOTE ADULT - PROBLEM SELECTOR RECOMMENDATION 9
Will start Lantus 10 u at bedtime.  Will start Admelog 5 u before each meal and continue Admelog correction scale coverage. Will continue monitoring FS and FU.   Patient counseled for compliance with consistent low carb diet and exercise as tolerated outpatient.

## 2025-05-22 NOTE — PROGRESS NOTE ADULT - ASSESSMENT
Assessment:  #Choledocholithiasis  #CBD dilation  #Constipation    CT 5/19: Hyperdensity in the distal common bile duct measuring 0.8 cm, suspicious for choledocholithiasis. Biliary duct dilation. Rectum distended with stool  MRCP 5/20:  Cholelithiasis and choledocholithiasis. Biliary ductal dilatation with 8 mm CBD.  ERCP 5/21: Choledocholithiasis was found. Complete removal was accomplished by biliary sphincterotomy and balloon extraction.    Plan:  - Imaging reviewed and discussed with patient  - Enema x1 for constipation ordered on 5/20 for / CT findings of distended rectum with stool and constipation. Please give enema.   - Monitor GI function   - Given recurrent episodes of choledocholithiasis discussed with patient recommendation for ccy to reduce number of ERCPs. Surgery consulted, as per noted, will discuss option later today with pt for possible inpatient lap west.   - Diet as tolerated    Will follow    I reviewed the overnight course of events on the unit, re-confirming the patient history. I discussed the care with the patient   The plan of care was discussed by the nurse practitioner and modifications were made to the notation where appropriate.   Differential diagnosis and plan of care discussed with patient after the evaluation  35 minutes spent on total encounter of which more than fifty percent of the encounter was spent counseling and/or coordinating care with the attending physician.  Advanced care planning was discussed with patient and family.  Advanced care planning forms were reviewed and discussed.  Risks, benefits and alternatives of gastroenterologic procedures/interventions were discussed in detail and all questions were answered.

## 2025-05-22 NOTE — PROGRESS NOTE ADULT - SUBJECTIVE AND OBJECTIVE BOX
[facility]      Martir Suggs NPKeenanC    [address] [place] [address]  [phone]      INTERVAL HPI/OVERNIGHT EVENTS:  Pt s/e  Reports very mild diffuse abd pain. States pain significantly improved post ERCP  Denies N/V  Reports last BM monday    MEDICATIONS  (STANDING):  aspirin enteric coated 81 milliGRAM(s) Oral daily  ciprofloxacin   IVPB 400 milliGRAM(s) IV Intermittent once  dextrose 5%. 1000 milliLiter(s) (50 mL/Hr) IV Continuous <Continuous>  dextrose 5%. 1000 milliLiter(s) (100 mL/Hr) IV Continuous <Continuous>  dextrose 50% Injectable 25 Gram(s) IV Push once  dextrose 50% Injectable 12.5 Gram(s) IV Push once  dextrose 50% Injectable 25 Gram(s) IV Push once  ezetimibe 10 milliGRAM(s) Oral daily  gabapentin 100 milliGRAM(s) Oral daily  glucagon  Injectable 1 milliGRAM(s) IntraMuscular once  heparin   Injectable 5000 Unit(s) SubCutaneous every 8 hours  indomethacin Suppository 100 milliGRAM(s) Rectal once  insulin glargine Injectable (LANTUS) 10 Unit(s) SubCutaneous at bedtime  insulin lispro (ADMELOG) corrective regimen sliding scale   SubCutaneous three times a day before meals  insulin lispro (ADMELOG) corrective regimen sliding scale   SubCutaneous at bedtime  insulin lispro Injectable (ADMELOG) 5 Unit(s) SubCutaneous three times a day before meals  ketotifen 0.025% Ophthalmic Solution 1 Drop(s) Both EYES two times a day  lactulose Syrup 20 Gram(s) Oral every 4 hours  levETIRAcetam 500 milliGRAM(s) Oral two times a day  metoprolol succinate ER 12.5 milliGRAM(s) Oral daily  pantoprazole    Tablet 40 milliGRAM(s) Oral before breakfast  rosuvastatin 20 milliGRAM(s) Oral at bedtime  saline laxative (FLEET) Rectal Enema 1 Enema Rectal once    MEDICATIONS  (PRN):  dextrose Oral Gel 15 Gram(s) Oral once PRN Blood Glucose LESS THAN 70 milliGRAM(s)/deciliter  dextrose Oral Gel 15 Gram(s) Oral once PRN Blood Glucose LESS THAN 70 milliGRAM(s)/deciliter      Allergies    penicillin (Headache)    Intolerances              PHYSICAL EXAM:   Vital Signs:  Vital Signs Last 24 Hrs  T(C): 36.4 (22 May 2025 04:40), Max: 36.5 (21 May 2025 20:47)  T(F): 97.6 (22 May 2025 04:40), Max: 97.7 (21 May 2025 20:47)  HR: 69 (22 May 2025 04:40) (67 - 78)  BP: 118/66 (22 May 2025 05:00) (110/57 - 152/72)  BP(mean): --  RR: 18 (22 May 2025 04:40) (12 - 18)  SpO2: 97% (22 May 2025 04:40) (96% - 100%)    Parameters below as of 22 May 2025 04:40  Patient On (Oxygen Delivery Method): room air      Daily     Daily     GENERAL:  Appears stated age,   HEENT:  NC/AT,    CHEST:  Full & symmetric excursion,   HEART:  Regular rhythm,  ABDOMEN:  Soft, non-tender, non-distended,  EXTEREMITIES:  no cyanosis  SKIN:  No rash  NEURO:  Alert, oriented      LABS:                        13.0   7.74  )-----------( 252      ( 21 May 2025 07:19 )             40.2     05-22    132[L]  |  96  |  11  ----------------------------<  106[H]  3.7   |  25  |  0.63    Ca    8.1[L]      22 May 2025 07:13        Urinalysis Basic - ( 22 May 2025 07:13 )    Color: x / Appearance: x / SG: x / pH: x  Gluc: 106 mg/dL / Ketone: x  / Bili: x / Urobili: x   Blood: x / Protein: x / Nitrite: x   Leuk Esterase: x / RBC: x / WBC x   Sq Epi: x / Non Sq Epi: x / Bacteria: x        RADIOLOGY & ADDITIONAL TESTS:

## 2025-05-22 NOTE — PROGRESS NOTE ADULT - SUBJECTIVE AND OBJECTIVE BOX
[facility]    Dr. Jose Flannery     Office [phone] (9 am to 5 pm)  Service: [phone] (5pm to 9am)  Also Available on TEAMS      RENAL PROGRESS NOTE: DATE OF SERVICE 05-22-25 @ 10:20    Patient is a 70y old  Female who presents with a chief complaint of Per chart, patient is a 71 y/o female with PMH including HTN, HLD, DM, epilepsy. Patient presents to [facility] w/ one week of epigastric abdominal pain (worsened after eating) LUQ/LLQ & L flank pain, endorsing elevated BG and new pruritic full body rash starting one week PTA. (21 May 2025 12:09)      Patient seen and examined at bedside. No chest pain/sob    VITALS:  T(F): 97.6 (05-22-25 @ 04:40), Max: 97.7 (05-21-25 @ 20:47)  HR: 69 (05-22-25 @ 04:40)  BP: 118/66 (05-22-25 @ 05:00)  RR: 18 (05-22-25 @ 04:40)  SpO2: 97% (05-22-25 @ 04:40)  Wt(kg): --    05-21 @ 07:01  -  05-22 @ 07:00  --------------------------------------------------------  IN: 390 mL / OUT: 600 mL / NET: -210 mL      Height (cm): 157.5 (05-21 @ 13:29)  Weight (kg): 39.9 (05-21 @ 13:29)  BMI (kg/m2): 16.1 (05-21 @ 13:29)  BSA (m2): 1.35 (05-21 @ 13:29)    PHYSICAL EXAM:  Constitutional: NAD  Neck: No JVD  Respiratory: CTAB, no wheezes, rales or rhonchi  Cardiovascular: S1, S2, RRR  Gastrointestinal: BS+, soft, NT/ND  Extremities: No peripheral edema    Hospital Medications:   MEDICATIONS  (STANDING):  aspirin enteric coated 81 milliGRAM(s) Oral daily  ciprofloxacin   IVPB 400 milliGRAM(s) IV Intermittent once  dextrose 5%. 1000 milliLiter(s) (50 mL/Hr) IV Continuous <Continuous>  dextrose 5%. 1000 milliLiter(s) (100 mL/Hr) IV Continuous <Continuous>  dextrose 50% Injectable 25 Gram(s) IV Push once  dextrose 50% Injectable 12.5 Gram(s) IV Push once  dextrose 50% Injectable 25 Gram(s) IV Push once  ezetimibe 10 milliGRAM(s) Oral daily  gabapentin 100 milliGRAM(s) Oral daily  glucagon  Injectable 1 milliGRAM(s) IntraMuscular once  heparin   Injectable 5000 Unit(s) SubCutaneous every 8 hours  indomethacin Suppository 100 milliGRAM(s) Rectal once  insulin glargine Injectable (LANTUS) 10 Unit(s) SubCutaneous at bedtime  insulin lispro (ADMELOG) corrective regimen sliding scale   SubCutaneous three times a day before meals  insulin lispro (ADMELOG) corrective regimen sliding scale   SubCutaneous at bedtime  insulin lispro Injectable (ADMELOG) 5 Unit(s) SubCutaneous three times a day before meals  ketotifen 0.025% Ophthalmic Solution 1 Drop(s) Both EYES two times a day  levETIRAcetam 500 milliGRAM(s) Oral two times a day  metoprolol succinate ER 12.5 milliGRAM(s) Oral daily  pantoprazole    Tablet 40 milliGRAM(s) Oral before breakfast  rosuvastatin 20 milliGRAM(s) Oral at bedtime  saline laxative (FLEET) Rectal Enema 1 Enema Rectal once      LABS:  05-22    132[L]  |  96  |  11  ----------------------------<  106[H]  3.7   |  25  |  0.63    Ca    8.1[L]      22 May 2025 07:13      Creatinine Trend: 0.63 <--, 0.57 <--, 0.60 <--, 0.57 <--                                13.0   7.74  )-----------( 252      ( 21 May 2025 07:19 )             40.2     Urine Studies:  Urinalysis - [05-22-25 @ 07:13]      Color  / Appearance  / SG  / pH       Gluc 106 / Ketone   / Bili  / Urobili        Blood  / Protein  / Leuk Est  / Nitrite       RBC  / WBC  / Hyaline  / Gran  / Sq Epi  / Non Sq Epi  / Bacteria     Urine Creatinine 27      [05-21-25 @ 21:15]  Urine Sodium 111      [05-21-25 @ 21:15]  Urine Osmolality 555      [05-21-25 @ 21:15]    TSH 1.66      [05-21-25 @ 07:21]        RADIOLOGY & ADDITIONAL STUDIES:

## 2025-05-23 LAB
AMYLASE P1 CFR SERPL: 112 U/L — SIGNIFICANT CHANGE UP (ref 25–125)
ANION GAP SERPL CALC-SCNC: 11 MMOL/L — SIGNIFICANT CHANGE UP (ref 5–17)
BUN SERPL-MCNC: 9 MG/DL — SIGNIFICANT CHANGE UP (ref 7–23)
CALCIUM SERPL-MCNC: 8.4 MG/DL — SIGNIFICANT CHANGE UP (ref 8.4–10.5)
CHLORIDE SERPL-SCNC: 97 MMOL/L — SIGNIFICANT CHANGE UP (ref 96–108)
CO2 SERPL-SCNC: 24 MMOL/L — SIGNIFICANT CHANGE UP (ref 22–31)
CREAT SERPL-MCNC: 0.48 MG/DL — LOW (ref 0.5–1.3)
EGFR: 102 ML/MIN/1.73M2 — SIGNIFICANT CHANGE UP
EGFR: 102 ML/MIN/1.73M2 — SIGNIFICANT CHANGE UP
GLUCOSE BLDC GLUCOMTR-MCNC: 145 MG/DL — HIGH (ref 70–99)
GLUCOSE BLDC GLUCOMTR-MCNC: 196 MG/DL — HIGH (ref 70–99)
GLUCOSE BLDC GLUCOMTR-MCNC: 275 MG/DL — HIGH (ref 70–99)
GLUCOSE BLDC GLUCOMTR-MCNC: 319 MG/DL — HIGH (ref 70–99)
GLUCOSE SERPL-MCNC: 136 MG/DL — HIGH (ref 70–99)
HCT VFR BLD CALC: 37.6 % — SIGNIFICANT CHANGE UP (ref 34.5–45)
HGB BLD-MCNC: 12.7 G/DL — SIGNIFICANT CHANGE UP (ref 11.5–15.5)
LIDOCAIN IGE QN: 71 U/L — HIGH (ref 7–60)
MCHC RBC-ENTMCNC: 28 PG — SIGNIFICANT CHANGE UP (ref 27–34)
MCHC RBC-ENTMCNC: 33.8 G/DL — SIGNIFICANT CHANGE UP (ref 32–36)
MCV RBC AUTO: 83 FL — SIGNIFICANT CHANGE UP (ref 80–100)
NRBC BLD AUTO-RTO: 0 /100 WBCS — SIGNIFICANT CHANGE UP (ref 0–0)
PLATELET # BLD AUTO: 178 K/UL — SIGNIFICANT CHANGE UP (ref 150–400)
POTASSIUM SERPL-MCNC: 3.7 MMOL/L — SIGNIFICANT CHANGE UP (ref 3.5–5.3)
POTASSIUM SERPL-SCNC: 3.7 MMOL/L — SIGNIFICANT CHANGE UP (ref 3.5–5.3)
RBC # BLD: 4.53 M/UL — SIGNIFICANT CHANGE UP (ref 3.8–5.2)
RBC # FLD: 13.8 % — SIGNIFICANT CHANGE UP (ref 10.3–14.5)
SODIUM SERPL-SCNC: 132 MMOL/L — LOW (ref 135–145)
WBC # BLD: 5.94 K/UL — SIGNIFICANT CHANGE UP (ref 3.8–10.5)
WBC # FLD AUTO: 5.94 K/UL — SIGNIFICANT CHANGE UP (ref 3.8–10.5)

## 2025-05-23 PROCEDURE — 99232 SBSQ HOSP IP/OBS MODERATE 35: CPT | Mod: GC,57

## 2025-05-23 RX ORDER — INSULIN GLARGINE-YFGN 100 [IU]/ML
7 INJECTION, SOLUTION SUBCUTANEOUS ONCE
Refills: 0 | Status: DISCONTINUED | OUTPATIENT
Start: 2025-05-23 | End: 2025-05-23

## 2025-05-23 RX ORDER — INSULIN GLARGINE-YFGN 100 [IU]/ML
7 INJECTION, SOLUTION SUBCUTANEOUS ONCE
Refills: 0 | Status: COMPLETED | OUTPATIENT
Start: 2025-05-23 | End: 2025-05-23

## 2025-05-23 RX ORDER — INSULIN GLARGINE-YFGN 100 [IU]/ML
10 INJECTION, SOLUTION SUBCUTANEOUS AT BEDTIME
Refills: 0 | Status: DISCONTINUED | OUTPATIENT
Start: 2025-05-24 | End: 2025-05-24

## 2025-05-23 RX ORDER — ACETAMINOPHEN 500 MG/5ML
600 LIQUID (ML) ORAL ONCE
Refills: 0 | Status: COMPLETED | OUTPATIENT
Start: 2025-05-23 | End: 2025-05-23

## 2025-05-23 RX ADMIN — GABAPENTIN 100 MILLIGRAM(S): 400 CAPSULE ORAL at 11:18

## 2025-05-23 RX ADMIN — Medication 600 MILLIGRAM(S): at 04:30

## 2025-05-23 RX ADMIN — LEVETIRACETAM 500 MILLIGRAM(S): 10 INJECTION, SOLUTION INTRAVENOUS at 17:32

## 2025-05-23 RX ADMIN — KETOTIFEN FUMARATE 1 DROP(S): 0.35 SOLUTION/ DROPS OPHTHALMIC at 05:07

## 2025-05-23 RX ADMIN — HEPARIN SODIUM 5000 UNIT(S): 1000 INJECTION INTRAVENOUS; SUBCUTANEOUS at 15:22

## 2025-05-23 RX ADMIN — Medication 40 MILLIGRAM(S): at 05:06

## 2025-05-23 RX ADMIN — METOPROLOL SUCCINATE 12.5 MILLIGRAM(S): 50 TABLET, EXTENDED RELEASE ORAL at 05:06

## 2025-05-23 RX ADMIN — INSULIN GLARGINE-YFGN 7 UNIT(S): 100 INJECTION, SOLUTION SUBCUTANEOUS at 22:05

## 2025-05-23 RX ADMIN — KETOTIFEN FUMARATE 1 DROP(S): 0.35 SOLUTION/ DROPS OPHTHALMIC at 22:06

## 2025-05-23 RX ADMIN — Medication 81 MILLIGRAM(S): at 11:18

## 2025-05-23 RX ADMIN — INSULIN LISPRO 5 UNIT(S): 100 INJECTION, SOLUTION INTRAVENOUS; SUBCUTANEOUS at 17:31

## 2025-05-23 RX ADMIN — HEPARIN SODIUM 5000 UNIT(S): 1000 INJECTION INTRAVENOUS; SUBCUTANEOUS at 22:05

## 2025-05-23 RX ADMIN — HEPARIN SODIUM 5000 UNIT(S): 1000 INJECTION INTRAVENOUS; SUBCUTANEOUS at 05:06

## 2025-05-23 RX ADMIN — INSULIN LISPRO 4: 100 INJECTION, SOLUTION INTRAVENOUS; SUBCUTANEOUS at 13:08

## 2025-05-23 RX ADMIN — INSULIN LISPRO 5 UNIT(S): 100 INJECTION, SOLUTION INTRAVENOUS; SUBCUTANEOUS at 08:52

## 2025-05-23 RX ADMIN — INSULIN LISPRO 3: 100 INJECTION, SOLUTION INTRAVENOUS; SUBCUTANEOUS at 17:31

## 2025-05-23 RX ADMIN — LEVETIRACETAM 500 MILLIGRAM(S): 10 INJECTION, SOLUTION INTRAVENOUS at 05:06

## 2025-05-23 RX ADMIN — INSULIN LISPRO 5 UNIT(S): 100 INJECTION, SOLUTION INTRAVENOUS; SUBCUTANEOUS at 13:08

## 2025-05-23 RX ADMIN — EZETIMIBE 10 MILLIGRAM(S): 10 TABLET ORAL at 11:18

## 2025-05-23 RX ADMIN — Medication 240 MILLIGRAM(S): at 03:54

## 2025-05-23 RX ADMIN — ROSUVASTATIN CALCIUM 20 MILLIGRAM(S): 20 TABLET, FILM COATED ORAL at 22:05

## 2025-05-23 NOTE — PROGRESS NOTE ADULT - ATTENDING COMMENTS
ATTENDING ATTESTATION  I have seen and examined this patient on rounds thismorning with the surgery team. I have reviewed all new labs, imaging and reports. I have participated in formulating the plan for the day, and have read and agree with the history, ROS, exam, assessment and plan as stated above.     Post-procedure day 2 from ERCP with sphincterotomy (no stent).   Planned for OR tomorrow (sat May 24) for lap west.     Lashay Winston M.D., M.S.  Division of Acute Care Surgery

## 2025-05-23 NOTE — PHYSICAL THERAPY INITIAL EVALUATION ADULT - PERTINENT HX OF CURRENT PROBLEM, REHAB EVAL
70F PMHx HTN, HLD, Epilepsy on keppra/vimpat p/w abdominal pain. H/o recurrent choledocholithiasis, now planned for interval cholecystectomy

## 2025-05-23 NOTE — PROGRESS NOTE ADULT - SUBJECTIVE AND OBJECTIVE BOX
[facility]      Martir Suggs NPKeenanC    [address] [place] [address]  [phone]      INTERVAL HPI/OVERNIGHT EVENTS:    MEDICATIONS  (STANDING):  aspirin enteric coated 81 milliGRAM(s) Oral daily  ciprofloxacin   IVPB 400 milliGRAM(s) IV Intermittent once  dextrose 5%. 1000 milliLiter(s) (50 mL/Hr) IV Continuous <Continuous>  dextrose 5%. 1000 milliLiter(s) (100 mL/Hr) IV Continuous <Continuous>  dextrose 50% Injectable 25 Gram(s) IV Push once  dextrose 50% Injectable 12.5 Gram(s) IV Push once  dextrose 50% Injectable 25 Gram(s) IV Push once  ezetimibe 10 milliGRAM(s) Oral daily  gabapentin 100 milliGRAM(s) Oral daily  glucagon  Injectable 1 milliGRAM(s) IntraMuscular once  heparin   Injectable 5000 Unit(s) SubCutaneous every 8 hours  indomethacin Suppository 100 milliGRAM(s) Rectal once  insulin glargine Injectable (LANTUS) 10 Unit(s) SubCutaneous at bedtime  insulin lispro (ADMELOG) corrective regimen sliding scale   SubCutaneous three times a day before meals  insulin lispro (ADMELOG) corrective regimen sliding scale   SubCutaneous at bedtime  insulin lispro Injectable (ADMELOG) 5 Unit(s) SubCutaneous three times a day before meals  ketotifen 0.025% Ophthalmic Solution 1 Drop(s) Both EYES two times a day  levETIRAcetam 500 milliGRAM(s) Oral two times a day  metoprolol succinate ER 12.5 milliGRAM(s) Oral daily  pantoprazole    Tablet 40 milliGRAM(s) Oral before breakfast  rosuvastatin 20 milliGRAM(s) Oral at bedtime  saline laxative (FLEET) Rectal Enema 1 Enema Rectal once    MEDICATIONS  (PRN):  dextrose Oral Gel 15 Gram(s) Oral once PRN Blood Glucose LESS THAN 70 milliGRAM(s)/deciliter  dextrose Oral Gel 15 Gram(s) Oral once PRN Blood Glucose LESS THAN 70 milliGRAM(s)/deciliter      Allergies    penicillin (Headache)    Intolerances              PHYSICAL EXAM:   Vital Signs:  Vital Signs Last 24 Hrs  T(C): 36.6 (23 May 2025 05:01), Max: 36.6 (22 May 2025 21:52)  T(F): 97.9 (23 May 2025 05:01), Max: 97.9 (23 May 2025 05:01)  HR: 67 (23 May 2025 05:01) (67 - 86)  BP: 140/70 (23 May 2025 05:01) (120/73 - 140/70)  BP(mean): --  RR: 18 (23 May 2025 05:01) (18 - 18)  SpO2: 97% (23 May 2025 05:01) (95% - 97%)    Parameters below as of 23 May 2025 05:01  Patient On (Oxygen Delivery Method): room air      Daily     Daily     GENERAL:  Appears stated age,   HEENT:  NC/AT,    CHEST:  Full & symmetric excursion,   HEART:  Regular rhythm,  ABDOMEN:  Soft, non-tender, non-distended,  EXTEREMITIES:  no cyanosis  SKIN:  No rash  NEURO:  Alert, oriented      LABS:                        12.7   5.94  )-----------( 178      ( 23 May 2025 06:29 )             37.6     05-23    132[L]  |  97  |  9   ----------------------------<  136[H]  3.7   |  24  |  0.48[L]    Ca    8.4      23 May 2025 06:30        Urinalysis Basic - ( 23 May 2025 06:30 )    Color: x / Appearance: x / SG: x / pH: x  Gluc: 136 mg/dL / Ketone: x  / Bili: x / Urobili: x   Blood: x / Protein: x / Nitrite: x   Leuk Esterase: x / RBC: x / WBC x   Sq Epi: x / Non Sq Epi: x / Bacteria: x        RADIOLOGY & ADDITIONAL TESTS:   [facility]      Martir Suggs NP-C    [address] [place] [address]  [phone]      INTERVAL HPI/OVERNIGHT EVENTS:  Pt s/e  C/o diffuse 9/10 abd pain. States pain same as what she originally presented to hospital with but worsened last night  Denies N/V  Afebrile  Had BM last night    MEDICATIONS  (STANDING):  aspirin enteric coated 81 milliGRAM(s) Oral daily  ciprofloxacin   IVPB 400 milliGRAM(s) IV Intermittent once  dextrose 5%. 1000 milliLiter(s) (50 mL/Hr) IV Continuous <Continuous>  dextrose 5%. 1000 milliLiter(s) (100 mL/Hr) IV Continuous <Continuous>  dextrose 50% Injectable 25 Gram(s) IV Push once  dextrose 50% Injectable 12.5 Gram(s) IV Push once  dextrose 50% Injectable 25 Gram(s) IV Push once  ezetimibe 10 milliGRAM(s) Oral daily  gabapentin 100 milliGRAM(s) Oral daily  glucagon  Injectable 1 milliGRAM(s) IntraMuscular once  heparin   Injectable 5000 Unit(s) SubCutaneous every 8 hours  indomethacin Suppository 100 milliGRAM(s) Rectal once  insulin glargine Injectable (LANTUS) 10 Unit(s) SubCutaneous at bedtime  insulin lispro (ADMELOG) corrective regimen sliding scale   SubCutaneous three times a day before meals  insulin lispro (ADMELOG) corrective regimen sliding scale   SubCutaneous at bedtime  insulin lispro Injectable (ADMELOG) 5 Unit(s) SubCutaneous three times a day before meals  ketotifen 0.025% Ophthalmic Solution 1 Drop(s) Both EYES two times a day  levETIRAcetam 500 milliGRAM(s) Oral two times a day  metoprolol succinate ER 12.5 milliGRAM(s) Oral daily  pantoprazole    Tablet 40 milliGRAM(s) Oral before breakfast  rosuvastatin 20 milliGRAM(s) Oral at bedtime  saline laxative (FLEET) Rectal Enema 1 Enema Rectal once    MEDICATIONS  (PRN):  dextrose Oral Gel 15 Gram(s) Oral once PRN Blood Glucose LESS THAN 70 milliGRAM(s)/deciliter  dextrose Oral Gel 15 Gram(s) Oral once PRN Blood Glucose LESS THAN 70 milliGRAM(s)/deciliter      Allergies    penicillin (Headache)    Intolerances              PHYSICAL EXAM:   Vital Signs:  Vital Signs Last 24 Hrs  T(C): 36.6 (23 May 2025 05:01), Max: 36.6 (22 May 2025 21:52)  T(F): 97.9 (23 May 2025 05:01), Max: 97.9 (23 May 2025 05:01)  HR: 67 (23 May 2025 05:01) (67 - 86)  BP: 140/70 (23 May 2025 05:01) (120/73 - 140/70)  BP(mean): --  RR: 18 (23 May 2025 05:01) (18 - 18)  SpO2: 97% (23 May 2025 05:01) (95% - 97%)    Parameters below as of 23 May 2025 05:01  Patient On (Oxygen Delivery Method): room air      Daily     Daily     GENERAL:  Appears stated age,   HEENT:  NC/AT,    CHEST:  Full & symmetric excursion,   HEART:  Regular rhythm,  ABDOMEN:  Soft, non-tender, non-distended,  EXTEREMITIES:  no cyanosis  SKIN:  No rash  NEURO:  Alert, oriented      LABS:                        12.7   5.94  )-----------( 178      ( 23 May 2025 06:29 )             37.6     05-23    132[L]  |  97  |  9   ----------------------------<  136[H]  3.7   |  24  |  0.48[L]    Ca    8.4      23 May 2025 06:30        Urinalysis Basic - ( 23 May 2025 06:30 )    Color: x / Appearance: x / SG: x / pH: x  Gluc: 136 mg/dL / Ketone: x  / Bili: x / Urobili: x   Blood: x / Protein: x / Nitrite: x   Leuk Esterase: x / RBC: x / WBC x   Sq Epi: x / Non Sq Epi: x / Bacteria: x        RADIOLOGY & ADDITIONAL TESTS:

## 2025-05-23 NOTE — PROGRESS NOTE ADULT - ASSESSMENT
71 yo F pmhx of IDDM, HTN, HLD, Epilepsy on keppra/vimpat presents for one week of epigastric abdominal pain (worse after eating) and LUQ/LLQ and L flank pain. Patient states that she has been having elevated blood sugars and endorses new pruritic full body rash that began one week ago. Denies fever, chills, chest pain, shortness of breath, new allergens, nausea, vomiting, hematuria, dysuria, hematochezia, constipation, or any other symptoms at this time    choledocholithiases/cholecystitis  - MRCP done  - ERCP done  - needs lap west  - scheduled for tomorrow  - pt has no medical contraindications for the planned procedure    hyponatremia improved  - nephrology consult following  - fluid restrict to 1.5 L per day    seizure d/o  - c/w keppra    neuropathy  - c/w gabapentin    diabetes  - fs qid  - hgb a1c 8.7  - insulin ss  - lantus 10 units qhs started  - endo consult    constipation  - senna   - mirallax  - lactulose    HLD  - c/w statin  - c/w zetia    dvt px  - sq heparin

## 2025-05-23 NOTE — PROGRESS NOTE ADULT - ASSESSMENT
69 y/o female with PMH including HTN, HLD, DM, epilepsy. Patient presents to [facility] w/ one week of epigastric abdominal pain (worsened after eating) LUQ/LLQ & L flank pain, endorsing elevated BG and new pruritic full body rash    Assessment  DMT2: 70y Female with DM T2 with hyperglycemia, A1C 8.7% , was on insulin at home, now on basal bolus insulin with coverage, blood sugars running high, labile.   Abd pain: on medications, GI eval, monitored.  HTN: on antihypertensive medications, monitored, asymptomatic.      Discussed plan and management wit Dr Titi Walls MD  Cell: [phone]  Office: [phone]             69 y/o female with PMH including HTN, HLD, DM, epilepsy. Patient presents to [facility] w/ one week of epigastric abdominal pain (worsened after eating) LUQ/LLQ & L flank pain, endorsing elevated BG and new pruritic full body rash      Assessment  DMT2: 70y Female with DM T2 with hyperglycemia, A1C 8.7% , was on insulin at home, now on basal bolus insulin with coverage, blood sugars running high, labile.   Abd pain: on medications, GI eval, monitored.  HTN: on antihypertensive medications, monitored, asymptomatic.      Discussed plan and management wit Dr Titi Walls MD  Cell: [phone]  Office: [phone]

## 2025-05-23 NOTE — PROGRESS NOTE ADULT - ASSESSMENT
71 yo F pmhx of IDDM, HTN, HLD, Epilepsy on keppra/vimpat presents for one week of epigastric abdominal pain (worse after eating) and LUQ/LLQ and L flank pain. Nephro called for hyponatremia    Hyponatremia and Pseudohyponatremia  Etiology multifactorial could be hyperglycemia, pain ([place])  Corrected for glucose sodium is 132-134 stable  Advised fluid restrict to 1.5 L per day  Needs better control of hyperglycemia  Urine studies pending  Monitor urine OP    Acidosis  Likely in the setting of Hyperglycemia worsening  Better  Monitor  Correct hyperglycemia    Abdominal pain  Per primary  S/p ERCP and removal of bile stones  Planned for lap west on 05/24

## 2025-05-23 NOTE — PROGRESS NOTE ADULT - SUBJECTIVE AND OBJECTIVE BOX
[facility]    Dr. Jose Flannery     Office [phone] (9 am to 5 pm)  Service: [phone] (5pm to 9am)  Also Available on TEAMS      RENAL PROGRESS NOTE: DATE OF SERVICE 05-23-25 @ 12:48    Patient is a 70y old  Female who presents with a chief complaint of abd pain (23 May 2025 10:20)      Patient seen and examined at bedside. No chest pain/sob    VITALS:  T(F): 97.9 (05-23-25 @ 05:01), Max: 97.9 (05-23-25 @ 05:01)  HR: 67 (05-23-25 @ 05:01)  BP: 140/70 (05-23-25 @ 05:01)  RR: 18 (05-23-25 @ 05:01)  SpO2: 97% (05-23-25 @ 05:01)  Wt(kg): --    05-22 @ 07:01  -  05-23 @ 07:00  --------------------------------------------------------  IN: 390 mL / OUT: 0 mL / NET: 390 mL          PHYSICAL EXAM:  Constitutional: NAD  Neck: No JVD  Respiratory: CTAB, no wheezes, rales or rhonchi  Cardiovascular: S1, S2, RRR  Gastrointestinal: BS+, soft, NT/ND  Extremities: No peripheral edema    Hospital Medications:   MEDICATIONS  (STANDING):  aspirin enteric coated 81 milliGRAM(s) Oral daily  ciprofloxacin   IVPB 400 milliGRAM(s) IV Intermittent once  dextrose 5%. 1000 milliLiter(s) (50 mL/Hr) IV Continuous <Continuous>  dextrose 5%. 1000 milliLiter(s) (100 mL/Hr) IV Continuous <Continuous>  dextrose 50% Injectable 25 Gram(s) IV Push once  dextrose 50% Injectable 12.5 Gram(s) IV Push once  dextrose 50% Injectable 25 Gram(s) IV Push once  ezetimibe 10 milliGRAM(s) Oral daily  gabapentin 100 milliGRAM(s) Oral daily  glucagon  Injectable 1 milliGRAM(s) IntraMuscular once  heparin   Injectable 5000 Unit(s) SubCutaneous every 8 hours  indomethacin Suppository 100 milliGRAM(s) Rectal once  insulin glargine Injectable (LANTUS) 10 Unit(s) SubCutaneous at bedtime  insulin lispro (ADMELOG) corrective regimen sliding scale   SubCutaneous three times a day before meals  insulin lispro (ADMELOG) corrective regimen sliding scale   SubCutaneous at bedtime  insulin lispro Injectable (ADMELOG) 5 Unit(s) SubCutaneous three times a day before meals  ketotifen 0.025% Ophthalmic Solution 1 Drop(s) Both EYES two times a day  levETIRAcetam 500 milliGRAM(s) Oral two times a day  metoprolol succinate ER 12.5 milliGRAM(s) Oral daily  pantoprazole    Tablet 40 milliGRAM(s) Oral before breakfast  rosuvastatin 20 milliGRAM(s) Oral at bedtime  saline laxative (FLEET) Rectal Enema 1 Enema Rectal once      LABS:  05-23    132[L]  |  97  |  9   ----------------------------<  136[H]  3.7   |  24  |  0.48[L]    Ca    8.4      23 May 2025 06:30      Creatinine Trend: 0.48 <--, 0.63 <--, 0.57 <--, 0.60 <--, 0.57 <--                                12.7   5.94  )-----------( 178      ( 23 May 2025 06:29 )             37.6     Urine Studies:  Urinalysis - [05-23-25 @ 06:30]      Color  / Appearance  / SG  / pH       Gluc 136 / Ketone   / Bili  / Urobili        Blood  / Protein  / Leuk Est  / Nitrite       RBC  / WBC  / Hyaline  / Gran  / Sq Epi  / Non Sq Epi  / Bacteria     Urine Creatinine 27      [05-21-25 @ 21:15]  Urine Sodium 111      [05-21-25 @ 21:15]  Urine Osmolality 555      [05-21-25 @ 21:15]    TSH 1.66      [05-21-25 @ 07:21]        RADIOLOGY & ADDITIONAL STUDIES:

## 2025-05-23 NOTE — PROGRESS NOTE ADULT - SUBJECTIVE AND OBJECTIVE BOX
Chief complaint  Patient is a 70y old  Female who presents with a chief complaint of abd pain (23 May 2025 10:20)         Labs and Fingersticks  CAPILLARY BLOOD GLUCOSE      POCT Blood Glucose.: 319 mg/dL (23 May 2025 12:46)  POCT Blood Glucose.: 145 mg/dL (23 May 2025 08:15)  POCT Blood Glucose.: 131 mg/dL (22 May 2025 22:56)  POCT Blood Glucose.: 78 mg/dL (22 May 2025 22:03)  POCT Blood Glucose.: 196 mg/dL (22 May 2025 16:51)      Anion Gap: 11 (05-23 @ 06:30)  Anion Gap: 11 (05-22 @ 07:13)      Calcium: 8.4 (05-23 @ 06:30)  Calcium: 8.1 *L* (05-22 @ 07:13)          05-23    132[L]  |  97  |  9   ----------------------------<  136[H]  3.7   |  24  |  0.48[L]    Ca    8.4      23 May 2025 06:30                          12.7   5.94  )-----------( 178      ( 23 May 2025 06:29 )             37.6     Medications  MEDICATIONS  (STANDING):  aspirin enteric coated 81 milliGRAM(s) Oral daily  ciprofloxacin   IVPB 400 milliGRAM(s) IV Intermittent once  dextrose 5%. 1000 milliLiter(s) (50 mL/Hr) IV Continuous <Continuous>  dextrose 5%. 1000 milliLiter(s) (100 mL/Hr) IV Continuous <Continuous>  dextrose 50% Injectable 25 Gram(s) IV Push once  dextrose 50% Injectable 12.5 Gram(s) IV Push once  dextrose 50% Injectable 25 Gram(s) IV Push once  ezetimibe 10 milliGRAM(s) Oral daily  gabapentin 100 milliGRAM(s) Oral daily  glucagon  Injectable 1 milliGRAM(s) IntraMuscular once  heparin   Injectable 5000 Unit(s) SubCutaneous every 8 hours  indomethacin Suppository 100 milliGRAM(s) Rectal once  insulin glargine Injectable (LANTUS) 10 Unit(s) SubCutaneous at bedtime  insulin lispro (ADMELOG) corrective regimen sliding scale   SubCutaneous three times a day before meals  insulin lispro (ADMELOG) corrective regimen sliding scale   SubCutaneous at bedtime  insulin lispro Injectable (ADMELOG) 5 Unit(s) SubCutaneous three times a day before meals  ketotifen 0.025% Ophthalmic Solution 1 Drop(s) Both EYES two times a day  levETIRAcetam 500 milliGRAM(s) Oral two times a day  metoprolol succinate ER 12.5 milliGRAM(s) Oral daily  pantoprazole    Tablet 40 milliGRAM(s) Oral before breakfast  rosuvastatin 20 milliGRAM(s) Oral at bedtime  saline laxative (FLEET) Rectal Enema 1 Enema Rectal once      Physical Exam  General: Patient comfortable in bed   Vital Signs Last 12 Hrs  T(F): 97.4 (05-23-25 @ 12:23), Max: 97.9 (05-23-25 @ 05:01)  HR: 82 (05-23-25 @ 12:23) (67 - 82)  BP: 106/67 (05-23-25 @ 12:23) (106/67 - 140/70)  BP(mean): --  RR: 18 (05-23-25 @ 12:23) (18 - 18)  SpO2: 98% (05-23-25 @ 12:23) (97% - 98%)    CVS: S1S2   Respiratory: No wheezing, no crepitations  GI: Abdomen soft, bowel sounds positive  Musculoskeletal:  moves all extremities  : Voiding       Chief complaint  Patient is a 70y old  Female who presents with a chief complaint of abd pain (23 May 2025 10:20)         Labs and Fingersticks  CAPILLARY BLOOD GLUCOSE      POCT Blood Glucose.: 319 mg/dL (23 May 2025 12:46)  POCT Blood Glucose.: 145 mg/dL (23 May 2025 08:15)  POCT Blood Glucose.: 131 mg/dL (22 May 2025 22:56)  POCT Blood Glucose.: 78 mg/dL (22 May 2025 22:03)  POCT Blood Glucose.: 196 mg/dL (22 May 2025 16:51)      Anion Gap: 11 (05-23 @ 06:30)  Anion Gap: 11 (05-22 @ 07:13)      Calcium: 8.4 (05-23 @ 06:30)  Calcium: 8.1 *L* (05-22 @ 07:13)          05-23    132[L]  |  97  |  9   ----------------------------<  136[H]  3.7   |  24  |  0.48[L]    Ca    8.4      23 May 2025 06:30                          12.7   5.94  )-----------( 178      ( 23 May 2025 06:29 )             37.6     Medications  MEDICATIONS  (STANDING):  aspirin enteric coated 81 milliGRAM(s) Oral daily  ciprofloxacin   IVPB 400 milliGRAM(s) IV Intermittent once  dextrose 5%. 1000 milliLiter(s) (50 mL/Hr) IV Continuous <Continuous>  dextrose 5%. 1000 milliLiter(s) (100 mL/Hr) IV Continuous <Continuous>  dextrose 50% Injectable 25 Gram(s) IV Push once  dextrose 50% Injectable 12.5 Gram(s) IV Push once  dextrose 50% Injectable 25 Gram(s) IV Push once  ezetimibe 10 milliGRAM(s) Oral daily  gabapentin 100 milliGRAM(s) Oral daily  glucagon  Injectable 1 milliGRAM(s) IntraMuscular once  heparin   Injectable 5000 Unit(s) SubCutaneous every 8 hours  indomethacin Suppository 100 milliGRAM(s) Rectal once  insulin glargine Injectable (LANTUS) 10 Unit(s) SubCutaneous at bedtime  insulin lispro (ADMELOG) corrective regimen sliding scale   SubCutaneous three times a day before meals  insulin lispro (ADMELOG) corrective regimen sliding scale   SubCutaneous at bedtime  insulin lispro Injectable (ADMELOG) 5 Unit(s) SubCutaneous three times a day before meals  ketotifen 0.025% Ophthalmic Solution 1 Drop(s) Both EYES two times a day  levETIRAcetam 500 milliGRAM(s) Oral two times a day  metoprolol succinate ER 12.5 milliGRAM(s) Oral daily  pantoprazole    Tablet 40 milliGRAM(s) Oral before breakfast  rosuvastatin 20 milliGRAM(s) Oral at bedtime  saline laxative (FLEET) Rectal Enema 1 Enema Rectal once      Physical Exam  General: Patient comfortable in bed   Vital Signs Last 12 Hrs  T(F): 97.4 (05-23-25 @ 12:23), Max: 97.9 (05-23-25 @ 05:01)  HR: 82 (05-23-25 @ 12:23) (67 - 82)  BP: 106/67 (05-23-25 @ 12:23) (106/67 - 140/70)  BP(mean): --  RR: 18 (05-23-25 @ 12:23) (18 - 18)  SpO2: 98% (05-23-25 @ 12:23) (97% - 98%)    CVS: S1S2   Respiratory: No wheezing, no crepitations  GI: Abdomen soft, bowel sounds positive  Musculoskeletal:  moves all extremities  : Voiding

## 2025-05-23 NOTE — PROGRESS NOTE ADULT - ASSESSMENT
tenatAssessment:  #Choledocholithiasis  #CBD dilation  #Constipation    CT 5/19: Hyperdensity in the distal common bile duct measuring 0.8 cm, suspicious for choledocholithiasis. Biliary duct dilation. Rectum distended with stool  MRCP 5/20:  Cholelithiasis and choledocholithiasis. Biliary ductal dilatation with 8 mm CBD.  ERCP 5/21: Choledocholithiasis was found. Complete removal was accomplished by biliary sphincterotomy and balloon extraction.    Plan:  - Imaging reviewed and discussed with patient  - +Bm after lactulose  - Monitor GI function   - Given recurrent episodes of choledocholithiasis discussed with patient recommendation for ccy to reduce number of ERCPs. Surgery consulted, tentatively planned for ccy tomorrow.   - post ERCP abd pain today. Will order amylase/lipase to r/o pancreatitis although, pain most likely r/t sphincterotomy pain  - Diet as tolerated    Will follow tenatAssessment:  #Choledocholithiasis  #CBD dilation  #Constipation    CT 5/19: Hyperdensity in the distal common bile duct measuring 0.8 cm, suspicious for choledocholithiasis. Biliary duct dilation. Rectum distended with stool  MRCP 5/20:  Cholelithiasis and choledocholithiasis. Biliary ductal dilatation with 8 mm CBD.  ERCP 5/21: Choledocholithiasis was found. Complete removal was accomplished by biliary sphincterotomy and balloon extraction.    Plan:  - Imaging reviewed and discussed with patient  - +Bm after lactulose  - Monitor GI function   - Daily bowel regimen. Miralax daily. Senna qhs  - Given recurrent episodes of choledocholithiasis discussed with patient recommendation for ccy to reduce number of ERCPs. Surgery consulted, tentatively planned for ccy tomorrow.   - post ERCP abd pain today. Will order amylase/lipase to r/o pancreatitis. Await results    will follow  I reviewed the overnight course of events on the unit, re-confirming the patient history. I discussed the care with the patient   The plan of care was discussed by the nurse practitioner and modifications were made to the notation where appropriate.   Differential diagnosis and plan of care discussed with patient after the evaluation  35 minutes spent on total encounter of which more than fifty percent of the encounter was spent counseling and/or coordinating care with the attending physician.  Advanced care planning was discussed with patient and family.  Advanced care planning forms were reviewed and discussed.  Risks, benefits and alternatives of gastroenterologic procedures/interventions were discussed in detail and all questions were answered.

## 2025-05-23 NOTE — PHYSICAL THERAPY INITIAL EVALUATION ADULT - ADDITIONAL COMMENTS
IND without AD at baseline, lives with brother in house with 1 step to enter, 1 flight of stairs to 2nd floor bedroom; no hx of falls x3 months

## 2025-05-23 NOTE — PROGRESS NOTE ADULT - SUBJECTIVE AND OBJECTIVE BOX
Surgery Progress Note:    OVERNIGHT EVENTS: NAEO    SUBJECTIVE: Pt seen and examined at bedside. Patient comfortable and in no-apparent distress. Pt reporting lower abdominal pain. Otherwise tolerating diet. Pt now amenable to cholecystectomy.     MEDICATIONS  (STANDING):  aspirin enteric coated 81 milliGRAM(s) Oral daily  ciprofloxacin   IVPB 400 milliGRAM(s) IV Intermittent once  dextrose 5%. 1000 milliLiter(s) (50 mL/Hr) IV Continuous <Continuous>  dextrose 5%. 1000 milliLiter(s) (100 mL/Hr) IV Continuous <Continuous>  dextrose 50% Injectable 25 Gram(s) IV Push once  dextrose 50% Injectable 12.5 Gram(s) IV Push once  dextrose 50% Injectable 25 Gram(s) IV Push once  ezetimibe 10 milliGRAM(s) Oral daily  gabapentin 100 milliGRAM(s) Oral daily  glucagon  Injectable 1 milliGRAM(s) IntraMuscular once  heparin   Injectable 5000 Unit(s) SubCutaneous every 8 hours  indomethacin Suppository 100 milliGRAM(s) Rectal once  insulin glargine Injectable (LANTUS) 10 Unit(s) SubCutaneous at bedtime  insulin lispro (ADMELOG) corrective regimen sliding scale   SubCutaneous three times a day before meals  insulin lispro (ADMELOG) corrective regimen sliding scale   SubCutaneous at bedtime  insulin lispro Injectable (ADMELOG) 5 Unit(s) SubCutaneous three times a day before meals  ketotifen 0.025% Ophthalmic Solution 1 Drop(s) Both EYES two times a day  levETIRAcetam 500 milliGRAM(s) Oral two times a day  metoprolol succinate ER 12.5 milliGRAM(s) Oral daily  pantoprazole    Tablet 40 milliGRAM(s) Oral before breakfast  rosuvastatin 20 milliGRAM(s) Oral at bedtime  saline laxative (FLEET) Rectal Enema 1 Enema Rectal once    MEDICATIONS  (PRN):  dextrose Oral Gel 15 Gram(s) Oral once PRN Blood Glucose LESS THAN 70 milliGRAM(s)/deciliter  dextrose Oral Gel 15 Gram(s) Oral once PRN Blood Glucose LESS THAN 70 milliGRAM(s)/deciliter    T(C): 36.6 (05-23-25 @ 05:01), Max: 36.6 (05-22-25 @ 21:52)  HR: 67 (05-23-25 @ 05:01) (67 - 86)  BP: 140/70 (05-23-25 @ 05:01) (120/73 - 140/70)  RR: 18 (05-23-25 @ 05:01) (18 - 18)  SpO2: 97% (05-23-25 @ 05:01) (95% - 97%)    05-22-25 @ 07:01  -  05-23-25 @ 07:00  --------------------------------------------------------  IN: 390 mL / OUT: 0 mL / NET: 390 mL      LABS:                        12.7   5.94  )-----------( 178      ( 23 May 2025 06:29 )             37.6     05-23    132[L]  |  97  |  9   ----------------------------<  136[H]  3.7   |  24  |  0.48[L]    Ca    8.4      23 May 2025 06:30        Urinalysis Basic - ( 23 May 2025 06:30 )    Color: x / Appearance: x / SG: x / pH: x  Gluc: 136 mg/dL / Ketone: x  / Bili: x / Urobili: x   Blood: x / Protein: x / Nitrite: x   Leuk Esterase: x / RBC: x / WBC x   Sq Epi: x / Non Sq Epi: x / Bacteria: x      PE:  Gen: NAD  CV: Pulse regular present  Resp: Nonlabored  Abdomen: Soft, nondistended, mildly tender to RLQ and LLQ

## 2025-05-23 NOTE — PROGRESS NOTE ADULT - SUBJECTIVE AND OBJECTIVE BOX
DATE OF SERVICE: 05-23-25 @ 10:22    Patient is a 70y old  Female who presents with a chief complaint of Per chart, patient is a 71 y/o female with PMH including HTN, HLD, DM, epilepsy. Patient presents to [facility] w/ one week of epigastric abdominal pain (worsened after eating) LUQ/LLQ & L flank pain, endorsing elevated BG and new pruritic full body rash starting one week PTA. (21 May 2025 12:09)      SUBJECTIVE / OVERNIGHT EVENTS:  No chest pain. No shortness of breath. No complaints. No events overnight.     MEDICATIONS  (STANDING):  aspirin enteric coated 81 milliGRAM(s) Oral daily  ciprofloxacin   IVPB 400 milliGRAM(s) IV Intermittent once  dextrose 5%. 1000 milliLiter(s) (50 mL/Hr) IV Continuous <Continuous>  dextrose 5%. 1000 milliLiter(s) (100 mL/Hr) IV Continuous <Continuous>  dextrose 50% Injectable 25 Gram(s) IV Push once  dextrose 50% Injectable 12.5 Gram(s) IV Push once  dextrose 50% Injectable 25 Gram(s) IV Push once  ezetimibe 10 milliGRAM(s) Oral daily  gabapentin 100 milliGRAM(s) Oral daily  glucagon  Injectable 1 milliGRAM(s) IntraMuscular once  heparin   Injectable 5000 Unit(s) SubCutaneous every 8 hours  indomethacin Suppository 100 milliGRAM(s) Rectal once  insulin glargine Injectable (LANTUS) 10 Unit(s) SubCutaneous at bedtime  insulin lispro (ADMELOG) corrective regimen sliding scale   SubCutaneous three times a day before meals  insulin lispro (ADMELOG) corrective regimen sliding scale   SubCutaneous at bedtime  insulin lispro Injectable (ADMELOG) 5 Unit(s) SubCutaneous three times a day before meals  ketotifen 0.025% Ophthalmic Solution 1 Drop(s) Both EYES two times a day  levETIRAcetam 500 milliGRAM(s) Oral two times a day  metoprolol succinate ER 12.5 milliGRAM(s) Oral daily  pantoprazole    Tablet 40 milliGRAM(s) Oral before breakfast  rosuvastatin 20 milliGRAM(s) Oral at bedtime  saline laxative (FLEET) Rectal Enema 1 Enema Rectal once    MEDICATIONS  (PRN):  dextrose Oral Gel 15 Gram(s) Oral once PRN Blood Glucose LESS THAN 70 milliGRAM(s)/deciliter  dextrose Oral Gel 15 Gram(s) Oral once PRN Blood Glucose LESS THAN 70 milliGRAM(s)/deciliter      Vital Signs Last 24 Hrs  T(C): 36.6 (23 May 2025 05:01), Max: 36.6 (22 May 2025 21:52)  T(F): 97.9 (23 May 2025 05:01), Max: 97.9 (23 May 2025 05:01)  HR: 67 (23 May 2025 05:01) (67 - 86)  BP: 140/70 (23 May 2025 05:01) (120/73 - 140/70)  BP(mean): --  RR: 18 (23 May 2025 05:01) (18 - 18)  SpO2: 97% (23 May 2025 05:01) (95% - 97%)    Parameters below as of 23 May 2025 05:01  Patient On (Oxygen Delivery Method): room air      CAPILLARY BLOOD GLUCOSE      POCT Blood Glucose.: 145 mg/dL (23 May 2025 08:15)  POCT Blood Glucose.: 131 mg/dL (22 May 2025 22:56)  POCT Blood Glucose.: 78 mg/dL (22 May 2025 22:03)  POCT Blood Glucose.: 196 mg/dL (22 May 2025 16:51)  POCT Blood Glucose.: 422 mg/dL (22 May 2025 13:22)  POCT Blood Glucose.: 507 mg/dL (22 May 2025 12:45)  POCT Blood Glucose.: 512 mg/dL (22 May 2025 12:45)  POCT Blood Glucose.: 490 mg/dL (22 May 2025 12:44)    I&O's Summary    22 May 2025 07:01  -  23 May 2025 07:00  --------------------------------------------------------  IN: 390 mL / OUT: 0 mL / NET: 390 mL        PHYSICAL EXAM:  GENERAL: NAD, well-developed  HEAD:  Atraumatic, Normocephalic  EYES: EOMI, PERRLA, conjunctiva and sclera clear  NECK: Supple, No JVD  CHEST/LUNG: Clear to auscultation bilaterally; No wheeze  HEART: Regular rate and rhythm; No murmurs, rubs, or gallops  ABDOMEN: Soft, Nontender, Nondistended; Bowel sounds present  EXTREMITIES:  2+ Peripheral Pulses, No clubbing, cyanosis, or edema  PSYCH: AAOx3  NEUROLOGY: non-focal  SKIN: No rashes or lesions    LABS:                        12.7   5.94  )-----------( 178      ( 23 May 2025 06:29 )             37.6     05-23    132[L]  |  97  |  9   ----------------------------<  136[H]  3.7   |  24  |  0.48[L]    Ca    8.4      23 May 2025 06:30            Urinalysis Basic - ( 23 May 2025 06:30 )    Color: x / Appearance: x / SG: x / pH: x  Gluc: 136 mg/dL / Ketone: x  / Bili: x / Urobili: x   Blood: x / Protein: x / Nitrite: x   Leuk Esterase: x / RBC: x / WBC x   Sq Epi: x / Non Sq Epi: x / Bacteria: x        RADIOLOGY & ADDITIONAL TESTS:    Imaging Personally Reviewed:    Consultant(s) Notes Reviewed:      Care Discussed with Consultants/Other Providers:

## 2025-05-23 NOTE — PROGRESS NOTE ADULT - ASSESSMENT
71 yo F pmhx of IDDM, HTN, HLD, Epilepsy on keppra/vimpat presents for one week of epigastric abdominal pain, found to have choledocholithiases now status post ERCP with removal and balloon extraction of stones. General Surgery was consulted for interval CCY. Pt now amenable to surgery.    Recommendations:   - Will add on and consent for Laparoscopic cholecystectomy tomorrow 5/24  - Pre op labs  - NPO at midnight   -Rest of care per primary team     ACS/Trauma  [phone] (pager)

## 2025-05-24 ENCOUNTER — TRANSCRIPTION ENCOUNTER (OUTPATIENT)
Age: 70
End: 2025-05-24

## 2025-05-24 LAB
ANION GAP SERPL CALC-SCNC: 11 MMOL/L — SIGNIFICANT CHANGE UP (ref 5–17)
APTT BLD: 28.8 SEC — SIGNIFICANT CHANGE UP (ref 26.1–36.8)
BLD GP AB SCN SERPL QL: NEGATIVE — SIGNIFICANT CHANGE UP
BUN SERPL-MCNC: 9 MG/DL — SIGNIFICANT CHANGE UP (ref 7–23)
CALCIUM SERPL-MCNC: 9 MG/DL — SIGNIFICANT CHANGE UP (ref 8.4–10.5)
CHLORIDE SERPL-SCNC: 97 MMOL/L — SIGNIFICANT CHANGE UP (ref 96–108)
CO2 SERPL-SCNC: 25 MMOL/L — SIGNIFICANT CHANGE UP (ref 22–31)
CREAT SERPL-MCNC: 0.52 MG/DL — SIGNIFICANT CHANGE UP (ref 0.5–1.3)
EGFR: 100 ML/MIN/1.73M2 — SIGNIFICANT CHANGE UP
EGFR: 100 ML/MIN/1.73M2 — SIGNIFICANT CHANGE UP
GLUCOSE BLDC GLUCOMTR-MCNC: 213 MG/DL — HIGH (ref 70–99)
GLUCOSE BLDC GLUCOMTR-MCNC: 240 MG/DL — HIGH (ref 70–99)
GLUCOSE BLDC GLUCOMTR-MCNC: 290 MG/DL — HIGH (ref 70–99)
GLUCOSE BLDC GLUCOMTR-MCNC: 318 MG/DL — HIGH (ref 70–99)
GLUCOSE SERPL-MCNC: 246 MG/DL — HIGH (ref 70–99)
HCT VFR BLD CALC: 37.1 % — SIGNIFICANT CHANGE UP (ref 34.5–45)
HGB BLD-MCNC: 12.9 G/DL — SIGNIFICANT CHANGE UP (ref 11.5–15.5)
INR BLD: 0.96 RATIO — SIGNIFICANT CHANGE UP (ref 0.85–1.16)
MCHC RBC-ENTMCNC: 28.7 PG — SIGNIFICANT CHANGE UP (ref 27–34)
MCHC RBC-ENTMCNC: 34.8 G/DL — SIGNIFICANT CHANGE UP (ref 32–36)
MCV RBC AUTO: 82.6 FL — SIGNIFICANT CHANGE UP (ref 80–100)
NRBC BLD AUTO-RTO: 0 /100 WBCS — SIGNIFICANT CHANGE UP (ref 0–0)
PLATELET # BLD AUTO: 154 K/UL — SIGNIFICANT CHANGE UP (ref 150–400)
POTASSIUM SERPL-MCNC: 4 MMOL/L — SIGNIFICANT CHANGE UP (ref 3.5–5.3)
POTASSIUM SERPL-SCNC: 4 MMOL/L — SIGNIFICANT CHANGE UP (ref 3.5–5.3)
PROTHROM AB SERPL-ACNC: 10.9 SEC — SIGNIFICANT CHANGE UP (ref 9.9–13.4)
RBC # BLD: 4.49 M/UL — SIGNIFICANT CHANGE UP (ref 3.8–5.2)
RBC # FLD: 13.8 % — SIGNIFICANT CHANGE UP (ref 10.3–14.5)
RH IG SCN BLD-IMP: POSITIVE — SIGNIFICANT CHANGE UP
SODIUM SERPL-SCNC: 133 MMOL/L — LOW (ref 135–145)
WBC # BLD: 3.97 K/UL — SIGNIFICANT CHANGE UP (ref 3.8–10.5)
WBC # FLD AUTO: 3.97 K/UL — SIGNIFICANT CHANGE UP (ref 3.8–10.5)

## 2025-05-24 PROCEDURE — 88304 TISSUE EXAM BY PATHOLOGIST: CPT | Mod: 26

## 2025-05-24 PROCEDURE — 47562 LAPAROSCOPIC CHOLECYSTECTOMY: CPT

## 2025-05-24 DEVICE — LIGATING CLIPS WECK HEMOLOK POLYMER MEDIUM-LARGE (GREEN) 6: Type: IMPLANTABLE DEVICE | Status: FUNCTIONAL

## 2025-05-24 DEVICE — STAPLER COVIDIEN TRI-STAPLE 45MM PURPLE RELOAD: Type: IMPLANTABLE DEVICE | Status: FUNCTIONAL

## 2025-05-24 DEVICE — LIGATING CLIPS WECK HEMOLOK POLYMER LARGE (PURPLE) 6: Type: IMPLANTABLE DEVICE | Status: FUNCTIONAL

## 2025-05-24 DEVICE — SURGICEL FIBRILLAR 2 X 4": Type: IMPLANTABLE DEVICE | Status: FUNCTIONAL

## 2025-05-24 DEVICE — LIGATING CLIPS WECK HEMOLOK POLYMER MEDIUM (BLUE) 6: Type: IMPLANTABLE DEVICE | Status: FUNCTIONAL

## 2025-05-24 DEVICE — CLIP APPLIER COVIDIEN ENDOCLIP III 5MM: Type: IMPLANTABLE DEVICE | Status: FUNCTIONAL

## 2025-05-24 DEVICE — CLIP APPLIER COVIDIEN ENDOCLIP 5MM: Type: IMPLANTABLE DEVICE | Status: FUNCTIONAL

## 2025-05-24 DEVICE — SURGIFLO HEMOSTATIC MATRIX KIT: Type: IMPLANTABLE DEVICE | Status: FUNCTIONAL

## 2025-05-24 DEVICE — CHOLANGIOGRAM CATH SYMMETRY REDDICK 4FR X 50CM WITH STRAIGHT INTRODUCER: Type: IMPLANTABLE DEVICE | Status: FUNCTIONAL

## 2025-05-24 DEVICE — LIGATING CLIPS WECK HEMOLOK POLYMER EXTRA LARGE (GOLD) 6: Type: IMPLANTABLE DEVICE | Status: FUNCTIONAL

## 2025-05-24 DEVICE — VISTASEAL FIBRIN HUMAN 4ML: Type: IMPLANTABLE DEVICE | Status: FUNCTIONAL

## 2025-05-24 DEVICE — SURGICEL 2 X 14": Type: IMPLANTABLE DEVICE | Status: FUNCTIONAL

## 2025-05-24 RX ORDER — KETOROLAC TROMETHAMINE 30 MG/ML
15 INJECTION, SOLUTION INTRAMUSCULAR; INTRAVENOUS EVERY 6 HOURS
Refills: 0 | Status: DISCONTINUED | OUTPATIENT
Start: 2025-05-24 | End: 2025-05-26

## 2025-05-24 RX ORDER — INSULIN LISPRO 100 U/ML
INJECTION, SOLUTION INTRAVENOUS; SUBCUTANEOUS AT BEDTIME
Refills: 0 | Status: DISCONTINUED | OUTPATIENT
Start: 2025-05-24 | End: 2025-05-28

## 2025-05-24 RX ORDER — INSULIN GLARGINE-YFGN 100 [IU]/ML
10 INJECTION, SOLUTION SUBCUTANEOUS AT BEDTIME
Refills: 0 | Status: DISCONTINUED | OUTPATIENT
Start: 2025-05-24 | End: 2025-05-25

## 2025-05-24 RX ORDER — METOPROLOL SUCCINATE 50 MG/1
12.5 TABLET, EXTENDED RELEASE ORAL DAILY
Refills: 0 | Status: DISCONTINUED | OUTPATIENT
Start: 2025-05-24 | End: 2025-05-28

## 2025-05-24 RX ORDER — LEVETIRACETAM 10 MG/ML
500 INJECTION, SOLUTION INTRAVENOUS
Refills: 0 | Status: DISCONTINUED | OUTPATIENT
Start: 2025-05-24 | End: 2025-05-28

## 2025-05-24 RX ORDER — ENOXAPARIN SODIUM 100 MG/ML
40 INJECTION SUBCUTANEOUS EVERY 24 HOURS
Refills: 0 | Status: DISCONTINUED | OUTPATIENT
Start: 2025-05-25 | End: 2025-05-28

## 2025-05-24 RX ORDER — ACETAMINOPHEN 500 MG/5ML
600 LIQUID (ML) ORAL ONCE
Refills: 0 | Status: DISCONTINUED | OUTPATIENT
Start: 2025-05-24 | End: 2025-05-24

## 2025-05-24 RX ORDER — DEXTROSE 50 % IN WATER 50 %
25 SYRINGE (ML) INTRAVENOUS ONCE
Refills: 0 | Status: DISCONTINUED | OUTPATIENT
Start: 2025-05-24 | End: 2025-05-28

## 2025-05-24 RX ORDER — HYDROMORPHONE/SOD CHLOR,ISO/PF 2 MG/10 ML
0.3 SYRINGE (ML) INJECTION
Refills: 0 | Status: DISCONTINUED | OUTPATIENT
Start: 2025-05-24 | End: 2025-05-24

## 2025-05-24 RX ORDER — EZETIMIBE 10 MG/1
10 TABLET ORAL DAILY
Refills: 0 | Status: DISCONTINUED | OUTPATIENT
Start: 2025-05-24 | End: 2025-05-28

## 2025-05-24 RX ORDER — ACETAMINOPHEN 500 MG/5ML
600 LIQUID (ML) ORAL ONCE
Refills: 0 | Status: COMPLETED | OUTPATIENT
Start: 2025-05-24 | End: 2025-05-24

## 2025-05-24 RX ORDER — INSULIN LISPRO 100 U/ML
7 INJECTION, SOLUTION INTRAVENOUS; SUBCUTANEOUS
Refills: 0 | Status: DISCONTINUED | OUTPATIENT
Start: 2025-05-24 | End: 2025-05-24

## 2025-05-24 RX ORDER — ACETAMINOPHEN 500 MG/5ML
650 LIQUID (ML) ORAL EVERY 6 HOURS
Refills: 0 | Status: DISCONTINUED | OUTPATIENT
Start: 2025-05-24 | End: 2025-05-28

## 2025-05-24 RX ORDER — OXYCODONE HYDROCHLORIDE 30 MG/1
5 TABLET ORAL ONCE
Refills: 0 | Status: DISCONTINUED | OUTPATIENT
Start: 2025-05-24 | End: 2025-05-24

## 2025-05-24 RX ORDER — DEXTROSE 50 % IN WATER 50 %
15 SYRINGE (ML) INTRAVENOUS ONCE
Refills: 0 | Status: DISCONTINUED | OUTPATIENT
Start: 2025-05-24 | End: 2025-05-28

## 2025-05-24 RX ORDER — ONDANSETRON HCL/PF 4 MG/2 ML
4 VIAL (ML) INJECTION ONCE
Refills: 0 | Status: DISCONTINUED | OUTPATIENT
Start: 2025-05-24 | End: 2025-05-24

## 2025-05-24 RX ORDER — GABAPENTIN 400 MG/1
100 CAPSULE ORAL DAILY
Refills: 0 | Status: DISCONTINUED | OUTPATIENT
Start: 2025-05-24 | End: 2025-05-28

## 2025-05-24 RX ORDER — KETOTIFEN FUMARATE 0.35 MG/ML
1 SOLUTION/ DROPS OPHTHALMIC
Refills: 0 | Status: DISCONTINUED | OUTPATIENT
Start: 2025-05-24 | End: 2025-05-28

## 2025-05-24 RX ORDER — DEXTROSE 50 % IN WATER 50 %
12.5 SYRINGE (ML) INTRAVENOUS ONCE
Refills: 0 | Status: DISCONTINUED | OUTPATIENT
Start: 2025-05-24 | End: 2025-05-28

## 2025-05-24 RX ORDER — INSULIN LISPRO 100 U/ML
INJECTION, SOLUTION INTRAVENOUS; SUBCUTANEOUS
Refills: 0 | Status: DISCONTINUED | OUTPATIENT
Start: 2025-05-24 | End: 2025-05-28

## 2025-05-24 RX ORDER — ASPIRIN 325 MG
81 TABLET ORAL DAILY
Refills: 0 | Status: DISCONTINUED | OUTPATIENT
Start: 2025-05-24 | End: 2025-05-28

## 2025-05-24 RX ORDER — SODIUM CHLORIDE 9 G/1000ML
1000 INJECTION, SOLUTION INTRAVENOUS
Refills: 0 | Status: DISCONTINUED | OUTPATIENT
Start: 2025-05-24 | End: 2025-05-28

## 2025-05-24 RX ORDER — INSULIN LISPRO 100 U/ML
7 INJECTION, SOLUTION INTRAVENOUS; SUBCUTANEOUS
Refills: 0 | Status: DISCONTINUED | OUTPATIENT
Start: 2025-05-24 | End: 2025-05-25

## 2025-05-24 RX ORDER — GLUCAGON 3 MG/1
1 POWDER NASAL ONCE
Refills: 0 | Status: DISCONTINUED | OUTPATIENT
Start: 2025-05-24 | End: 2025-05-28

## 2025-05-24 RX ORDER — ROSUVASTATIN CALCIUM 20 MG/1
20 TABLET, FILM COATED ORAL AT BEDTIME
Refills: 0 | Status: DISCONTINUED | OUTPATIENT
Start: 2025-05-24 | End: 2025-05-28

## 2025-05-24 RX ADMIN — KETOROLAC TROMETHAMINE 15 MILLIGRAM(S): 30 INJECTION, SOLUTION INTRAMUSCULAR; INTRAVENOUS at 17:58

## 2025-05-24 RX ADMIN — INSULIN LISPRO 2: 100 INJECTION, SOLUTION INTRAVENOUS; SUBCUTANEOUS at 08:34

## 2025-05-24 RX ADMIN — Medication 240 MILLIGRAM(S): at 04:32

## 2025-05-24 RX ADMIN — INSULIN LISPRO 2: 100 INJECTION, SOLUTION INTRAVENOUS; SUBCUTANEOUS at 21:13

## 2025-05-24 RX ADMIN — INSULIN LISPRO 2: 100 INJECTION, SOLUTION INTRAVENOUS; SUBCUTANEOUS at 11:28

## 2025-05-24 RX ADMIN — KETOROLAC TROMETHAMINE 15 MILLIGRAM(S): 30 INJECTION, SOLUTION INTRAMUSCULAR; INTRAVENOUS at 17:43

## 2025-05-24 RX ADMIN — GABAPENTIN 100 MILLIGRAM(S): 400 CAPSULE ORAL at 17:43

## 2025-05-24 RX ADMIN — Medication 40 MILLIGRAM(S): at 06:36

## 2025-05-24 RX ADMIN — LEVETIRACETAM 500 MILLIGRAM(S): 10 INJECTION, SOLUTION INTRAVENOUS at 06:36

## 2025-05-24 RX ADMIN — KETOTIFEN FUMARATE 1 DROP(S): 0.35 SOLUTION/ DROPS OPHTHALMIC at 17:43

## 2025-05-24 RX ADMIN — ROSUVASTATIN CALCIUM 20 MILLIGRAM(S): 20 TABLET, FILM COATED ORAL at 21:13

## 2025-05-24 RX ADMIN — INSULIN GLARGINE-YFGN 10 UNIT(S): 100 INJECTION, SOLUTION SUBCUTANEOUS at 21:11

## 2025-05-24 RX ADMIN — Medication 650 MILLIGRAM(S): at 21:30

## 2025-05-24 RX ADMIN — METOPROLOL SUCCINATE 12.5 MILLIGRAM(S): 50 TABLET, EXTENDED RELEASE ORAL at 06:35

## 2025-05-24 RX ADMIN — Medication 600 MILLIGRAM(S): at 05:00

## 2025-05-24 RX ADMIN — Medication 650 MILLIGRAM(S): at 21:00

## 2025-05-24 RX ADMIN — KETOTIFEN FUMARATE 1 DROP(S): 0.35 SOLUTION/ DROPS OPHTHALMIC at 06:36

## 2025-05-24 RX ADMIN — INSULIN LISPRO 3: 100 INJECTION, SOLUTION INTRAVENOUS; SUBCUTANEOUS at 16:09

## 2025-05-24 RX ADMIN — EZETIMIBE 10 MILLIGRAM(S): 10 TABLET ORAL at 17:43

## 2025-05-24 RX ADMIN — Medication 81 MILLIGRAM(S): at 17:43

## 2025-05-24 RX ADMIN — LEVETIRACETAM 500 MILLIGRAM(S): 10 INJECTION, SOLUTION INTRAVENOUS at 17:43

## 2025-05-24 NOTE — PROGRESS NOTE ADULT - SUBJECTIVE AND OBJECTIVE BOX
DATE OF SERVICE: 05-24-25 @ 09:33    Patient is a 70y old  Female who presents with a chief complaint of abd pain (23 May 2025 10:20)      SUBJECTIVE / OVERNIGHT EVENTS:  No chest pain. No shortness of breath. No complaints. No events overnight.     MEDICATIONS  (STANDING):  aspirin enteric coated 81 milliGRAM(s) Oral daily  ciprofloxacin   IVPB 400 milliGRAM(s) IV Intermittent once  dextrose 5%. 1000 milliLiter(s) (50 mL/Hr) IV Continuous <Continuous>  dextrose 5%. 1000 milliLiter(s) (100 mL/Hr) IV Continuous <Continuous>  dextrose 50% Injectable 25 Gram(s) IV Push once  dextrose 50% Injectable 12.5 Gram(s) IV Push once  dextrose 50% Injectable 25 Gram(s) IV Push once  ezetimibe 10 milliGRAM(s) Oral daily  gabapentin 100 milliGRAM(s) Oral daily  glucagon  Injectable 1 milliGRAM(s) IntraMuscular once  heparin   Injectable 5000 Unit(s) SubCutaneous every 8 hours  indomethacin Suppository 100 milliGRAM(s) Rectal once  insulin glargine Injectable (LANTUS) 10 Unit(s) SubCutaneous at bedtime  insulin lispro (ADMELOG) corrective regimen sliding scale   SubCutaneous three times a day before meals  insulin lispro (ADMELOG) corrective regimen sliding scale   SubCutaneous at bedtime  insulin lispro Injectable (ADMELOG) 7 Unit(s) SubCutaneous three times a day before meals  ketotifen 0.025% Ophthalmic Solution 1 Drop(s) Both EYES two times a day  levETIRAcetam 500 milliGRAM(s) Oral two times a day  metoprolol succinate ER 12.5 milliGRAM(s) Oral daily  pantoprazole    Tablet 40 milliGRAM(s) Oral before breakfast  rosuvastatin 20 milliGRAM(s) Oral at bedtime  saline laxative (FLEET) Rectal Enema 1 Enema Rectal once    MEDICATIONS  (PRN):  dextrose Oral Gel 15 Gram(s) Oral once PRN Blood Glucose LESS THAN 70 milliGRAM(s)/deciliter  dextrose Oral Gel 15 Gram(s) Oral once PRN Blood Glucose LESS THAN 70 milliGRAM(s)/deciliter      Vital Signs Last 24 Hrs  T(C): 36.4 (24 May 2025 04:35), Max: 36.7 (23 May 2025 19:57)  T(F): 97.5 (24 May 2025 04:35), Max: 98.1 (23 May 2025 19:57)  HR: 64 (24 May 2025 04:35) (64 - 82)  BP: 164/71 (24 May 2025 04:35) (106/67 - 164/71)  BP(mean): --  RR: 18 (24 May 2025 04:35) (18 - 18)  SpO2: 97% (24 May 2025 04:35) (97% - 98%)    Parameters below as of 24 May 2025 04:35  Patient On (Oxygen Delivery Method): room air      CAPILLARY BLOOD GLUCOSE      POCT Blood Glucose.: 240 mg/dL (24 May 2025 08:02)  POCT Blood Glucose.: 196 mg/dL (23 May 2025 21:43)  POCT Blood Glucose.: 275 mg/dL (23 May 2025 17:04)  POCT Blood Glucose.: 319 mg/dL (23 May 2025 12:46)    I&O's Summary    23 May 2025 07:01  -  24 May 2025 07:00  --------------------------------------------------------  IN: 240 mL / OUT: 0 mL / NET: 240 mL        PHYSICAL EXAM:  GENERAL: NAD, well-developed  HEAD:  Atraumatic, Normocephalic  EYES: EOMI, PERRLA, conjunctiva and sclera clear  NECK: Supple, No JVD  CHEST/LUNG: Clear to auscultation bilaterally; No wheeze  HEART: Regular rate and rhythm; No murmurs, rubs, or gallops  ABDOMEN: Soft, Nontender, Nondistended; Bowel sounds present  EXTREMITIES:  2+ Peripheral Pulses, No clubbing, cyanosis, or edema  PSYCH: AAOx3  NEUROLOGY: non-focal  SKIN: No rashes or lesions    LABS:                        12.9   3.97  )-----------( 154      ( 24 May 2025 07:14 )             37.1     05-24    133[L]  |  97  |  9   ----------------------------<  246[H]  4.0   |  25  |  0.52    Ca    9.0      24 May 2025 07:14      PT/INR - ( 24 May 2025 07:14 )   PT: 10.9 sec;   INR: 0.96 ratio         PTT - ( 24 May 2025 07:14 )  PTT:28.8 sec      Urinalysis Basic - ( 24 May 2025 07:14 )    Color: x / Appearance: x / SG: x / pH: x  Gluc: 246 mg/dL / Ketone: x  / Bili: x / Urobili: x   Blood: x / Protein: x / Nitrite: x   Leuk Esterase: x / RBC: x / WBC x   Sq Epi: x / Non Sq Epi: x / Bacteria: x        RADIOLOGY & ADDITIONAL TESTS:    Imaging Personally Reviewed:    Consultant(s) Notes Reviewed:      Care Discussed with Consultants/Other Providers:

## 2025-05-24 NOTE — PRE-ANESTHESIA EVALUATION ADULT - NSANTHPEFT_GEN_ALL_CORE
Cardiac: RRR, no m/g/r  Pulm: CTAB
Gen: alert and oriented x3  Lung: clear   CV: S1 S2   Abd: soft  Ext: No edema  neuro: CN 2-12 grossly intact, no gross motor or sensory deficits appreciated  Skin: normal

## 2025-05-24 NOTE — PROGRESS NOTE ADULT - SUBJECTIVE AND OBJECTIVE BOX
[facility]      Martir Suggs NPKeenanC    [address] [place] [address]  [phone]      INTERVAL HPI/OVERNIGHT EVENTS:  Pt s/e  reports some abdominal pain but improved from yesterday  no other complaints  plan for or today        MEDICATIONS  (STANDING):  aspirin enteric coated 81 milliGRAM(s) Oral daily  ciprofloxacin   IVPB 400 milliGRAM(s) IV Intermittent once  dextrose 5%. 1000 milliLiter(s) (50 mL/Hr) IV Continuous <Continuous>  dextrose 5%. 1000 milliLiter(s) (100 mL/Hr) IV Continuous <Continuous>  dextrose 50% Injectable 25 Gram(s) IV Push once  dextrose 50% Injectable 12.5 Gram(s) IV Push once  dextrose 50% Injectable 25 Gram(s) IV Push once  ezetimibe 10 milliGRAM(s) Oral daily  gabapentin 100 milliGRAM(s) Oral daily  glucagon  Injectable 1 milliGRAM(s) IntraMuscular once  heparin   Injectable 5000 Unit(s) SubCutaneous every 8 hours  indomethacin Suppository 100 milliGRAM(s) Rectal once  insulin glargine Injectable (LANTUS) 10 Unit(s) SubCutaneous at bedtime  insulin lispro (ADMELOG) corrective regimen sliding scale   SubCutaneous three times a day before meals  insulin lispro (ADMELOG) corrective regimen sliding scale   SubCutaneous at bedtime  insulin lispro Injectable (ADMELOG) 7 Unit(s) SubCutaneous three times a day before meals  ketotifen 0.025% Ophthalmic Solution 1 Drop(s) Both EYES two times a day  levETIRAcetam 500 milliGRAM(s) Oral two times a day  metoprolol succinate ER 12.5 milliGRAM(s) Oral daily  pantoprazole    Tablet 40 milliGRAM(s) Oral before breakfast  rosuvastatin 20 milliGRAM(s) Oral at bedtime  saline laxative (FLEET) Rectal Enema 1 Enema Rectal once    MEDICATIONS  (PRN):  dextrose Oral Gel 15 Gram(s) Oral once PRN Blood Glucose LESS THAN 70 milliGRAM(s)/deciliter  dextrose Oral Gel 15 Gram(s) Oral once PRN Blood Glucose LESS THAN 70 milliGRAM(s)/deciliter      Allergies    penicillin (Headache)    Intolerances          PHYSICAL EXAM  Vital Signs Last 24 Hrs  T(C): 36.4 (24 May 2025 04:35), Max: 36.7 (23 May 2025 19:57)  T(F): 97.5 (24 May 2025 04:35), Max: 98.1 (23 May 2025 19:57)  HR: 64 (24 May 2025 04:35) (64 - 82)  BP: 164/71 (24 May 2025 04:35) (106/67 - 164/71)  BP(mean): --  RR: 18 (24 May 2025 04:35) (18 - 18)  SpO2: 97% (24 May 2025 04:35) (97% - 98%)    Parameters below as of 24 May 2025 04:35  Patient On (Oxygen Delivery Method): room air          GENERAL:  Appears stated age  HEENT:  NC/AT  CHEST:  Full & symmetric excursion  HEART:  Regular rhythm  ABDOMEN:  Soft, non-tender, non-distended  EXTEREMITIES:  no cyanosis  SKIN:  No rash  NEURO:  Alert            LABS:                        12.9   3.97  )-----------( 154      ( 24 May 2025 07:14 )             37.1     05-24    133[L]  |  97  |  9   ----------------------------<  246[H]  4.0   |  25  |  0.52    Ca    9.0      24 May 2025 07:14      PT/INR - ( 24 May 2025 07:14 )   PT: 10.9 sec;   INR: 0.96 ratio         PTT - ( 24 May 2025 07:14 )  PTT:28.8 sec      05-23-25 @ 07:01  -  05-24-25 @ 07:00  --------------------------------------------------------  IN: 240 mL / OUT: 0 mL / NET: 240 mL                      RADIOLOGY & ADDITIONAL TESTS:

## 2025-05-24 NOTE — PROGRESS NOTE ADULT - ASSESSMENT
tenatAssessment:  #Choledocholithiasis  #CBD dilation  #Constipation    CT 5/19: Hyperdensity in the distal common bile duct measuring 0.8 cm, suspicious for choledocholithiasis. Biliary duct dilation. Rectum distended with stool  MRCP 5/20:  Cholelithiasis and choledocholithiasis. Biliary ductal dilatation with 8 mm CBD.  ERCP 5/21: Choledocholithiasis was found. Complete removal was accomplished by biliary sphincterotomy and balloon extraction.    Plan:  - Imaging reviewed and discussed with patient  - +Bm after lactulose  - Monitor GI function   - Daily bowel regimen. Miralax daily. Senna qhs  - Given recurrent episodes of choledocholithiasis discussed with patient recommendation for ccy to reduce number of ERCPs. Surgery consulted, tentatively planned for ccy tomorrow.   - post ERCP abd pain today. Will order amylase/lipase to r/o pancreatitis. Await results    will follow  I reviewed the overnight course of events on the unit, re-confirming the patient history. I discussed the care with the patient   The plan of care was discussed by the nurse practitioner and modifications were made to the notation where appropriate.   Differential diagnosis and plan of care discussed with patient after the evaluation  35 minutes spent on total encounter of which more than fifty percent of the encounter was spent counseling and/or coordinating care with the attending physician.  Advanced care planning was discussed with patient and family.  Advanced care planning forms were reviewed and discussed.  Risks, benefits and alternatives of gastroenterologic procedures/interventions were discussed in detail and all questions were answered.

## 2025-05-24 NOTE — PRE PROCEDURE NOTE - DAY OF PROCEDURE ATTESTATION
I have personally seen, examined, and participated in the care of this patient. <-- Click to add NO significant Past Surgical History

## 2025-05-24 NOTE — PRE-ANESTHESIA EVALUATION ADULT - NSANTHOSAYNRD_GEN_A_CORE
No. LAURIE screening performed.  STOP BANG Legend: 0-2 = LOW Risk; 3-4 = INTERMEDIATE Risk; 5-8 = HIGH Risk
No. LAURIE screening performed.  STOP BANG Legend: 0-2 = LOW Risk; 3-4 = INTERMEDIATE Risk; 5-8 = HIGH Risk

## 2025-05-24 NOTE — PRE-ANESTHESIA EVALUATION ADULT - NSANTHAIRWAYFT_ENT_ALL_CORE
Neck FROM, TMD > 3 FB
Mouth opening: LESS than 2cm  Upper lip bite: adequate  Cervical ROM: grossly intact

## 2025-05-24 NOTE — PROGRESS NOTE ADULT - SUBJECTIVE AND OBJECTIVE BOX
[facility]   MD SANDRA MARRERO MD RUORU WONG, PA    TEL:  FROM 9 AM to 5 PM ---OFFICE: [phone]  AVAILABLE ON TEAMS    FROM 5 PM - 9 AM PLEASE CALL ANSWERING SERVICE: [phone]    RENAL FOLLOW UP NOTE--Date of Service 05-24-25 @ 09:47  --------------------------------------------------------------------------------  HPI:      Pt seen and examined at bedside.   Zhenies SOB, chest pain     PAST HISTORY  --------------------------------------------------------------------------------  No significant changes to PMH, PSH, FHx, SHx, unless otherwise noted    ALLERGIES & MEDICATIONS  --------------------------------------------------------------------------------  Allergies    penicillin (Headache)    Intolerances      Standing Inpatient Medications  aspirin enteric coated 81 milliGRAM(s) Oral daily  ciprofloxacin   IVPB 400 milliGRAM(s) IV Intermittent once  dextrose 5%. 1000 milliLiter(s) IV Continuous <Continuous>  dextrose 5%. 1000 milliLiter(s) IV Continuous <Continuous>  dextrose 50% Injectable 25 Gram(s) IV Push once  dextrose 50% Injectable 12.5 Gram(s) IV Push once  dextrose 50% Injectable 25 Gram(s) IV Push once  ezetimibe 10 milliGRAM(s) Oral daily  gabapentin 100 milliGRAM(s) Oral daily  glucagon  Injectable 1 milliGRAM(s) IntraMuscular once  heparin   Injectable 5000 Unit(s) SubCutaneous every 8 hours  indomethacin Suppository 100 milliGRAM(s) Rectal once  insulin glargine Injectable (LANTUS) 10 Unit(s) SubCutaneous at bedtime  insulin lispro (ADMELOG) corrective regimen sliding scale   SubCutaneous three times a day before meals  insulin lispro (ADMELOG) corrective regimen sliding scale   SubCutaneous at bedtime  insulin lispro Injectable (ADMELOG) 7 Unit(s) SubCutaneous three times a day before meals  ketotifen 0.025% Ophthalmic Solution 1 Drop(s) Both EYES two times a day  levETIRAcetam 500 milliGRAM(s) Oral two times a day  metoprolol succinate ER 12.5 milliGRAM(s) Oral daily  pantoprazole    Tablet 40 milliGRAM(s) Oral before breakfast  rosuvastatin 20 milliGRAM(s) Oral at bedtime  saline laxative (FLEET) Rectal Enema 1 Enema Rectal once    PRN Inpatient Medications  dextrose Oral Gel 15 Gram(s) Oral once PRN  dextrose Oral Gel 15 Gram(s) Oral once PRN      REVIEW OF SYSTEMS  --------------------------------------------------------------------------------  General: no fever  CVS: no chest pain    MSK: no edema     VITALS/PHYSICAL EXAM  --------------------------------------------------------------------------------  T(C): 36.4 (05-24-25 @ 04:35), Max: 36.7 (05-23-25 @ 19:57)  HR: 64 (05-24-25 @ 04:35) (64 - 82)  BP: 164/71 (05-24-25 @ 04:35) (106/67 - 164/71)  RR: 18 (05-24-25 @ 04:35) (18 - 18)  SpO2: 97% (05-24-25 @ 04:35) (97% - 98%)  Wt(kg): --        05-23-25 @ 07:01  -  05-24-25 @ 07:00  --------------------------------------------------------  IN: 240 mL / OUT: 0 mL / NET: 240 mL      Physical Exam:  	Gen: NAD  	HEENT: MMM  	Pulm: CTA B/L  	CV: S1S2  	Abd: Soft, +BS  	Ext: No LE edema B/L                      Neuro: Awake   	Skin: Warm and Dry   	Vascular access: NO HD catheter            no  cassy  LABS/STUDIES  --------------------------------------------------------------------------------              12.9   3.97  >-----------<  154      [05-24-25 @ 07:14]              37.1     133  |  97  |  9   ----------------------------<  246      [05-24-25 @ 07:14]  4.0   |  25  |  0.52        Ca     9.0     [05-24-25 @ 07:14]      PT/INR: PT 10.9 , INR 0.96       [05-24-25 @ 07:14]  PTT: 28.8       [05-24-25 @ 07:14]      Creatinine Trend:  SCr 0.52 [05-24 @ 07:14]  SCr 0.48 [05-23 @ 06:30]  SCr 0.63 [05-22 @ 07:13]  SCr 0.57 [05-21 @ 07:19]  SCr 0.60 [05-20 @ 05:42]    Urinalysis - [05-24-25 @ 07:14]      Color  / Appearance  / SG  / pH       Gluc 246 / Ketone   / Bili  / Urobili        Blood  / Protein  / Leuk Est  / Nitrite       RBC  / WBC  / Hyaline  / Gran  / Sq Epi  / Non Sq Epi  / Bacteria     Urine Creatinine 27      [05-21-25 @ 21:15]  Urine Sodium 111      [05-21-25 @ 21:15]  Urine Osmolality 555      [05-21-25 @ 21:15]    TSH 1.66      [05-21-25 @ 07:21]

## 2025-05-24 NOTE — BRIEF OPERATIVE NOTE - OPERATION/FINDINGS
Chronically inflamed fibrotic gallbladder. Critical view achieved. Artery taken with metal clip applier. Cystic duct dilated and taken with 45mm purple endoGIA.

## 2025-05-24 NOTE — PROGRESS NOTE ADULT - ASSESSMENT
71 y/o female with PMH including HTN, HLD, DM, epilepsy. Patient presents to [facility] w/ one week of epigastric abdominal pain (worsened after eating) LUQ/LLQ & L flank pain, endorsing elevated BG and new pruritic full body rash    Assessment  DMT2: 70y Female with DM T2 with hyperglycemia, A1C 8.7% , was on insulin at home, now on basal bolus insulin with coverage, blood sugars running high, labile. sp lap west.   Abd pain: on medications, GI eval, monitored.  HTN: on antihypertensive medications, monitored, asymptomatic.      Discussed plan and management wit Dr Titi Walls MD  Cell: [phone]  Office: [phone]

## 2025-05-24 NOTE — PROGRESS NOTE ADULT - SUBJECTIVE AND OBJECTIVE BOX
Chief complaint  Patient is a 70y old  Female who presents with a chief complaint of abd pain (23 May 2025 10:20)         Labs and Fingersticks  CAPILLARY BLOOD GLUCOSE      POCT Blood Glucose.: 290 mg/dL (24 May 2025 15:43)  POCT Blood Glucose.: 213 mg/dL (24 May 2025 11:25)  POCT Blood Glucose.: 240 mg/dL (24 May 2025 08:02)  POCT Blood Glucose.: 196 mg/dL (23 May 2025 21:43)  POCT Blood Glucose.: 275 mg/dL (23 May 2025 17:04)      Anion Gap: 11 (05-24 @ 07:14)  Anion Gap: 11 (05-23 @ 06:30)      Calcium: 9.0 (05-24 @ 07:14)  Calcium: 8.4 (05-23 @ 06:30)          05-24    133[L]  |  97  |  9   ----------------------------<  246[H]  4.0   |  25  |  0.52    Ca    9.0      24 May 2025 07:14                          12.9   3.97  )-----------( 154      ( 24 May 2025 07:14 )             37.1     Medications  MEDICATIONS  (STANDING):  acetaminophen     Tablet .. 650 milliGRAM(s) Oral every 6 hours  acetaminophen   IVPB .. 600 milliGRAM(s) IV Intermittent once  aspirin enteric coated 81 milliGRAM(s) Oral daily  dextrose 5%. 1000 milliLiter(s) (50 mL/Hr) IV Continuous <Continuous>  dextrose 5%. 1000 milliLiter(s) (100 mL/Hr) IV Continuous <Continuous>  dextrose 50% Injectable 25 Gram(s) IV Push once  dextrose 50% Injectable 12.5 Gram(s) IV Push once  dextrose 50% Injectable 25 Gram(s) IV Push once  ezetimibe 10 milliGRAM(s) Oral daily  gabapentin 100 milliGRAM(s) Oral daily  glucagon  Injectable 1 milliGRAM(s) IntraMuscular once  insulin glargine Injectable (LANTUS) 10 Unit(s) SubCutaneous at bedtime  insulin lispro (ADMELOG) corrective regimen sliding scale   SubCutaneous three times a day before meals  insulin lispro (ADMELOG) corrective regimen sliding scale   SubCutaneous at bedtime  insulin lispro Injectable (ADMELOG) 7 Unit(s) SubCutaneous three times a day before meals  ketorolac   Injectable 15 milliGRAM(s) IV Push every 6 hours  ketotifen 0.025% Ophthalmic Solution 1 Drop(s) Both EYES two times a day  levETIRAcetam 500 milliGRAM(s) Oral two times a day  metoprolol succinate ER 12.5 milliGRAM(s) Oral daily  rosuvastatin 20 milliGRAM(s) Oral at bedtime      Physical Exam  General: Patient comfortable in bed   Vital Signs Last 12 Hrs  T(F): 97.7 (05-24-25 @ 15:30), Max: 97.7 (05-24-25 @ 11:50)  HR: 78 (05-24-25 @ 16:15) (64 - 89)  BP: 133/62 (05-24-25 @ 16:15) (118/58 - 164/71)  BP(mean): 89 (05-24-25 @ 16:15) (83 - 91)  RR: 17 (05-24-25 @ 16:15) (16 - 18)  SpO2: 100% (05-24-25 @ 16:15) (97% - 100%)    CVS: S1S2   Respiratory: No wheezing, no crepitations  GI: Abdomen soft, bowel sounds positive  Musculoskeletal:  moves all extremities  : Voiding

## 2025-05-24 NOTE — PROGRESS NOTE ADULT - ASSESSMENT
69 yo F pmhx of IDDM, HTN, HLD, Epilepsy on keppra/vimpat presents for one week of epigastric abdominal pain (worse after eating) and LUQ/LLQ and L flank pain. Patient states that she has been having elevated blood sugars and endorses new pruritic full body rash that began one week ago. Denies fever, chills, chest pain, shortness of breath, new allergens, nausea, vomiting, hematuria, dysuria, hematochezia, constipation, or any other symptoms at this time    choledocholithiases/cholecystitis  - MRCP done  - ERCP done  - needs lap west  - scheduled for today  - pt has no medical contraindications for the planned procedure    hyponatremia improved  - nephrology consult following  - fluid restrict to 1.5 L per day    seizure d/o  - c/w keppra    neuropathy  - c/w gabapentin    diabetes  - fs qid  - hgb a1c 8.7  - insulin ss  - lantus 10 units qhs started  - endo consult    constipation  - senna   - mirallax  - lactulose    HLD  - c/w statin  - c/w zetia    dvt px  - sq heparin

## 2025-05-24 NOTE — PROGRESS NOTE ADULT - ASSESSMENT
71 yo F pmhx of IDDM, HTN, HLD, Epilepsy on keppra/vimpat presents for one week of epigastric abdominal pain (worse after eating) and LUQ/LLQ and L flank pain. Nephro called for hyponatremia    Hyponatremia and Pseudohyponatremia  Etiology multifactorial could be hyperglycemia, pain (Siadh)  Corrected for glucose sodium is 132-134 stable  Advised fluid restrict to 1.5 L per day  Needs better control of hyperglycemia  Urine studies pending  Monitor urine OP    Acidosis  Likely in the setting of Hyperglycemia worsening  Monitor  Correct hyperglycemia    Abdominal pain  Per primary  S/p ERCP and removal of bile stones  Planned for lap west on 05/24

## 2025-05-25 LAB
ANION GAP SERPL CALC-SCNC: 13 MMOL/L — SIGNIFICANT CHANGE UP (ref 5–17)
BUN SERPL-MCNC: 9 MG/DL — SIGNIFICANT CHANGE UP (ref 7–23)
CALCIUM SERPL-MCNC: 8.9 MG/DL — SIGNIFICANT CHANGE UP (ref 8.4–10.5)
CHLORIDE SERPL-SCNC: 96 MMOL/L — SIGNIFICANT CHANGE UP (ref 96–108)
CO2 SERPL-SCNC: 23 MMOL/L — SIGNIFICANT CHANGE UP (ref 22–31)
CREAT SERPL-MCNC: 0.6 MG/DL — SIGNIFICANT CHANGE UP (ref 0.5–1.3)
EGFR: 96 ML/MIN/1.73M2 — SIGNIFICANT CHANGE UP
EGFR: 96 ML/MIN/1.73M2 — SIGNIFICANT CHANGE UP
GLUCOSE BLDC GLUCOMTR-MCNC: 101 MG/DL — HIGH (ref 70–99)
GLUCOSE BLDC GLUCOMTR-MCNC: 121 MG/DL — HIGH (ref 70–99)
GLUCOSE BLDC GLUCOMTR-MCNC: 240 MG/DL — HIGH (ref 70–99)
GLUCOSE BLDC GLUCOMTR-MCNC: 323 MG/DL — HIGH (ref 70–99)
GLUCOSE BLDC GLUCOMTR-MCNC: 47 MG/DL — CRITICAL LOW (ref 70–99)
GLUCOSE SERPL-MCNC: 224 MG/DL — HIGH (ref 70–99)
HCT VFR BLD CALC: 35 % — SIGNIFICANT CHANGE UP (ref 34.5–45)
HGB BLD-MCNC: 11.9 G/DL — SIGNIFICANT CHANGE UP (ref 11.5–15.5)
MAGNESIUM SERPL-MCNC: 2 MG/DL — SIGNIFICANT CHANGE UP (ref 1.6–2.6)
MCHC RBC-ENTMCNC: 28.5 PG — SIGNIFICANT CHANGE UP (ref 27–34)
MCHC RBC-ENTMCNC: 34 G/DL — SIGNIFICANT CHANGE UP (ref 32–36)
MCV RBC AUTO: 83.9 FL — SIGNIFICANT CHANGE UP (ref 80–100)
NRBC BLD AUTO-RTO: 0 /100 WBCS — SIGNIFICANT CHANGE UP (ref 0–0)
PHOSPHATE SERPL-MCNC: 2.7 MG/DL — SIGNIFICANT CHANGE UP (ref 2.5–4.5)
PLATELET # BLD AUTO: 140 K/UL — LOW (ref 150–400)
POTASSIUM SERPL-MCNC: 4.2 MMOL/L — SIGNIFICANT CHANGE UP (ref 3.5–5.3)
POTASSIUM SERPL-SCNC: 4.2 MMOL/L — SIGNIFICANT CHANGE UP (ref 3.5–5.3)
RBC # BLD: 4.17 M/UL — SIGNIFICANT CHANGE UP (ref 3.8–5.2)
RBC # FLD: 13.7 % — SIGNIFICANT CHANGE UP (ref 10.3–14.5)
SODIUM SERPL-SCNC: 132 MMOL/L — LOW (ref 135–145)
WBC # BLD: 5.54 K/UL — SIGNIFICANT CHANGE UP (ref 3.8–10.5)
WBC # FLD AUTO: 5.54 K/UL — SIGNIFICANT CHANGE UP (ref 3.8–10.5)

## 2025-05-25 RX ORDER — INSULIN LISPRO 100 U/ML
4 INJECTION, SOLUTION INTRAVENOUS; SUBCUTANEOUS
Refills: 0 | Status: DISCONTINUED | OUTPATIENT
Start: 2025-05-26 | End: 2025-05-26

## 2025-05-25 RX ORDER — INSULIN GLARGINE-YFGN 100 [IU]/ML
15 INJECTION, SOLUTION SUBCUTANEOUS AT BEDTIME
Refills: 0 | Status: DISCONTINUED | OUTPATIENT
Start: 2025-05-25 | End: 2025-05-25

## 2025-05-25 RX ORDER — INSULIN GLARGINE-YFGN 100 [IU]/ML
7 INJECTION, SOLUTION SUBCUTANEOUS AT BEDTIME
Refills: 0 | Status: DISCONTINUED | OUTPATIENT
Start: 2025-05-25 | End: 2025-05-27

## 2025-05-25 RX ADMIN — Medication 650 MILLIGRAM(S): at 20:19

## 2025-05-25 RX ADMIN — ROSUVASTATIN CALCIUM 20 MILLIGRAM(S): 20 TABLET, FILM COATED ORAL at 21:19

## 2025-05-25 RX ADMIN — INSULIN GLARGINE-YFGN 7 UNIT(S): 100 INJECTION, SOLUTION SUBCUTANEOUS at 22:20

## 2025-05-25 RX ADMIN — Medication 650 MILLIGRAM(S): at 11:04

## 2025-05-25 RX ADMIN — EZETIMIBE 10 MILLIGRAM(S): 10 TABLET ORAL at 13:21

## 2025-05-25 RX ADMIN — METOPROLOL SUCCINATE 12.5 MILLIGRAM(S): 50 TABLET, EXTENDED RELEASE ORAL at 05:47

## 2025-05-25 RX ADMIN — ENOXAPARIN SODIUM 40 MILLIGRAM(S): 100 INJECTION SUBCUTANEOUS at 11:08

## 2025-05-25 RX ADMIN — KETOROLAC TROMETHAMINE 15 MILLIGRAM(S): 30 INJECTION, SOLUTION INTRAMUSCULAR; INTRAVENOUS at 05:46

## 2025-05-25 RX ADMIN — LEVETIRACETAM 500 MILLIGRAM(S): 10 INJECTION, SOLUTION INTRAVENOUS at 05:46

## 2025-05-25 RX ADMIN — KETOROLAC TROMETHAMINE 15 MILLIGRAM(S): 30 INJECTION, SOLUTION INTRAMUSCULAR; INTRAVENOUS at 17:58

## 2025-05-25 RX ADMIN — KETOTIFEN FUMARATE 1 DROP(S): 0.35 SOLUTION/ DROPS OPHTHALMIC at 05:47

## 2025-05-25 RX ADMIN — KETOROLAC TROMETHAMINE 15 MILLIGRAM(S): 30 INJECTION, SOLUTION INTRAMUSCULAR; INTRAVENOUS at 06:16

## 2025-05-25 RX ADMIN — Medication 650 MILLIGRAM(S): at 20:49

## 2025-05-25 RX ADMIN — INSULIN LISPRO 4: 100 INJECTION, SOLUTION INTRAVENOUS; SUBCUTANEOUS at 11:02

## 2025-05-25 RX ADMIN — Medication 650 MILLIGRAM(S): at 04:26

## 2025-05-25 RX ADMIN — GABAPENTIN 100 MILLIGRAM(S): 400 CAPSULE ORAL at 13:22

## 2025-05-25 RX ADMIN — Medication 650 MILLIGRAM(S): at 03:56

## 2025-05-25 RX ADMIN — INSULIN LISPRO 7 UNIT(S): 100 INJECTION, SOLUTION INTRAVENOUS; SUBCUTANEOUS at 11:05

## 2025-05-25 RX ADMIN — Medication 650 MILLIGRAM(S): at 12:00

## 2025-05-25 RX ADMIN — INSULIN LISPRO 2: 100 INJECTION, SOLUTION INTRAVENOUS; SUBCUTANEOUS at 13:17

## 2025-05-25 RX ADMIN — LEVETIRACETAM 500 MILLIGRAM(S): 10 INJECTION, SOLUTION INTRAVENOUS at 18:01

## 2025-05-25 RX ADMIN — INSULIN LISPRO 7 UNIT(S): 100 INJECTION, SOLUTION INTRAVENOUS; SUBCUTANEOUS at 13:18

## 2025-05-25 RX ADMIN — Medication 81 MILLIGRAM(S): at 13:22

## 2025-05-25 RX ADMIN — KETOTIFEN FUMARATE 1 DROP(S): 0.35 SOLUTION/ DROPS OPHTHALMIC at 18:02

## 2025-05-25 RX ADMIN — Medication 650 MILLIGRAM(S): at 16:38

## 2025-05-25 RX ADMIN — INSULIN LISPRO 7 UNIT(S): 100 INJECTION, SOLUTION INTRAVENOUS; SUBCUTANEOUS at 18:01

## 2025-05-25 RX ADMIN — Medication 650 MILLIGRAM(S): at 14:47

## 2025-05-25 NOTE — PROVIDER CONTACT NOTE (OTHER) - ACTION/TREATMENT ORDERED:
MD made aware. Lantus adjusted by Endo to 7 U. information discussed with MD via teams. Plan of care ongoing.
as per PA insulin given.we will check after 20 minutes.

## 2025-05-25 NOTE — DISCHARGE NOTE PROVIDER - NSDCFUADDINST_GEN_ALL_CORE_FT
DIET: Consistent Carbohydrate regular diet    WOUND: Please allow steristrips to fall off on their own.     BATHING: Please do not submerge wound underwater. You may shower and/or sponge bathe.  Leave the white steri strips in place, they will fall off on their own in approximately 5-7 days.     ACTIVITY: No heavy lifting anything more than 10-15lbs or straining. Otherwise, you may return to your usual level of physical activity. If you are taking narcotic pain medication (such as oxycodone), do NOT drive a car, operate machinery or make important decisions.  NOTIFY YOUR SURGEON IF: You have any bleeding that does not stop, any pus draining from your wound, any fever (over 100.4 F) or chills, persistent nausea/vomiting with inability to tolerate food or liquids, persistent diarrhea, or if your pain is not controlled on your discharge pain medications.  FOLLOW-UP:  1. Please call to make a follow-up appointment with your surgeon in 1-2 weeks   2. Please follow up with your primary care physician in one week regarding your hospitalization.

## 2025-05-25 NOTE — PROGRESS NOTE ADULT - ASSESSMENT
71 yo F pmhx of IDDM, HTN, HLD, Epilepsy on keppra/vimpat presents for one week of epigastric abdominal pain (worse after eating) and LUQ/LLQ and L flank pain. Patient states that she has been having elevated blood sugars and endorses new pruritic full body rash that began one week ago. Denies fever, chills, chest pain, shortness of breath, new allergens, nausea, vomiting, hematuria, dysuria, hematochezia, constipation, or any other symptoms at this time    choledocholithiases/cholecystitis  - MRCP done  - ERCP done  - s/p lap west  - post op care as per surgery    hyponatremia improved  - nephrology consult following  - fluid restrict to 1.5 L per day    seizure d/o  - c/w keppra    neuropathy  - c/w gabapentin    diabetes  - fs qid  - hgb a1c 8.7  - insulin ss  - insulin AS PER endo consult    constipation  - senna   - mirallax  - lactulose    HLD  - c/w statin  - c/w zetia    dvt px  - sq heparin

## 2025-05-25 NOTE — PROGRESS NOTE ADULT - ASSESSMENT
71 yo F pmhx of IDDM, HTN, HLD, Epilepsy on keppra/vimpat presents for one week of epigastric abdominal pain, found to have choledocholithiases now status post ERCP with removal and balloon extraction of stones. General Surgery was consulted for interval CCY. Pt now s/p lap west on 5/24.     Plan  -CC diet  -Pain Control   -DVT ppx   -Appreciate endocrine recommendations    ACS  [id number]

## 2025-05-25 NOTE — PROVIDER CONTACT NOTE (OTHER) - ASSESSMENT
no s/s of hiper glycemia noted.
pt AOX4. pt denies SOB or chest pain. Pt denies feeling lightheaded. pt asymptomatic. pt bed time FS  47 mg/dL (05-25-25 @ 21:13)Apple juice given. FS repeated 101 mg/dL (05-25-25 @ 21:40) pt with Lantus of 15u at bedtime. pt voiding WDL. pt abd surg inc C/D/I.

## 2025-05-25 NOTE — PROGRESS NOTE ADULT - PROBLEM SELECTOR PLAN 1
Will decrease Lantus to 7 u at bedtime.  Will decrease Admelog to 4  u before each meal and continue Admelog correction scale coverage. Will continue monitoring FS and Follow up.   Patient counseled for compliance with consistent low carb diet and exercise as tolerated outpatient.

## 2025-05-25 NOTE — PROGRESS NOTE ADULT - SUBJECTIVE AND OBJECTIVE BOX
Surgery Progress Note:    OVERNIGHT EVENTS: NAEO    SUBJECTIVE: Pt seen and examined at bedside. Patient comfortable and in no-apparent distress. Pain is controlled. Pt with blood glucoses elevated overnight.     MEDICATIONS  (STANDING):  acetaminophen     Tablet .. 650 milliGRAM(s) Oral every 6 hours  aspirin enteric coated 81 milliGRAM(s) Oral daily  dextrose 5%. 1000 milliLiter(s) (50 mL/Hr) IV Continuous <Continuous>  dextrose 5%. 1000 milliLiter(s) (100 mL/Hr) IV Continuous <Continuous>  dextrose 50% Injectable 25 Gram(s) IV Push once  dextrose 50% Injectable 12.5 Gram(s) IV Push once  dextrose 50% Injectable 25 Gram(s) IV Push once  enoxaparin Injectable 40 milliGRAM(s) SubCutaneous every 24 hours  ezetimibe 10 milliGRAM(s) Oral daily  gabapentin 100 milliGRAM(s) Oral daily  glucagon  Injectable 1 milliGRAM(s) IntraMuscular once  insulin glargine Injectable (LANTUS) 10 Unit(s) SubCutaneous at bedtime  insulin lispro (ADMELOG) corrective regimen sliding scale   SubCutaneous three times a day before meals  insulin lispro (ADMELOG) corrective regimen sliding scale   SubCutaneous at bedtime  insulin lispro Injectable (ADMELOG) 7 Unit(s) SubCutaneous three times a day before meals  ketorolac   Injectable 15 milliGRAM(s) IV Push every 6 hours  ketotifen 0.025% Ophthalmic Solution 1 Drop(s) Both EYES two times a day  levETIRAcetam 500 milliGRAM(s) Oral two times a day  metoprolol succinate ER 12.5 milliGRAM(s) Oral daily  rosuvastatin 20 milliGRAM(s) Oral at bedtime    MEDICATIONS  (PRN):  dextrose Oral Gel 15 Gram(s) Oral once PRN Blood Glucose LESS THAN 70 milliGRAM(s)/deciliter  dextrose Oral Gel 15 Gram(s) Oral once PRN Blood Glucose LESS THAN 70 milliGRAM(s)/deciliter    T(C): 36.7 (05-25-25 @ 05:45), Max: 36.7 (05-25-25 @ 05:45)  HR: 63 (05-25-25 @ 05:45) (63 - 89)  BP: 134/82 (05-25-25 @ 05:45) (109/72 - 139/66)  RR: 18 (05-25-25 @ 05:45) (16 - 18)  SpO2: 99% (05-25-25 @ 05:45) (95% - 100%)    05-24-25 @ 07:01  -  05-25-25 @ 07:00  --------------------------------------------------------  IN: 480 mL / OUT: 1100 mL / NET: -620 mL      LABS:                        11.9   5.54  )-----------( 140      ( 25 May 2025 07:32 )             35.0     05-25    132[L]  |  96  |  9   ----------------------------<  224[H]  4.2   |  23  |  0.60    Ca    8.9      25 May 2025 07:29  Phos  2.7     05-25  Mg     2.0     05-25      PT/INR - ( 24 May 2025 07:14 )   PT: 10.9 sec;   INR: 0.96 ratio         PTT - ( 24 May 2025 07:14 )  PTT:28.8 sec  Urinalysis Basic - ( 25 May 2025 07:29 )    Color: x / Appearance: x / SG: x / pH: x  Gluc: 224 mg/dL / Ketone: x  / Bili: x / Urobili: x   Blood: x / Protein: x / Nitrite: x   Leuk Esterase: x / RBC: x / WBC x   Sq Epi: x / Non Sq Epi: x / Bacteria: x      PE:  Gen: NAD  CV: Pulse regular present  Resp: Nonlabored  Abdomen: Soft, nondistended, mildly tender to palpation, incisions c/d/i

## 2025-05-25 NOTE — PROGRESS NOTE ADULT - ASSESSMENT
71 y/o female with PMH including HTN, HLD, DM, epilepsy. Patient presents to [facility] w/ one week of epigastric abdominal pain (worsened after eating) LUQ/LLQ & L flank pain, endorsing elevated BG and new pruritic full body rash    Assessment  DMT2: 70y Female with DM T2 with hyperglycemia, A1C 8.7% , was on insulin at home, now on basal bolus insulin with coverage, blood sugars running high, labile. sp lap west. Brittle DM very labile sugars, had hypoglycemia  Abd pain: on medications, GI eval, monitored. SP LAP cholecystectomy  HTN: on antihypertensive medications, monitored, asymptomatic.      Discussed plan and management wit Dr Titi Walls MD  Cell: [phone]  Office: [phone]

## 2025-05-25 NOTE — DISCHARGE NOTE PROVIDER - DETAILS OF MALNUTRITION DIAGNOSIS/DIAGNOSES
This patient has been assessed with a concern for Malnutrition and was treated during this hospitalization for the following Nutrition diagnosis/diagnoses:     -  05/21/2025: Severe protein-calorie malnutrition   -  05/21/2025: Underweight (BMI < 19)

## 2025-05-25 NOTE — DISCHARGE NOTE PROVIDER - CARE PROVIDER_API CALL
Lashay Winston  Surgical Critical Care  [address] [place] [address], [address]  [place] [address]  Phone: [phone]  Fax: [phone]  Follow Up Time: 2 weeks    Hermes Raza  Gastroenterology  [address] [place] [id number]  Phone: [phone]  Fax: [phone]  Follow Up Time:    Lashay Winston  Surgical Critical Care  [address] [place] [address], [address]  [place] [address]  Phone: [phone]  Fax: [phone]  Follow Up Time: 2 weeks    Hermes Raza  Gastroenterology  [address] [place] [id number]  Phone: [phone]  Fax: [phone]  Follow Up Time: 1 month

## 2025-05-25 NOTE — PROGRESS NOTE ADULT - ASSESSMENT
tenatAssessment:  #Choledocholithiasis  #CBD dilation  #Constipation    CT 5/19: Hyperdensity in the distal common bile duct measuring 0.8 cm, suspicious for choledocholithiasis. Biliary duct dilation. Rectum distended with stool  MRCP 5/20:  Cholelithiasis and choledocholithiasis. Biliary ductal dilatation with 8 mm CBD.  ERCP 5/21: Choledocholithiasis was found. Complete removal was accomplished by biliary sphincterotomy and balloon extraction.    Plan:  - Imaging reviewed and discussed with patient  - +Bm after lactulose  - Monitor GI function   - Daily bowel regimen. Miralax daily. Senna qhs  - Given recurrent episodes of choledocholithiasis discussed with patient recommendation for ccy to reduce number of ERCPs. Surgery consulted, tentatively planned for ccy tomorrow.   - s/p lap west   - post ERCP abd pain  Will order amylase/lipase to r/o pancreatitis.     will follow  I reviewed the overnight course of events on the unit, re-confirming the patient history. I discussed the care with the patient   The plan of care was discussed by the nurse practitioner and modifications were made to the notation where appropriate.   Differential diagnosis and plan of care discussed with patient after the evaluation  35 minutes spent on total encounter of which more than fifty percent of the encounter was spent counseling and/or coordinating care with the attending physician.  Advanced care planning was discussed with patient and family.  Advanced care planning forms were reviewed and discussed.  Risks, benefits and alternatives of gastroenterologic procedures/interventions were discussed in detail and all questions were answered.

## 2025-05-25 NOTE — PROGRESS NOTE ADULT - ASSESSMENT
69 yo F pmhx of IDDM, HTN, HLD, Epilepsy on keppra/vimpat presents for one week of epigastric abdominal pain (worse after eating) and LUQ/LLQ and L flank pain. Nephro called for hyponatremia    Hyponatremia and Pseudohyponatremia  Etiology multifactorial could be hyperglycemia, pain (Siadh)  Corrected for glucose sodium is 132-134 stable  Advised fluid restrict to 1.5 L per day  Needs better control of hyperglycemia  Urine studies pending  Monitor urine OP    Acidosis  Likely in the setting of Hyperglycemia worsening  Monitor  Correct hyperglycemia    Abdominal pain  Per primary  S/p ERCP and removal of bile stones  s/p  lap west on 05/24

## 2025-05-25 NOTE — DISCHARGE NOTE PROVIDER - NSDCMRMEDTOKEN_GEN_ALL_CORE_FT
alendronate 70 mg oral tablet: 1 tab(s) orally once a week  aspirin 81 mg oral delayed release tablet: 1 tab(s) orally once a day  azelastine 0.05% ophthalmic solution: 1 drop(s) in each affected eye 2 times a day  Baqsimi Two Pack 3 mg nasal powder: 3 milligram(s) intranasally as needed  ezetimibe 10 mg oral tablet: 1 tab(s) orally once a day (in the evening)  gabapentin 100 mg oral capsule: 1 cap(s) orally once a day  Lantus Solostar Pen 100 units/mL subcutaneous solution: 2 unit(s) subcutaneous once a day (in the evening)  levETIRAcetam 500 mg oral tablet: 1 tab(s) orally every 12 hours  metoprolol succinate 25 mg oral tablet, extended release: 0.5 tab(s) orally once a day (in the morning)  NovoLOG FlexPen 100 units/mL injectable solution: 8 unit(s) injectable 3 times a day (before meals)  omeprazole 20 mg oral delayed release capsule: 1 cap(s) orally 2 times a day as needed  rosuvastatin 20 mg oral tablet: 1 tab(s) orally once a day (in the morning)  Valtoco 10 mg Dose nasal spray: 1 spray(s) intranasally as needed for when seizure is expected  Vitamin B12 tablet: 1 tablet orally once a day   alendronate 70 mg oral tablet: 1 tab(s) orally once a week  aspirin 81 mg oral delayed release tablet: 1 tab(s) orally once a day  azelastine 0.05% ophthalmic solution: 1 drop(s) in each affected eye 2 times a day  Baqsimi Two Pack 3 mg nasal powder: 3 milligram(s) intranasally as needed  ezetimibe 10 mg oral tablet: 1 tab(s) orally once a day (in the evening)  gabapentin 100 mg oral capsule: 1 cap(s) orally once a day  Home Physical Therapy: As needed  Lantus Solostar Pen 100 units/mL subcutaneous solution: 2 unit(s) subcutaneous once a day (in the evening)  levETIRAcetam 500 mg oral tablet: 1 tab(s) orally every 12 hours  metoprolol succinate 25 mg oral tablet, extended release: 0.5 tab(s) orally once a day (in the morning)  NovoLOG FlexPen 100 units/mL injectable solution: 8 unit(s) injectable 3 times a day (before meals)  omeprazole 20 mg oral delayed release capsule: 1 cap(s) orally 2 times a day as needed  rosuvastatin 20 mg oral tablet: 1 tab(s) orally once a day (in the morning)  Valtoco 10 mg Dose nasal spray: 1 spray(s) intranasally as needed for when seizure is expected  Vitamin B12 tablet: 1 tablet orally once a day   acetaminophen 325 mg oral tablet: 2 tab(s) orally every 6 hours  alendronate 70 mg oral tablet: 1 tab(s) orally once a week  aspirin 81 mg oral delayed release tablet: 1 tab(s) orally once a day  azelastine 0.05% ophthalmic solution: 1 drop(s) in each affected eye 2 times a day  Baqsimi Two Pack 3 mg nasal powder: once as needed  ezetimibe 10 mg oral tablet: 1 tab(s) orally once a day (in the evening)  gabapentin 100 mg oral capsule: 1 cap(s) orally once a day  Home Physical Therapy: As needed  ibuprofen 400 mg oral tablet: 1 tab(s) orally every 6 hours as needed for  mild pain  insulin aspart 100 units/mL injectable solution: 4 unit(s) injectable 3 times a day (before meals)  insulin glargine 100 units/mL subcutaneous solution: 8 unit(s) subcutaneous once a day (at bedtime)  levETIRAcetam 500 mg oral tablet: 1 tab(s) orally every 12 hours  metoprolol succinate 25 mg oral tablet, extended release: 0.5 tab(s) orally once a day (in the morning)  omeprazole 20 mg oral delayed release capsule: 1 cap(s) orally 2 times a day as needed  oxyCODONE 5 mg oral tablet: 1 tab(s) orally every 6 hours as needed for Severe Pain (7 - 10) MDD: 4 tab  rosuvastatin 20 mg oral tablet: 1 tab(s) orally once a day (in the morning)  Valtoco 10 mg Dose nasal spray: 1 spray(s) intranasally as needed for when seizure is expected  Vitamin B12 tablet: 1 tablet orally once a day

## 2025-05-25 NOTE — DISCHARGE NOTE PROVIDER - NSDCCPCAREPLAN_GEN_ALL_CORE_FT
PRINCIPAL DISCHARGE DIAGNOSIS  Diagnosis: Choledocholithiasis with obstruction  Assessment and Plan of Treatment:

## 2025-05-25 NOTE — DISCHARGE NOTE PROVIDER - HOSPITAL COURSE
69 yo F pmhx of IDDM, HTN, HLD, Epilepsy on keppra/vimpat presents for one week of epigastric abdominal pain (worse after eating) and LUQ/LLQ and L flank pain. Patient presented with elevated blood sugars and new pruritic full body rash that began one week ago. 71 yo F pmhx of IDDM, HTN, HLD, Epilepsy on keppra/vimpat presents for one week of epigastric abdominal pain (worse after eating) and LUQ/LLQ and L flank pain. Patient presented with elevated blood sugars and new pruritic full body rash that began one week before admission. CT scan on 5/19: Hyperdensity in the distal common bile duct measuring 0.8 cm, suspicious for choledocholithiasis. Biliary duct dilation. Rectum distended with stool   On 05/21 pt underwent ERCP with biliary sphincterotomy and balloon extraction of stones. Pt remained inpatient for glucose management and was offered cholecystectomy for recurrent cholodocholithiasis. Pt initially refusing surgery but later patient amenable. On 5/24 patient underwent laparoscopic cholecystectomy. Post operative course only notable for hyperglycemia. On POD _ pt was tolerating regular diet, had controlled blood glucoses, and pain controlled. She was deemed eligible for discharge with Home PT per physical therapy recommendations.    69 yo F pmhx of IDDM, HTN, HLD, Epilepsy on keppra/vimpat presents for one week of epigastric abdominal pain (worse after eating) and LUQ/LLQ and L flank pain. Patient presented with elevated blood sugars and new pruritic full body rash that began one week before admission. CT scan on 5/19: Hyperdensity in the distal common bile duct measuring 0.8 cm, suspicious for choledocholithiasis. Biliary duct dilation. Rectum distended with stool   On 05/21 pt underwent ERCP with biliary sphincterotomy and balloon extraction of stones. Pt remained inpatient for glucose management and was offered cholecystectomy for recurrent cholodocholithiasis. Pt initially refusing surgery but later patient amenable. On 5/24 patient underwent laparoscopic cholecystectomy. Post operative course only notable for hyperglycemia. On POD 3 pt was tolerating regular diet, had controlled blood glucoses, and pain controlled. She was deemed eligible for discharge with Home PT per physical therapy recommendations.    71 yo F pmhx of IDDM, HTN, HLD, Epilepsy on keppra/vimpat presents for one week of epigastric abdominal pain (worse after eating) and LUQ/LLQ and L flank pain. Patient presented with elevated blood sugars and new pruritic full body rash that began one week before admission. CT scan on 5/19: Hyperdensity in the distal common bile duct measuring 0.8 cm, suspicious for choledocholithiasis. Biliary duct dilation. Rectum distended with stool   On 05/21 pt underwent ERCP with biliary sphincterotomy and balloon extraction of stones. Pt remained inpatient for glucose management and was offered cholecystectomy for recurrent cholodocholithiasis. Pt initially refusing surgery but later patient amenable. On 5/24 patient underwent laparoscopic cholecystectomy. Post operative course only notable for hyperglycemia - endocrine consulted. Nephrology consulted for hyponatremia - likely SIADH or secondary to hyperglycemia; hyponatremia resolving with fluid restriction. Pt was tolerating regular diet, had controlled blood glucoses, and pain controlled. She was deemed eligible for discharge with Home PT per physical therapy recommendations.    69 yo F pmhx of IDDM, HTN, HLD, Epilepsy on keppra/vimpat presents for one week of epigastric abdominal pain (worse after eating) and LUQ/LLQ and L flank pain. Patient presented with elevated blood sugars and new pruritic full body rash that began one week before admission. CT scan on 5/19: Hyperdensity in the distal common bile duct measuring 0.8 cm, suspicious for choledocholithiasis. Biliary duct dilation. Rectum distended with stool   On 05/21 pt underwent ERCP with biliary sphincterotomy and balloon extraction of stones. Pt remained inpatient for glucose management and was offered cholecystectomy for recurrent cholodocholithiasis. Pt initially refusing surgery but later patient amenable. On 5/24 patient underwent laparoscopic cholecystectomy. Post operative course only notable for hyperglycemia - endocrine consulted. Nephrology consulted for hyponatremia - likely SIADH or secondary to hyperglycemia; hyponatremia resolving with fluid restriction. Pt was tolerating regular diet, had controlled blood glucoses, and pain controlled. She was deemed eligible for discharge with no PT per physical therapy recommendations.

## 2025-05-25 NOTE — DISCHARGE NOTE PROVIDER - PROVIDER TOKENS
PROVIDER:[TOKEN:[[id number]],FOLLOWUP:[2 weeks]],PROVIDER:[TOKEN:[[id number]]] PROVIDER:[TOKEN:[[id number]],FOLLOWUP:[2 weeks]],PROVIDER:[TOKEN:[[id number]],FOLLOWUP:[1 month]]

## 2025-05-25 NOTE — DISCHARGE NOTE PROVIDER - NSDCCPTREATMENT_GEN_ALL_CORE_FT
PRINCIPAL PROCEDURE  Procedure: Laparoscopic cholecystectomy  Findings and Treatment:       SECONDARY PROCEDURE  Procedure: ERCP  Findings and Treatment:      PRINCIPAL PROCEDURE  Procedure: Laparoscopic cholecystectomy  Findings and Treatment: WOUND CARE: keep incisions clean and dry. Your steri strips will fall off on their own - do not scrub them.   BATHING: Please do not submerge wound underwater. You may shower and/or sponge bathe.  ACTIVITY: No heavy lifting or straining. Otherwise, you may return to your usual level of physical activity. If you are taking narcotic pain medication (such as Percocet), do NOT drive a car, operate machinery or make important decisions.  DIET: Return to your usual diet - carb controlled.  NOTIFY YOUR SURGEON IF: You have any bleeding that does not stop, any pus draining from your wound, any fever (over 100.4 F) or chills, persistent nausea/vomiting, persistent diarrhea, or if your pain is not controlled on your discharge pain medications.  FOLLOW-UP:  1. You may follow up at the [facility] in 2-4 weeks. You may follow up with Dr. Winston or any of their partners (Dr. Husain, Bala, Evangelist, Keo, Trudy, Shania, Dileep, Matilde, Joni, Catrachito, Avery). Please call [phone] if you have any questions or concerns regarding your surgery and treatment   2. Please follow up with your primary care physician in one week regarding your hospitalization.      SECONDARY PROCEDURE  Procedure: ERCP  Findings and Treatment:

## 2025-05-25 NOTE — PROGRESS NOTE ADULT - SUBJECTIVE AND OBJECTIVE BOX
DATE OF SERVICE: 05-25-25 @ 12:50    Patient is a 70y old  Female who presents with a chief complaint of abd pain (23 May 2025 10:20)      SUBJECTIVE / OVERNIGHT EVENTS:  No chest pain. No shortness of breath. No complaints. No events overnight.     MEDICATIONS  (STANDING):  acetaminophen     Tablet .. 650 milliGRAM(s) Oral every 6 hours  aspirin enteric coated 81 milliGRAM(s) Oral daily  dextrose 5%. 1000 milliLiter(s) (50 mL/Hr) IV Continuous <Continuous>  dextrose 5%. 1000 milliLiter(s) (100 mL/Hr) IV Continuous <Continuous>  dextrose 50% Injectable 25 Gram(s) IV Push once  dextrose 50% Injectable 12.5 Gram(s) IV Push once  dextrose 50% Injectable 25 Gram(s) IV Push once  enoxaparin Injectable 40 milliGRAM(s) SubCutaneous every 24 hours  ezetimibe 10 milliGRAM(s) Oral daily  gabapentin 100 milliGRAM(s) Oral daily  glucagon  Injectable 1 milliGRAM(s) IntraMuscular once  insulin glargine Injectable (LANTUS) 15 Unit(s) SubCutaneous at bedtime  insulin lispro (ADMELOG) corrective regimen sliding scale   SubCutaneous three times a day before meals  insulin lispro (ADMELOG) corrective regimen sliding scale   SubCutaneous at bedtime  insulin lispro Injectable (ADMELOG) 7 Unit(s) SubCutaneous three times a day before meals  ketorolac   Injectable 15 milliGRAM(s) IV Push every 6 hours  ketotifen 0.025% Ophthalmic Solution 1 Drop(s) Both EYES two times a day  levETIRAcetam 500 milliGRAM(s) Oral two times a day  metoprolol succinate ER 12.5 milliGRAM(s) Oral daily  rosuvastatin 20 milliGRAM(s) Oral at bedtime    MEDICATIONS  (PRN):  dextrose Oral Gel 15 Gram(s) Oral once PRN Blood Glucose LESS THAN 70 milliGRAM(s)/deciliter  dextrose Oral Gel 15 Gram(s) Oral once PRN Blood Glucose LESS THAN 70 milliGRAM(s)/deciliter      Vital Signs Last 24 Hrs  T(C): 36.6 (25 May 2025 09:47), Max: 36.7 (25 May 2025 05:45)  T(F): 97.8 (25 May 2025 09:47), Max: 98 (25 May 2025 05:45)  HR: 76 (25 May 2025 09:47) (63 - 89)  BP: 112/70 (25 May 2025 09:47) (109/72 - 139/66)  BP(mean): 95 (24 May 2025 18:00) (83 - 95)  RR: 16 (25 May 2025 09:47) (16 - 18)  SpO2: 98% (25 May 2025 09:47) (95% - 100%)    Parameters below as of 25 May 2025 09:47  Patient On (Oxygen Delivery Method): room air      CAPILLARY BLOOD GLUCOSE      POCT Blood Glucose.: 323 mg/dL (25 May 2025 10:57)  POCT Blood Glucose.: 318 mg/dL (24 May 2025 21:10)  POCT Blood Glucose.: 290 mg/dL (24 May 2025 15:43)    I&O's Summary    24 May 2025 07:01  -  25 May 2025 07:00  --------------------------------------------------------  IN: 480 mL / OUT: 1100 mL / NET: -620 mL    25 May 2025 07:01  -  25 May 2025 12:50  --------------------------------------------------------  IN: 240 mL / OUT: 0 mL / NET: 240 mL        PHYSICAL EXAM:  GENERAL: NAD, well-developed  HEAD:  Atraumatic, Normocephalic  EYES: EOMI, PERRLA, conjunctiva and sclera clear  NECK: Supple, No JVD  CHEST/LUNG: Clear to auscultation bilaterally; No wheeze  HEART: Regular rate and rhythm; No murmurs, rubs, or gallops  ABDOMEN: Soft, Nontender, Nondistended; Bowel sounds present  EXTREMITIES:  2+ Peripheral Pulses, No clubbing, cyanosis, or edema  PSYCH: AAOx3  NEUROLOGY: non-focal  SKIN: No rashes or lesions    LABS:                        11.9   5.54  )-----------( 140      ( 25 May 2025 07:32 )             35.0     05-25    132[L]  |  96  |  9   ----------------------------<  224[H]  4.2   |  23  |  0.60    Ca    8.9      25 May 2025 07:29  Phos  2.7     05-25  Mg     2.0     05-25      PT/INR - ( 24 May 2025 07:14 )   PT: 10.9 sec;   INR: 0.96 ratio         PTT - ( 24 May 2025 07:14 )  PTT:28.8 sec      Urinalysis Basic - ( 25 May 2025 07:29 )    Color: x / Appearance: x / SG: x / pH: x  Gluc: 224 mg/dL / Ketone: x  / Bili: x / Urobili: x   Blood: x / Protein: x / Nitrite: x   Leuk Esterase: x / RBC: x / WBC x   Sq Epi: x / Non Sq Epi: x / Bacteria: x        RADIOLOGY & ADDITIONAL TESTS:    Imaging Personally Reviewed:    Consultant(s) Notes Reviewed:      Care Discussed with Consultants/Other Providers:

## 2025-05-25 NOTE — PROGRESS NOTE ADULT - SUBJECTIVE AND OBJECTIVE BOX
Chief complaint  Patient is a 70y old  Female who presents with a chief complaint of abd pain (23 May 2025 10:20)         Labs and Fingersticks  CAPILLARY BLOOD GLUCOSE      POCT Blood Glucose.: 47 mg/dL (25 May 2025 21:13)  POCT Blood Glucose.: 121 mg/dL (25 May 2025 17:33)  POCT Blood Glucose.: 240 mg/dL (25 May 2025 13:11)  POCT Blood Glucose.: 323 mg/dL (25 May 2025 10:57)      Anion Gap: 13 (05-25 @ 07:29)  Anion Gap: 11 (05-24 @ 07:14)      Calcium: 8.9 (05-25 @ 07:29)  Calcium: 9.0 (05-24 @ 07:14)          05-25    132[L]  |  96  |  9   ----------------------------<  224[H]  4.2   |  23  |  0.60    Ca    8.9      25 May 2025 07:29  Phos  2.7     05-25  Mg     2.0     05-25                          11.9   5.54  )-----------( 140      ( 25 May 2025 07:32 )             35.0     Medications  MEDICATIONS  (STANDING):  acetaminophen     Tablet .. 650 milliGRAM(s) Oral every 6 hours  aspirin enteric coated 81 milliGRAM(s) Oral daily  dextrose 5%. 1000 milliLiter(s) (50 mL/Hr) IV Continuous <Continuous>  dextrose 5%. 1000 milliLiter(s) (100 mL/Hr) IV Continuous <Continuous>  dextrose 50% Injectable 25 Gram(s) IV Push once  dextrose 50% Injectable 12.5 Gram(s) IV Push once  dextrose 50% Injectable 25 Gram(s) IV Push once  enoxaparin Injectable 40 milliGRAM(s) SubCutaneous every 24 hours  ezetimibe 10 milliGRAM(s) Oral daily  gabapentin 100 milliGRAM(s) Oral daily  glucagon  Injectable 1 milliGRAM(s) IntraMuscular once  insulin glargine Injectable (LANTUS) 15 Unit(s) SubCutaneous at bedtime  insulin lispro (ADMELOG) corrective regimen sliding scale   SubCutaneous three times a day before meals  insulin lispro (ADMELOG) corrective regimen sliding scale   SubCutaneous at bedtime  insulin lispro Injectable (ADMELOG) 7 Unit(s) SubCutaneous three times a day before meals  ketorolac   Injectable 15 milliGRAM(s) IV Push every 6 hours  ketotifen 0.025% Ophthalmic Solution 1 Drop(s) Both EYES two times a day  levETIRAcetam 500 milliGRAM(s) Oral two times a day  metoprolol succinate ER 12.5 milliGRAM(s) Oral daily  rosuvastatin 20 milliGRAM(s) Oral at bedtime      Physical Exam  General: Patient comfortable in bed   Vital Signs Last 12 Hrs  T(F): 98.4 (05-25-25 @ 20:46), Max: 98.4 (05-25-25 @ 20:46)  HR: 82 (05-25-25 @ 20:46) (76 - 84)  BP: 114/68 (05-25-25 @ 20:46) (112/70 - 126/65)  BP(mean): --  RR: 18 (05-25-25 @ 20:46) (16 - 18)  SpO2: 96% (05-25-25 @ 20:46) (96% - 98%)    CVS: S1S2   Respiratory: No wheezing, no crepitations  GI: Abdomen soft, bowel sounds positive  Musculoskeletal:  moves all extremities

## 2025-05-25 NOTE — PROGRESS NOTE ADULT - SUBJECTIVE AND OBJECTIVE BOX
[facility]      Martir Suggs NPKeenanC    [address] [place] [address]  [phone]      INTERVAL HPI/OVERNIGHT EVENTS:  Pt s/e  s/p lap west yesterday  crow diet  minimal abd pain          MEDICATIONS  (STANDING):  acetaminophen     Tablet .. 650 milliGRAM(s) Oral every 6 hours  aspirin enteric coated 81 milliGRAM(s) Oral daily  dextrose 5%. 1000 milliLiter(s) (50 mL/Hr) IV Continuous <Continuous>  dextrose 5%. 1000 milliLiter(s) (100 mL/Hr) IV Continuous <Continuous>  dextrose 50% Injectable 25 Gram(s) IV Push once  dextrose 50% Injectable 12.5 Gram(s) IV Push once  dextrose 50% Injectable 25 Gram(s) IV Push once  enoxaparin Injectable 40 milliGRAM(s) SubCutaneous every 24 hours  ezetimibe 10 milliGRAM(s) Oral daily  gabapentin 100 milliGRAM(s) Oral daily  glucagon  Injectable 1 milliGRAM(s) IntraMuscular once  insulin glargine Injectable (LANTUS) 10 Unit(s) SubCutaneous at bedtime  insulin lispro (ADMELOG) corrective regimen sliding scale   SubCutaneous three times a day before meals  insulin lispro (ADMELOG) corrective regimen sliding scale   SubCutaneous at bedtime  insulin lispro Injectable (ADMELOG) 7 Unit(s) SubCutaneous three times a day before meals  ketorolac   Injectable 15 milliGRAM(s) IV Push every 6 hours  ketotifen 0.025% Ophthalmic Solution 1 Drop(s) Both EYES two times a day  levETIRAcetam 500 milliGRAM(s) Oral two times a day  metoprolol succinate ER 12.5 milliGRAM(s) Oral daily  rosuvastatin 20 milliGRAM(s) Oral at bedtime    MEDICATIONS  (PRN):  dextrose Oral Gel 15 Gram(s) Oral once PRN Blood Glucose LESS THAN 70 milliGRAM(s)/deciliter  dextrose Oral Gel 15 Gram(s) Oral once PRN Blood Glucose LESS THAN 70 milliGRAM(s)/deciliter      Allergies    penicillin (Headache)    Intolerances          PHYSICAL EXAM  Vital Signs Last 24 Hrs  T(C): 36.6 (25 May 2025 09:47), Max: 36.7 (25 May 2025 05:45)  T(F): 97.8 (25 May 2025 09:47), Max: 98 (25 May 2025 05:45)  HR: 76 (25 May 2025 09:47) (63 - 89)  BP: 112/70 (25 May 2025 09:47) (109/72 - 139/66)  BP(mean): 95 (24 May 2025 18:00) (83 - 95)  RR: 16 (25 May 2025 09:47) (16 - 18)  SpO2: 98% (25 May 2025 09:47) (95% - 100%)    Parameters below as of 25 May 2025 09:47  Patient On (Oxygen Delivery Method): room air          GENERAL:  Appears stated age  HEENT:  NC/AT  CHEST:  Full & symmetric excursion  HEART:  Regular rhythm  ABDOMEN:  Soft, non-tender, non-distended  EXTEREMITIES:  no cyanosis  SKIN:  No rash  NEURO:  Alert            LABS:                        11.9   5.54  )-----------( 140      ( 25 May 2025 07:32 )             35.0     05-25    132[L]  |  96  |  9   ----------------------------<  224[H]  4.2   |  23  |  0.60    Ca    8.9      25 May 2025 07:29  Phos  2.7     05-25  Mg     2.0     05-25      PT/INR - ( 24 May 2025 07:14 )   PT: 10.9 sec;   INR: 0.96 ratio         PTT - ( 24 May 2025 07:14 )  PTT:28.8 sec      05-24-25 @ 07:01  -  05-25-25 @ 07:00  --------------------------------------------------------  IN: 480 mL / OUT: 1100 mL / NET: -620 mL    05-25-25 @ 07:01  -  05-25-25 @ 10:23  --------------------------------------------------------  IN: 240 mL / OUT: 0 mL / NET: 240 mL                  RADIOLOGY & ADDITIONAL TESTS:

## 2025-05-25 NOTE — PROGRESS NOTE ADULT - SUBJECTIVE AND OBJECTIVE BOX
[facility]   MD SANDRA MARRERO MD RUORU WONG, PA    TEL:  FROM 9 AM to 5 PM ---OFFICE: [phone]  AVAILABLE ON TEAMS    FROM 5 PM - 9 AM PLEASE CALL ANSWERING SERVICE: [phone]    RENAL FOLLOW UP NOTE--Date of Service 05-25-25 @ 09:16  --------------------------------------------------------------------------------  HPI:      Pt seen and examined at bedside.       PAST HISTORY  --------------------------------------------------------------------------------  No significant changes to PMH, PSH, FHx, SHx, unless otherwise noted    ALLERGIES & MEDICATIONS  --------------------------------------------------------------------------------  Allergies    penicillin (Headache)    Intolerances      Standing Inpatient Medications  acetaminophen     Tablet .. 650 milliGRAM(s) Oral every 6 hours  aspirin enteric coated 81 milliGRAM(s) Oral daily  dextrose 5%. 1000 milliLiter(s) IV Continuous <Continuous>  dextrose 5%. 1000 milliLiter(s) IV Continuous <Continuous>  dextrose 50% Injectable 25 Gram(s) IV Push once  dextrose 50% Injectable 12.5 Gram(s) IV Push once  dextrose 50% Injectable 25 Gram(s) IV Push once  enoxaparin Injectable 40 milliGRAM(s) SubCutaneous every 24 hours  ezetimibe 10 milliGRAM(s) Oral daily  gabapentin 100 milliGRAM(s) Oral daily  glucagon  Injectable 1 milliGRAM(s) IntraMuscular once  insulin glargine Injectable (LANTUS) 10 Unit(s) SubCutaneous at bedtime  insulin lispro (ADMELOG) corrective regimen sliding scale   SubCutaneous three times a day before meals  insulin lispro (ADMELOG) corrective regimen sliding scale   SubCutaneous at bedtime  insulin lispro Injectable (ADMELOG) 7 Unit(s) SubCutaneous three times a day before meals  ketorolac   Injectable 15 milliGRAM(s) IV Push every 6 hours  ketotifen 0.025% Ophthalmic Solution 1 Drop(s) Both EYES two times a day  levETIRAcetam 500 milliGRAM(s) Oral two times a day  metoprolol succinate ER 12.5 milliGRAM(s) Oral daily  rosuvastatin 20 milliGRAM(s) Oral at bedtime    PRN Inpatient Medications  dextrose Oral Gel 15 Gram(s) Oral once PRN  dextrose Oral Gel 15 Gram(s) Oral once PRN      REVIEW OF SYSTEMS  --------------------------------------------------------------------------------  General: no fever  MSK: no edema     VITALS/PHYSICAL EXAM  --------------------------------------------------------------------------------  T(C): 36.7 (05-25-25 @ 05:45), Max: 36.7 (05-25-25 @ 05:45)  HR: 63 (05-25-25 @ 05:45) (63 - 89)  BP: 134/82 (05-25-25 @ 05:45) (109/72 - 139/66)  RR: 18 (05-25-25 @ 05:45) (16 - 18)  SpO2: 99% (05-25-25 @ 05:45) (95% - 100%)  Wt(kg): --  Height (cm): 157.5 (05-24-25 @ 13:34)  Weight (kg): 39.9 (05-24-25 @ 13:34)  BMI (kg/m2): 16.1 (05-24-25 @ 13:34)  BSA (m2): 1.35 (05-24-25 @ 13:34)      05-24-25 @ 07:01  -  05-25-25 @ 07:00  --------------------------------------------------------  IN: 480 mL / OUT: 1100 mL / NET: -620 mL      Physical Exam:  	Gen: NAD  	HEENT: MMM  	Pulm: CTA B/L  	CV: S1S2  	Abd: Soft, +BS  	Ext: No LE edema B/L                      Neuro: Awake   	Skin: Warm and Dry   	Vascular access: NO HD catheter             no madera  LABS/STUDIES  --------------------------------------------------------------------------------              11.9   5.54  >-----------<  140      [05-25-25 @ 07:32]              35.0     132  |  96  |  9   ----------------------------<  224      [05-25-25 @ 07:29]  4.2   |  23  |  0.60        Ca     8.9     [05-25-25 @ 07:29]      Mg     2.0     [05-25-25 @ 07:29]      Phos  2.7     [05-25-25 @ 07:29]      PT/INR: PT 10.9 , INR 0.96       [05-24-25 @ 07:14]  PTT: 28.8       [05-24-25 @ 07:14]      Creatinine Trend:  SCr 0.60 [05-25 @ 07:29]  SCr 0.52 [05-24 @ 07:14]  SCr 0.48 [05-23 @ 06:30]  SCr 0.63 [05-22 @ 07:13]  SCr 0.57 [05-21 @ 07:19]    Urinalysis - [05-25-25 @ 07:29]      Color  / Appearance  / SG  / pH       Gluc 224 / Ketone   / Bili  / Urobili        Blood  / Protein  / Leuk Est  / Nitrite       RBC  / WBC  / Hyaline  / Gran  / Sq Epi  / Non Sq Epi  / Bacteria     Urine Creatinine 27      [05-21-25 @ 21:15]  Urine Sodium 111      [05-21-25 @ 21:15]  Urine Osmolality 555      [05-21-25 @ 21:15]    TSH 1.66      [05-21-25 @ 07:21]

## 2025-05-26 LAB
GLUCOSE BLDC GLUCOMTR-MCNC: 138 MG/DL — HIGH (ref 70–99)
GLUCOSE BLDC GLUCOMTR-MCNC: 261 MG/DL — HIGH (ref 70–99)
GLUCOSE BLDC GLUCOMTR-MCNC: 296 MG/DL — HIGH (ref 70–99)
GLUCOSE BLDC GLUCOMTR-MCNC: 71 MG/DL — SIGNIFICANT CHANGE UP (ref 70–99)

## 2025-05-26 RX ORDER — IBUPROFEN 200 MG
400 TABLET ORAL EVERY 6 HOURS
Refills: 0 | Status: DISCONTINUED | OUTPATIENT
Start: 2025-05-26 | End: 2025-05-28

## 2025-05-26 RX ADMIN — KETOTIFEN FUMARATE 1 DROP(S): 0.35 SOLUTION/ DROPS OPHTHALMIC at 05:29

## 2025-05-26 RX ADMIN — Medication 650 MILLIGRAM(S): at 23:32

## 2025-05-26 RX ADMIN — INSULIN LISPRO 4 UNIT(S): 100 INJECTION, SOLUTION INTRAVENOUS; SUBCUTANEOUS at 08:31

## 2025-05-26 RX ADMIN — ENOXAPARIN SODIUM 40 MILLIGRAM(S): 100 INJECTION SUBCUTANEOUS at 12:54

## 2025-05-26 RX ADMIN — LEVETIRACETAM 500 MILLIGRAM(S): 10 INJECTION, SOLUTION INTRAVENOUS at 17:15

## 2025-05-26 RX ADMIN — ROSUVASTATIN CALCIUM 20 MILLIGRAM(S): 20 TABLET, FILM COATED ORAL at 21:27

## 2025-05-26 RX ADMIN — KETOROLAC TROMETHAMINE 15 MILLIGRAM(S): 30 INJECTION, SOLUTION INTRAMUSCULAR; INTRAVENOUS at 12:48

## 2025-05-26 RX ADMIN — Medication 650 MILLIGRAM(S): at 23:02

## 2025-05-26 RX ADMIN — KETOTIFEN FUMARATE 1 DROP(S): 0.35 SOLUTION/ DROPS OPHTHALMIC at 17:15

## 2025-05-26 RX ADMIN — KETOROLAC TROMETHAMINE 15 MILLIGRAM(S): 30 INJECTION, SOLUTION INTRAMUSCULAR; INTRAVENOUS at 05:58

## 2025-05-26 RX ADMIN — INSULIN LISPRO 1: 100 INJECTION, SOLUTION INTRAVENOUS; SUBCUTANEOUS at 21:28

## 2025-05-26 RX ADMIN — KETOROLAC TROMETHAMINE 15 MILLIGRAM(S): 30 INJECTION, SOLUTION INTRAMUSCULAR; INTRAVENOUS at 05:28

## 2025-05-26 RX ADMIN — EZETIMIBE 10 MILLIGRAM(S): 10 TABLET ORAL at 12:54

## 2025-05-26 RX ADMIN — GABAPENTIN 100 MILLIGRAM(S): 400 CAPSULE ORAL at 12:48

## 2025-05-26 RX ADMIN — Medication 650 MILLIGRAM(S): at 17:15

## 2025-05-26 RX ADMIN — Medication 650 MILLIGRAM(S): at 02:32

## 2025-05-26 RX ADMIN — Medication 650 MILLIGRAM(S): at 03:02

## 2025-05-26 RX ADMIN — INSULIN LISPRO 4 UNIT(S): 100 INJECTION, SOLUTION INTRAVENOUS; SUBCUTANEOUS at 12:49

## 2025-05-26 RX ADMIN — INSULIN LISPRO 3: 100 INJECTION, SOLUTION INTRAVENOUS; SUBCUTANEOUS at 12:48

## 2025-05-26 RX ADMIN — Medication 650 MILLIGRAM(S): at 08:31

## 2025-05-26 RX ADMIN — Medication 81 MILLIGRAM(S): at 12:48

## 2025-05-26 RX ADMIN — METOPROLOL SUCCINATE 12.5 MILLIGRAM(S): 50 TABLET, EXTENDED RELEASE ORAL at 05:33

## 2025-05-26 RX ADMIN — INSULIN GLARGINE-YFGN 7 UNIT(S): 100 INJECTION, SOLUTION SUBCUTANEOUS at 21:28

## 2025-05-26 RX ADMIN — LEVETIRACETAM 500 MILLIGRAM(S): 10 INJECTION, SOLUTION INTRAVENOUS at 05:28

## 2025-05-26 NOTE — PROGRESS NOTE ADULT - SUBJECTIVE AND OBJECTIVE BOX
Chief complaint    Patient is a 70y old  Female who presents with a chief complaint of abd pain (23 May 2025 10:20)   Review of systems  Patient appears comfortable.    Labs and Fingersticks  CAPILLARY BLOOD GLUCOSE      POCT Blood Glucose.: 71 mg/dL (26 May 2025 17:07)  POCT Blood Glucose.: 261 mg/dL (26 May 2025 12:32)  POCT Blood Glucose.: 138 mg/dL (26 May 2025 08:17)  POCT Blood Glucose.: 101 mg/dL (25 May 2025 21:40)  POCT Blood Glucose.: 47 mg/dL (25 May 2025 21:13)      Anion Gap: 13 (05-25 @ 07:29)      Calcium: 8.9 (05-25 @ 07:29)          05-25    132[L]  |  96  |  9   ----------------------------<  224[H]  4.2   |  23  |  0.60    Ca    8.9      25 May 2025 07:29  Phos  2.7     05-25  Mg     2.0     05-25                          11.9   5.54  )-----------( 140      ( 25 May 2025 07:32 )             35.0     Medications  MEDICATIONS  (STANDING):  acetaminophen     Tablet .. 650 milliGRAM(s) Oral every 6 hours  aspirin enteric coated 81 milliGRAM(s) Oral daily  dextrose 5%. 1000 milliLiter(s) (100 mL/Hr) IV Continuous <Continuous>  dextrose 5%. 1000 milliLiter(s) (50 mL/Hr) IV Continuous <Continuous>  dextrose 50% Injectable 25 Gram(s) IV Push once  dextrose 50% Injectable 12.5 Gram(s) IV Push once  dextrose 50% Injectable 25 Gram(s) IV Push once  enoxaparin Injectable 40 milliGRAM(s) SubCutaneous every 24 hours  ezetimibe 10 milliGRAM(s) Oral daily  gabapentin 100 milliGRAM(s) Oral daily  glucagon  Injectable 1 milliGRAM(s) IntraMuscular once  insulin glargine Injectable (LANTUS) 7 Unit(s) SubCutaneous at bedtime  insulin lispro (ADMELOG) corrective regimen sliding scale   SubCutaneous three times a day before meals  insulin lispro (ADMELOG) corrective regimen sliding scale   SubCutaneous at bedtime  ketotifen 0.025% Ophthalmic Solution 1 Drop(s) Both EYES two times a day  levETIRAcetam 500 milliGRAM(s) Oral two times a day  metoprolol succinate ER 12.5 milliGRAM(s) Oral daily  rosuvastatin 20 milliGRAM(s) Oral at bedtime      Physical Exam  General: Patient appears comfortable.  Vital Signs Last 12 Hrs  T(F): 99.4 (05-26-25 @ 16:09), Max: 99.4 (05-26-25 @ 16:09)  HR: 69 (05-26-25 @ 16:09) (68 - 71)  BP: 126/67 (05-26-25 @ 16:09) (123/75 - 137/78)  BP(mean): --  RR: 18 (05-26-25 @ 16:09) (18 - 18)  SpO2: 97% (05-26-25 @ 16:09) (96% - 97%)  Neck: No palpable thyroid nodules.  CVS: S1S2, No murmurs  Respiratory: No wheezing, no crepitations  GI: Abdomen soft, non tender.    Diagnostics        Radiology:

## 2025-05-26 NOTE — PROGRESS NOTE ADULT - PROBLEM SELECTOR PLAN 1
Will continue current insulin regimen for now. Will continue monitoring  blood sugars, will Follow up. as tolerated.

## 2025-05-26 NOTE — PROGRESS NOTE ADULT - ASSESSMENT
71 y/o female with PMH including HTN, HLD, DM, epilepsy. Patient presents to [facility] w/ one week of epigastric abdominal pain (worsened after eating) LUQ/LLQ & L flank pain, endorsing elevated BG and new pruritic full body rash    Assessment  DMT2: 70y Female with DM T2 with hyperglycemia, A1C 8.7% , was on insulin at home, now on basal bolus insulin with coverage, blood sugars running high, labile. sp lap west. Brittle DM very labile sugars, had hypoglycemia, sugars are stable now.  Abd pain: on medications, GI eval, monitored. SP LAP cholecystectomy  HTN: on antihypertensive medications, monitored, asymptomatic.    Genny Walls MD  Cell: [phone]  Office: [phone]

## 2025-05-26 NOTE — PROGRESS NOTE ADULT - SUBJECTIVE AND OBJECTIVE BOX
DATE OF SERVICE: 05-26-25 @ 15:06    Patient is a 70y old  Female who presents with a chief complaint of abd pain (23 May 2025 10:20)      SUBJECTIVE / OVERNIGHT EVENTS:  No chest pain. No shortness of breath. No complaints. No events overnight. pain has improved.  passing flatus    MEDICATIONS  (STANDING):  acetaminophen     Tablet .. 650 milliGRAM(s) Oral every 6 hours  aspirin enteric coated 81 milliGRAM(s) Oral daily  dextrose 5%. 1000 milliLiter(s) (100 mL/Hr) IV Continuous <Continuous>  dextrose 5%. 1000 milliLiter(s) (50 mL/Hr) IV Continuous <Continuous>  dextrose 50% Injectable 25 Gram(s) IV Push once  dextrose 50% Injectable 12.5 Gram(s) IV Push once  dextrose 50% Injectable 25 Gram(s) IV Push once  enoxaparin Injectable 40 milliGRAM(s) SubCutaneous every 24 hours  ezetimibe 10 milliGRAM(s) Oral daily  gabapentin 100 milliGRAM(s) Oral daily  glucagon  Injectable 1 milliGRAM(s) IntraMuscular once  insulin glargine Injectable (LANTUS) 7 Unit(s) SubCutaneous at bedtime  insulin lispro (ADMELOG) corrective regimen sliding scale   SubCutaneous three times a day before meals  insulin lispro (ADMELOG) corrective regimen sliding scale   SubCutaneous at bedtime  insulin lispro Injectable (ADMELOG) 4 Unit(s) SubCutaneous three times a day before meals  ketotifen 0.025% Ophthalmic Solution 1 Drop(s) Both EYES two times a day  levETIRAcetam 500 milliGRAM(s) Oral two times a day  metoprolol succinate ER 12.5 milliGRAM(s) Oral daily  rosuvastatin 20 milliGRAM(s) Oral at bedtime    MEDICATIONS  (PRN):  dextrose Oral Gel 15 Gram(s) Oral once PRN Blood Glucose LESS THAN 70 milliGRAM(s)/deciliter  dextrose Oral Gel 15 Gram(s) Oral once PRN Blood Glucose LESS THAN 70 milliGRAM(s)/deciliter      Vital Signs Last 24 Hrs  T(C): 36.8 (26 May 2025 12:34), Max: 36.9 (25 May 2025 20:46)  T(F): 98.2 (26 May 2025 12:34), Max: 98.4 (25 May 2025 20:46)  HR: 71 (26 May 2025 12:34) (67 - 84)  BP: 123/75 (26 May 2025 12:34) (112/63 - 137/78)  BP(mean): --  RR: 18 (26 May 2025 12:34) (18 - 18)  SpO2: 96% (26 May 2025 12:34) (95% - 97%)    Parameters below as of 26 May 2025 12:34  Patient On (Oxygen Delivery Method): room air      CAPILLARY BLOOD GLUCOSE      POCT Blood Glucose.: 261 mg/dL (26 May 2025 12:32)  POCT Blood Glucose.: 138 mg/dL (26 May 2025 08:17)  POCT Blood Glucose.: 101 mg/dL (25 May 2025 21:40)  POCT Blood Glucose.: 47 mg/dL (25 May 2025 21:13)  POCT Blood Glucose.: 121 mg/dL (25 May 2025 17:33)    I&O's Summary    25 May 2025 07:01  -  26 May 2025 07:00  --------------------------------------------------------  IN: 840 mL / OUT: 0 mL / NET: 840 mL        PHYSICAL EXAM:  GENERAL: NAD, well-developed  HEAD:  Atraumatic, Normocephalic  EYES: EOMI, PERRLA, conjunctiva and sclera clear  NECK: Supple, No JVD  CHEST/LUNG: Clear to auscultation bilaterally; No wheeze  HEART: Regular rate and rhythm; No murmurs, rubs, or gallops  ABDOMEN: Soft, Nontender, Nondistended; Bowel sounds present  EXTREMITIES:  2+ Peripheral Pulses, No clubbing, cyanosis, or edema  PSYCH: AAOx3  NEUROLOGY: non-focal  SKIN: No rashes or lesions    LABS:                        11.9   5.54  )-----------( 140      ( 25 May 2025 07:32 )             35.0     05-25    132[L]  |  96  |  9   ----------------------------<  224[H]  4.2   |  23  |  0.60    Ca    8.9      25 May 2025 07:29  Phos  2.7     05-25  Mg     2.0     05-25            Urinalysis Basic - ( 25 May 2025 07:29 )    Color: x / Appearance: x / SG: x / pH: x  Gluc: 224 mg/dL / Ketone: x  / Bili: x / Urobili: x   Blood: x / Protein: x / Nitrite: x   Leuk Esterase: x / RBC: x / WBC x   Sq Epi: x / Non Sq Epi: x / Bacteria: x        RADIOLOGY & ADDITIONAL TESTS:    Imaging Personally Reviewed:    Consultant(s) Notes Reviewed:      Care Discussed with Consultants/Other Providers:

## 2025-05-26 NOTE — PROGRESS NOTE ADULT - ATTENDING COMMENTS
Patient seen and examined and agree with above.   POD #2 s/p laparoscopic cholecystectomy.  No acute events overnight.  She is tolerating her diet and is afebrile.  Very labile blood glucose which is managed with the help of endocrinology.   Admelog and lantus adjusted and will continue to monitor.  Once blood glucose consistently controlled will discharge to home.

## 2025-05-26 NOTE — PROGRESS NOTE ADULT - ASSESSMENT
71 yo F pmhx of IDDM, HTN, HLD, Epilepsy on keppra/vimpat presents for one week of epigastric abdominal pain, found to have choledocholithiases now status post ERCP with removal and balloon extraction of stones. General Surgery was consulted for interval CCY. Pt now s/p lap west on 5/24.     Plan  -CC diet  -Pain Control   -DVT ppx   -Appreciate endocrine recommendations.     ACS  [id number]

## 2025-05-26 NOTE — PROGRESS NOTE ADULT - SUBJECTIVE AND OBJECTIVE BOX
[facility] [place] NEPHROLOGY PRACTICE   MD SANDRA MARRERO MD RUORU WONG, PA    TEL:  FROM 9 AM to 5 PM ---OFFICE: [phone]  AVAILABLE ON TEAMS    FROM 5 PM - 9 AM PLEASE CALL ANSWERING SERVICE: [phone]    RENAL FOLLOW UP NOTE--Date of Service 05-26-25 @ 07:35  --------------------------------------------------------------------------------  HPI:      Pt seen and examined at bedside.   Zhenies SOB, chest pain     PAST HISTORY  --------------------------------------------------------------------------------  No significant changes to PMH, PSH, FHx, SHx, unless otherwise noted    ALLERGIES & MEDICATIONS  --------------------------------------------------------------------------------  Allergies    penicillin (Headache)    Intolerances      Standing Inpatient Medications  acetaminophen     Tablet .. 650 milliGRAM(s) Oral every 6 hours  aspirin enteric coated 81 milliGRAM(s) Oral daily  dextrose 5%. 1000 milliLiter(s) IV Continuous <Continuous>  dextrose 5%. 1000 milliLiter(s) IV Continuous <Continuous>  dextrose 50% Injectable 25 Gram(s) IV Push once  dextrose 50% Injectable 12.5 Gram(s) IV Push once  dextrose 50% Injectable 25 Gram(s) IV Push once  enoxaparin Injectable 40 milliGRAM(s) SubCutaneous every 24 hours  ezetimibe 10 milliGRAM(s) Oral daily  gabapentin 100 milliGRAM(s) Oral daily  glucagon  Injectable 1 milliGRAM(s) IntraMuscular once  insulin glargine Injectable (LANTUS) 7 Unit(s) SubCutaneous at bedtime  insulin lispro (ADMELOG) corrective regimen sliding scale   SubCutaneous three times a day before meals  insulin lispro (ADMELOG) corrective regimen sliding scale   SubCutaneous at bedtime  insulin lispro Injectable (ADMELOG) 4 Unit(s) SubCutaneous three times a day before meals  ketorolac   Injectable 15 milliGRAM(s) IV Push every 6 hours  ketotifen 0.025% Ophthalmic Solution 1 Drop(s) Both EYES two times a day  levETIRAcetam 500 milliGRAM(s) Oral two times a day  metoprolol succinate ER 12.5 milliGRAM(s) Oral daily  rosuvastatin 20 milliGRAM(s) Oral at bedtime    PRN Inpatient Medications  dextrose Oral Gel 15 Gram(s) Oral once PRN  dextrose Oral Gel 15 Gram(s) Oral once PRN      REVIEW OF SYSTEMS  --------------------------------------------------------------------------------  General: no fever  CVS: no chest pain  MSK: no edema     VITALS/PHYSICAL EXAM  --------------------------------------------------------------------------------  T(C): 36.6 (05-26-25 @ 05:20), Max: 36.9 (05-25-25 @ 20:46)  HR: 67 (05-26-25 @ 05:20) (67 - 84)  BP: 112/63 (05-26-25 @ 05:20) (112/63 - 126/65)  RR: 18 (05-26-25 @ 05:20) (16 - 18)  SpO2: 95% (05-26-25 @ 05:20) (95% - 98%)  Wt(kg): --  Height (cm): 157.5 (05-24-25 @ 13:34)  Weight (kg): 39.9 (05-24-25 @ 13:34)  BMI (kg/m2): 16.1 (05-24-25 @ 13:34)  BSA (m2): 1.35 (05-24-25 @ 13:34)      05-25-25 @ 07:01  -  05-26-25 @ 07:00  --------------------------------------------------------  IN: 840 mL / OUT: 0 mL / NET: 840 mL      Physical Exam:  	Gen: NAD  	HEENT: MMM  	Pulm: CTA B/L  	CV: S1S2  	Abd: Soft, +BS  	Ext: No LE edema B/L                      Neuro: Awake   	Skin: Warm and Dry   	Vascular access: NO HD catheter             no madera  LABS/STUDIES  --------------------------------------------------------------------------------              11.9   5.54  >-----------<  140      [05-25-25 @ 07:32]              35.0     132  |  96  |  9   ----------------------------<  224      [05-25-25 @ 07:29]  4.2   |  23  |  0.60        Ca     8.9     [05-25-25 @ 07:29]      Mg     2.0     [05-25-25 @ 07:29]      Phos  2.7     [05-25-25 @ 07:29]            Creatinine Trend:  SCr 0.60 [05-25 @ 07:29]  SCr 0.52 [05-24 @ 07:14]  SCr 0.48 [05-23 @ 06:30]  SCr 0.63 [05-22 @ 07:13]  SCr 0.57 [05-21 @ 07:19]    Urinalysis - [05-25-25 @ 07:29]      Color  / Appearance  / SG  / pH       Gluc 224 / Ketone   / Bili  / Urobili        Blood  / Protein  / Leuk Est  / Nitrite       RBC  / WBC  / Hyaline  / Gran  / Sq Epi  / Non Sq Epi  / Bacteria     Urine Creatinine 27      [05-21-25 @ 21:15]  Urine Sodium 111      [05-21-25 @ 21:15]  Urine Osmolality 555      [05-21-25 @ 21:15]    TSH 1.66      [05-21-25 @ 07:21]

## 2025-05-26 NOTE — PROGRESS NOTE ADULT - SUBJECTIVE AND OBJECTIVE BOX
Surgery Progress Note:    OVERNIGHT EVENTS: NAEO    SUBJECTIVE: Pt seen and examined at bedside. Patient comfortable and in no-apparent distress. Pain is controlled. Pt hypoglycemic overnight but improved after given juice and adjustments to premeal insulin.     MEDICATIONS  (STANDING):  acetaminophen     Tablet .. 650 milliGRAM(s) Oral every 6 hours  aspirin enteric coated 81 milliGRAM(s) Oral daily  dextrose 5%. 1000 milliLiter(s) (50 mL/Hr) IV Continuous <Continuous>  dextrose 5%. 1000 milliLiter(s) (100 mL/Hr) IV Continuous <Continuous>  dextrose 50% Injectable 25 Gram(s) IV Push once  dextrose 50% Injectable 12.5 Gram(s) IV Push once  dextrose 50% Injectable 25 Gram(s) IV Push once  enoxaparin Injectable 40 milliGRAM(s) SubCutaneous every 24 hours  ezetimibe 10 milliGRAM(s) Oral daily  gabapentin 100 milliGRAM(s) Oral daily  glucagon  Injectable 1 milliGRAM(s) IntraMuscular once  insulin glargine Injectable (LANTUS) 7 Unit(s) SubCutaneous at bedtime  insulin lispro (ADMELOG) corrective regimen sliding scale   SubCutaneous three times a day before meals  insulin lispro (ADMELOG) corrective regimen sliding scale   SubCutaneous at bedtime  insulin lispro Injectable (ADMELOG) 4 Unit(s) SubCutaneous three times a day before meals  ketorolac   Injectable 15 milliGRAM(s) IV Push every 6 hours  ketotifen 0.025% Ophthalmic Solution 1 Drop(s) Both EYES two times a day  levETIRAcetam 500 milliGRAM(s) Oral two times a day  metoprolol succinate ER 12.5 milliGRAM(s) Oral daily  rosuvastatin 20 milliGRAM(s) Oral at bedtime    MEDICATIONS  (PRN):  dextrose Oral Gel 15 Gram(s) Oral once PRN Blood Glucose LESS THAN 70 milliGRAM(s)/deciliter  dextrose Oral Gel 15 Gram(s) Oral once PRN Blood Glucose LESS THAN 70 milliGRAM(s)/deciliter    T(C): 36.7 (05-26-25 @ 08:19), Max: 36.9 (05-25-25 @ 20:46)  HR: 68 (05-26-25 @ 08:19) (67 - 84)  BP: 137/78 (05-26-25 @ 08:19) (112/63 - 137/78)  RR: 18 (05-26-25 @ 08:19) (16 - 18)  SpO2: 96% (05-26-25 @ 08:19) (95% - 98%)    05-25-25 @ 07:01  -  05-26-25 @ 07:00  --------------------------------------------------------  IN: 840 mL / OUT: 0 mL / NET: 840 mL      LABS:                        11.9   5.54  )-----------( 140      ( 25 May 2025 07:32 )             35.0     05-25    132[L]  |  96  |  9   ----------------------------<  224[H]  4.2   |  23  |  0.60    Ca    8.9      25 May 2025 07:29  Phos  2.7     05-25  Mg     2.0     05-25        Urinalysis Basic - ( 25 May 2025 07:29 )    Color: x / Appearance: x / SG: x / pH: x  Gluc: 224 mg/dL / Ketone: x  / Bili: x / Urobili: x   Blood: x / Protein: x / Nitrite: x   Leuk Esterase: x / RBC: x / WBC x   Sq Epi: x / Non Sq Epi: x / Bacteria: x      PE:  Gen: NAD  CV: Pulse regular present  Resp: Nonlabored  Abdomen: Soft, nondistended, mildly tender to palpation, incisions c/d/i

## 2025-05-27 LAB
GLUCOSE BLDC GLUCOMTR-MCNC: 244 MG/DL — HIGH (ref 70–99)
GLUCOSE BLDC GLUCOMTR-MCNC: 254 MG/DL — HIGH (ref 70–99)
GLUCOSE BLDC GLUCOMTR-MCNC: 304 MG/DL — HIGH (ref 70–99)
GLUCOSE BLDC GLUCOMTR-MCNC: 351 MG/DL — HIGH (ref 70–99)
GLUCOSE BLDC GLUCOMTR-MCNC: 99 MG/DL — SIGNIFICANT CHANGE UP (ref 70–99)

## 2025-05-27 RX ORDER — INSULIN LISPRO 100 U/ML
4 INJECTION, SOLUTION INTRAVENOUS; SUBCUTANEOUS
Refills: 0 | Status: DISCONTINUED | OUTPATIENT
Start: 2025-05-27 | End: 2025-05-28

## 2025-05-27 RX ORDER — INSULIN GLARGINE-YFGN 100 [IU]/ML
8 INJECTION, SOLUTION SUBCUTANEOUS AT BEDTIME
Refills: 0 | Status: DISCONTINUED | OUTPATIENT
Start: 2025-05-27 | End: 2025-05-28

## 2025-05-27 RX ADMIN — Medication 650 MILLIGRAM(S): at 22:16

## 2025-05-27 RX ADMIN — Medication 650 MILLIGRAM(S): at 06:13

## 2025-05-27 RX ADMIN — INSULIN LISPRO 2: 100 INJECTION, SOLUTION INTRAVENOUS; SUBCUTANEOUS at 17:43

## 2025-05-27 RX ADMIN — Medication 650 MILLIGRAM(S): at 05:43

## 2025-05-27 RX ADMIN — Medication 400 MILLIGRAM(S): at 14:36

## 2025-05-27 RX ADMIN — LEVETIRACETAM 500 MILLIGRAM(S): 10 INJECTION, SOLUTION INTRAVENOUS at 05:43

## 2025-05-27 RX ADMIN — Medication 400 MILLIGRAM(S): at 06:27

## 2025-05-27 RX ADMIN — INSULIN LISPRO 4: 100 INJECTION, SOLUTION INTRAVENOUS; SUBCUTANEOUS at 07:46

## 2025-05-27 RX ADMIN — GABAPENTIN 100 MILLIGRAM(S): 400 CAPSULE ORAL at 11:49

## 2025-05-27 RX ADMIN — Medication 400 MILLIGRAM(S): at 00:30

## 2025-05-27 RX ADMIN — INSULIN GLARGINE-YFGN 8 UNIT(S): 100 INJECTION, SOLUTION SUBCUTANEOUS at 22:17

## 2025-05-27 RX ADMIN — Medication 250 MILLILITER(S): at 14:06

## 2025-05-27 RX ADMIN — KETOTIFEN FUMARATE 1 DROP(S): 0.35 SOLUTION/ DROPS OPHTHALMIC at 05:44

## 2025-05-27 RX ADMIN — ENOXAPARIN SODIUM 40 MILLIGRAM(S): 100 INJECTION SUBCUTANEOUS at 11:49

## 2025-05-27 RX ADMIN — Medication 400 MILLIGRAM(S): at 06:57

## 2025-05-27 RX ADMIN — Medication 81 MILLIGRAM(S): at 11:50

## 2025-05-27 RX ADMIN — Medication 650 MILLIGRAM(S): at 22:45

## 2025-05-27 RX ADMIN — Medication 400 MILLIGRAM(S): at 21:30

## 2025-05-27 RX ADMIN — INSULIN LISPRO 4 UNIT(S): 100 INJECTION, SOLUTION INTRAVENOUS; SUBCUTANEOUS at 17:43

## 2025-05-27 RX ADMIN — ROSUVASTATIN CALCIUM 20 MILLIGRAM(S): 20 TABLET, FILM COATED ORAL at 22:17

## 2025-05-27 RX ADMIN — LEVETIRACETAM 500 MILLIGRAM(S): 10 INJECTION, SOLUTION INTRAVENOUS at 17:58

## 2025-05-27 RX ADMIN — INSULIN LISPRO 5: 100 INJECTION, SOLUTION INTRAVENOUS; SUBCUTANEOUS at 12:18

## 2025-05-27 RX ADMIN — Medication 400 MILLIGRAM(S): at 20:45

## 2025-05-27 RX ADMIN — Medication 650 MILLIGRAM(S): at 11:49

## 2025-05-27 RX ADMIN — KETOTIFEN FUMARATE 1 DROP(S): 0.35 SOLUTION/ DROPS OPHTHALMIC at 17:58

## 2025-05-27 RX ADMIN — EZETIMIBE 10 MILLIGRAM(S): 10 TABLET ORAL at 11:50

## 2025-05-27 RX ADMIN — INSULIN LISPRO 4 UNIT(S): 100 INJECTION, SOLUTION INTRAVENOUS; SUBCUTANEOUS at 12:18

## 2025-05-27 RX ADMIN — Medication 650 MILLIGRAM(S): at 18:29

## 2025-05-27 RX ADMIN — METOPROLOL SUCCINATE 12.5 MILLIGRAM(S): 50 TABLET, EXTENDED RELEASE ORAL at 05:43

## 2025-05-27 RX ADMIN — Medication 400 MILLIGRAM(S): at 00:00

## 2025-05-27 RX ADMIN — Medication 650 MILLIGRAM(S): at 12:19

## 2025-05-27 RX ADMIN — Medication 650 MILLIGRAM(S): at 17:59

## 2025-05-27 RX ADMIN — Medication 400 MILLIGRAM(S): at 14:06

## 2025-05-27 NOTE — PROGRESS NOTE ADULT - SUBJECTIVE AND OBJECTIVE BOX
SURGERY DAILY PROGRESS NOTE:       Subjective / Overnight events:    Fingersticks now 200s-300s      Objective:      MEDICATIONS  (STANDING):  acetaminophen     Tablet .. 650 milliGRAM(s) Oral every 6 hours  aspirin enteric coated 81 milliGRAM(s) Oral daily  dextrose 5%. 1000 milliLiter(s) (100 mL/Hr) IV Continuous <Continuous>  dextrose 5%. 1000 milliLiter(s) (50 mL/Hr) IV Continuous <Continuous>  dextrose 50% Injectable 25 Gram(s) IV Push once  dextrose 50% Injectable 12.5 Gram(s) IV Push once  dextrose 50% Injectable 25 Gram(s) IV Push once  enoxaparin Injectable 40 milliGRAM(s) SubCutaneous every 24 hours  ezetimibe 10 milliGRAM(s) Oral daily  gabapentin 100 milliGRAM(s) Oral daily  glucagon  Injectable 1 milliGRAM(s) IntraMuscular once  ibuprofen  Tablet. 400 milliGRAM(s) Oral every 6 hours  insulin glargine Injectable (LANTUS) 8 Unit(s) SubCutaneous at bedtime  insulin lispro (ADMELOG) corrective regimen sliding scale   SubCutaneous three times a day before meals  insulin lispro (ADMELOG) corrective regimen sliding scale   SubCutaneous at bedtime  insulin lispro Injectable (ADMELOG) 4 Unit(s) SubCutaneous three times a day before meals  ketotifen 0.025% Ophthalmic Solution 1 Drop(s) Both EYES two times a day  levETIRAcetam 500 milliGRAM(s) Oral two times a day  metoprolol succinate ER 12.5 milliGRAM(s) Oral daily  rosuvastatin 20 milliGRAM(s) Oral at bedtime    MEDICATIONS  (PRN):  dextrose Oral Gel 15 Gram(s) Oral once PRN Blood Glucose LESS THAN 70 milliGRAM(s)/deciliter  dextrose Oral Gel 15 Gram(s) Oral once PRN Blood Glucose LESS THAN 70 milliGRAM(s)/deciliter      Vital Signs Last 24 Hrs  T(C): 36.7 (27 May 2025 05:50), Max: 37.4 (26 May 2025 16:09)  T(F): 98 (27 May 2025 05:50), Max: 99.4 (26 May 2025 16:09)  HR: 78 (27 May 2025 05:50) (67 - 78)  BP: 148/84 (27 May 2025 05:50) (122/68 - 148/84)  BP(mean): --  RR: 18 (27 May 2025 05:50) (18 - 18)  SpO2: 97% (27 May 2025 05:50) (96% - 97%)    Parameters below as of 27 May 2025 05:50  Patient On (Oxygen Delivery Method): room air        I&O's Detail      Daily     Daily     LABS:                  PHYSICAL EXAM  Gen: NAD  CV: Pulse regular present  Resp: Nonlabored  Abdomen: Soft, nondistended, mildly tender to palpation, incisions c/d/i

## 2025-05-27 NOTE — PROGRESS NOTE ADULT - ASSESSMENT
69 yo F pmhx of IDDM, HTN, HLD, Epilepsy on keppra/vimpat presents for one week of epigastric abdominal pain, found to have choledocholithiases now status post ERCP with removal and balloon extraction of stones. General Surgery was consulted for interval CCY. Pt now s/p lap west on 5/24. Post operative course complicated by poor glycemic control    Plan  -CC diet  - possible transfer to medicine for glycemic control. Surgically patient is progressing well   -Pain Control   -DVT ppx   -Appreciate endocrine recommendations.     ACS  [id number]

## 2025-05-27 NOTE — PROGRESS NOTE ADULT - PROBLEM SELECTOR PLAN 1
Will increase Lantus to 8 units at bed time.  Will increase Admelog to 4 units before each meal in addition to Admelog correction scale coverage.  Patient counseled for compliance with consistent low carb diet and physical activity as tolerated.  . Will increase Lantus to 8 units at bed time.  Will increase Admelog to 4 units before each meal in addition to Admelog correction scale coverage.  Patient counseled for compliance with consistent low carb diet and physical activity as tolerated.    Can be DC on current insulin regimen If glucose remain acceptable ranges  Can use her home insulin brands  FU with her outp endocrine

## 2025-05-27 NOTE — PROGRESS NOTE ADULT - ASSESSMENT
71 yo F pmhx of IDDM, HTN, HLD, Epilepsy on keppra/vimpat presents for one week of epigastric abdominal pain (worse after eating) and LUQ/LLQ and L flank pain. Nephro called for hyponatremia    A/P  Hyponatremia and Pseudohyponatremia  Etiology multifactorial could be hyperglycemia, pain ([place])  Corrected for glucose sodium wnl  Advised fluid restrict to 1.5 L per day  Needs better control of hyperglycemia  Urine lytes suggest SIADH  No labs today  Monitor urine OP    Acidosis  Likely in the setting of Hyperglycemia worsening  Better  Monitor  Correct hyperglycemia    Abdominal pain  Per primary  S/p ERCP and removal of bile stones  s/p  lap west on 05/24

## 2025-05-27 NOTE — PROGRESS NOTE ADULT - SUBJECTIVE AND OBJECTIVE BOX
DATE OF SERVICE: 05-27-25 @ 09:07    Patient is a 70y old  Female who presents with a chief complaint of abd pain (23 May 2025 10:20)      SUBJECTIVE / OVERNIGHT EVENTS:  No chest pain. No shortness of breath. No complaints. No events overnight.     MEDICATIONS  (STANDING):  acetaminophen     Tablet .. 650 milliGRAM(s) Oral every 6 hours  aspirin enteric coated 81 milliGRAM(s) Oral daily  dextrose 5%. 1000 milliLiter(s) (100 mL/Hr) IV Continuous <Continuous>  dextrose 5%. 1000 milliLiter(s) (50 mL/Hr) IV Continuous <Continuous>  dextrose 50% Injectable 25 Gram(s) IV Push once  dextrose 50% Injectable 12.5 Gram(s) IV Push once  dextrose 50% Injectable 25 Gram(s) IV Push once  enoxaparin Injectable 40 milliGRAM(s) SubCutaneous every 24 hours  ezetimibe 10 milliGRAM(s) Oral daily  gabapentin 100 milliGRAM(s) Oral daily  glucagon  Injectable 1 milliGRAM(s) IntraMuscular once  ibuprofen  Tablet. 400 milliGRAM(s) Oral every 6 hours  insulin glargine Injectable (LANTUS) 8 Unit(s) SubCutaneous at bedtime  insulin lispro (ADMELOG) corrective regimen sliding scale   SubCutaneous three times a day before meals  insulin lispro (ADMELOG) corrective regimen sliding scale   SubCutaneous at bedtime  insulin lispro Injectable (ADMELOG) 4 Unit(s) SubCutaneous three times a day before meals  ketotifen 0.025% Ophthalmic Solution 1 Drop(s) Both EYES two times a day  levETIRAcetam 500 milliGRAM(s) Oral two times a day  metoprolol succinate ER 12.5 milliGRAM(s) Oral daily  rosuvastatin 20 milliGRAM(s) Oral at bedtime    MEDICATIONS  (PRN):  dextrose Oral Gel 15 Gram(s) Oral once PRN Blood Glucose LESS THAN 70 milliGRAM(s)/deciliter  dextrose Oral Gel 15 Gram(s) Oral once PRN Blood Glucose LESS THAN 70 milliGRAM(s)/deciliter      Vital Signs Last 24 Hrs  T(C): 36.7 (27 May 2025 05:50), Max: 37.4 (26 May 2025 16:09)  T(F): 98 (27 May 2025 05:50), Max: 99.4 (26 May 2025 16:09)  HR: 78 (27 May 2025 05:50) (67 - 78)  BP: 148/84 (27 May 2025 05:50) (122/68 - 148/84)  BP(mean): --  RR: 18 (27 May 2025 05:50) (18 - 18)  SpO2: 97% (27 May 2025 05:50) (96% - 97%)    Parameters below as of 27 May 2025 05:50  Patient On (Oxygen Delivery Method): room air      CAPILLARY BLOOD GLUCOSE      POCT Blood Glucose.: 304 mg/dL (27 May 2025 07:28)  POCT Blood Glucose.: 296 mg/dL (26 May 2025 21:08)  POCT Blood Glucose.: 71 mg/dL (26 May 2025 17:07)  POCT Blood Glucose.: 261 mg/dL (26 May 2025 12:32)    I&O's Summary      PHYSICAL EXAM:  GENERAL: NAD, well-developed  HEAD:  Atraumatic, Normocephalic  EYES: EOMI, PERRLA, conjunctiva and sclera clear  NECK: Supple, No JVD  CHEST/LUNG: Clear to auscultation bilaterally; No wheeze  HEART: Regular rate and rhythm; No murmurs, rubs, or gallops  ABDOMEN: Soft, Nontender, Nondistended; Bowel sounds present  EXTREMITIES:  2+ Peripheral Pulses, No clubbing, cyanosis, or edema  PSYCH: AAOx3  NEUROLOGY: non-focal  SKIN: No rashes or lesions    LABS:                    RADIOLOGY & ADDITIONAL TESTS:    Imaging Personally Reviewed:    Consultant(s) Notes Reviewed:      Care Discussed with Consultants/Other Providers:

## 2025-05-27 NOTE — PROGRESS NOTE ADULT - ASSESSMENT
#Choledocholithiasis  #CBD dilation  #Constipation    CT 5/19: Hyperdensity in the distal common bile duct measuring 0.8 cm, suspicious for choledocholithiasis. Biliary duct dilation. Rectum distended with stool  MRCP 5/20:  Cholelithiasis and choledocholithiasis. Biliary ductal dilatation with 8 mm CBD.  ERCP 5/21: Choledocholithiasis was found. Complete removal was accomplished by biliary sphincterotomy and balloon extraction.    Plan:  - Imaging reviewed and discussed with patient  - Monitor GI function   - c/o constipation. Recommend a daily bowel regimen of Miralax daily and Senna qhs. prn dulcolax suppository  - s/p lap west. Recovering appropriately  - prn pain meds  - Diet as per surgery team    will follow    I reviewed the overnight course of events on the unit, re-confirming the patient history. I discussed the care with the patient   The plan of care was discussed by the nurse practitioner and modifications were made to the notation where appropriate.   Differential diagnosis and plan of care discussed with patient after the evaluation  35 minutes spent on total encounter of which more than fifty percent of the encounter was spent counseling and/or coordinating care with the attending physician.  Advanced care planning was discussed with patient and family.  Advanced care planning forms were reviewed and discussed.  Risks, benefits and alternatives of gastroenterologic procedures/interventions were discussed in detail and all questions were answered.

## 2025-05-27 NOTE — PROGRESS NOTE ADULT - ASSESSMENT
69 yo F pmhx of IDDM, HTN, HLD, Epilepsy on keppra/vimpat presents for one week of epigastric abdominal pain (worse after eating) and LUQ/LLQ and L flank pain. Patient states that she has been having elevated blood sugars and endorses new pruritic full body rash that began one week ago. Denies fever, chills, chest pain, shortness of breath, new allergens, nausea, vomiting, hematuria, dysuria, hematochezia, constipation, or any other symptoms at this time    choledocholithiases/cholecystitis  - MRCP done  - ERCP done  - s/p lap west  - post op care as per surgery    hyponatremia improved  - nephrology consult following  - fluid restrict to 1.5 L per day    seizure d/o  - c/w keppra    neuropathy  - c/w gabapentin    uncontrolled diabetes with labile sugars  - fs qid  - hgb a1c 8.7  - insulin ss  - insulin AS PER endo consult    constipation  - senna   - Miralax  - lactulose prn    HLD  - c/w statin  - c/w zetia    dvt px  - sq heparin

## 2025-05-27 NOTE — PROGRESS NOTE ADULT - SUBJECTIVE AND OBJECTIVE BOX
[facility] [place] NEPHROLOGY PRACTICE   MD SANDRA MARRERO MD SAKIL BHUIYAN, MD MARIA SANTIAGO, NP    TEL:  OFFICE: [phone]  From 5pm-7am Answering Service [phone]    -- RENAL FOLLOW UP NOTE ---Date of Service 05-27-25 @ 13:46    Patient is a 70y old  Female who presents with a chief complaint of abd pain       Patient seen and examined at bedside. No chest pain/sob    VITALS:  T(F): 97.6 (05-27-25 @ 11:55), Max: 99.4 (05-26-25 @ 16:09)  HR: 78 (05-27-25 @ 11:55)  BP: 156/77 (05-27-25 @ 11:55)  RR: 18 (05-27-25 @ 11:55)  SpO2: 96% (05-27-25 @ 11:55)  Wt(kg): --        PHYSICAL EXAM:  General: NAD  Neck: No JVD  Respiratory: CTAB, no wheezes, rales or rhonchi  Cardiovascular: S1, S2, RRR  Gastrointestinal: BS+, soft, NT/ND  Extremities: No peripheral edema    Hospital Medications:   MEDICATIONS  (STANDING):  acetaminophen     Tablet .. 650 milliGRAM(s) Oral every 6 hours  aspirin enteric coated 81 milliGRAM(s) Oral daily  dextrose 5%. 1000 milliLiter(s) (100 mL/Hr) IV Continuous <Continuous>  dextrose 5%. 1000 milliLiter(s) (50 mL/Hr) IV Continuous <Continuous>  dextrose 50% Injectable 25 Gram(s) IV Push once  dextrose 50% Injectable 12.5 Gram(s) IV Push once  dextrose 50% Injectable 25 Gram(s) IV Push once  enoxaparin Injectable 40 milliGRAM(s) SubCutaneous every 24 hours  ezetimibe 10 milliGRAM(s) Oral daily  gabapentin 100 milliGRAM(s) Oral daily  glucagon  Injectable 1 milliGRAM(s) IntraMuscular once  ibuprofen  Tablet. 400 milliGRAM(s) Oral every 6 hours  insulin glargine Injectable (LANTUS) 8 Unit(s) SubCutaneous at bedtime  insulin lispro (ADMELOG) corrective regimen sliding scale   SubCutaneous three times a day before meals  insulin lispro (ADMELOG) corrective regimen sliding scale   SubCutaneous at bedtime  insulin lispro Injectable (ADMELOG) 4 Unit(s) SubCutaneous three times a day before meals  ketotifen 0.025% Ophthalmic Solution 1 Drop(s) Both EYES two times a day  levETIRAcetam 500 milliGRAM(s) Oral two times a day  metoprolol succinate ER 12.5 milliGRAM(s) Oral daily  rosuvastatin 20 milliGRAM(s) Oral at bedtime  sodium chloride 0.9% Bolus 500 milliLiter(s) IV Bolus once      LABS:        Creatinine Trend: 0.60 <--, 0.52 <--, 0.48 <--, 0.63 <--, 0.57 <--            Urine Studies:  Urinalysis - [05-25-25 @ 07:29]      Color  / Appearance  / SG  / pH       Gluc 224 / Ketone   / Bili  / Urobili        Blood  / Protein  / Leuk Est  / Nitrite       RBC  / WBC  / Hyaline  / Gran  / Sq Epi  / Non Sq Epi  / Bacteria     Urine Creatinine 27      [05-21-25 @ 21:15]  Urine Sodium 111      [05-21-25 @ 21:15]  Urine Osmolality 555      [05-21-25 @ 21:15]    TSH 1.66      [05-21-25 @ 07:21]        RADIOLOGY & ADDITIONAL STUDIES:

## 2025-05-27 NOTE — PROGRESS NOTE ADULT - ASSESSMENT
69 y/o female with PMH including HTN, HLD, DM, epilepsy. Patient presents to [facility] w/ one week of epigastric abdominal pain (worsened after eating) LUQ/LLQ & L flank pain, endorsing elevated BG and new pruritic full body rash    Assessment  DMT2: 70y Female with brittle DM T2 with hyperglycemia, A1C 8.7% , was on insulin at home, now on basal insulin with coverage, blood sugars running high, on regular diet.  Abd pain: on medications, GI eval, monitored. SP LAP cholecystectomy  HTN: on antihypertensive medications, monitored, asymptomatic.    Genny Walls MD  Cell: [phone]  Office: [phone]             71 y/o female with PMH including HTN, HLD, DM, epilepsy. Patient presents to [facility] w/ one week of epigastric abdominal pain (worsened after eating) LUQ/LLQ & L flank pain, endorsing elevated BG and new pruritic full body rash    Assessment  DMT1: 70y Female with brittle DM T1 with hyperglycemia, A1C 8.7% , was on insulin at home, now on basal insulin with coverage, blood sugars running high, on regular diet. has labile sugars due to DM1   Abd pain: on medications, GI eval, monitored. SP LAP cholecystectomy  HTN: on antihypertensive medications, monitored, asymptomatic.    Genny Walls MD  Cell: [phone]  Office: [phone]

## 2025-05-27 NOTE — PROGRESS NOTE ADULT - ATTENDING COMMENTS
Patient seen and examined and agree with above.   POD #3 s/p laparoscopic cholecystectomy.  No acute events overnight.  She is tolerating her diet and is afebrile. She was complaining of abdominal pain more than usual but no signs of peritonitis.   Continues to have difficult to control blood glucose which is managed with the help of endocrinology.   Admelog and lantus adjusted and will continue to monitor.  Once blood glucose consistently controlled will discharge to home.

## 2025-05-27 NOTE — PROGRESS NOTE ADULT - SUBJECTIVE AND OBJECTIVE BOX
[facility]      Martir Suggs NP-C    [address] [place] [address]  [phone]      INTERVAL HPI/OVERNIGHT EVENTS:  Pt s/e  S/P ccy  C/o appropriate post op abd pain. States pain improving daily  Denies N/V  Tolerating diet  Last BM 3 days ago. Passing gas    MEDICATIONS  (STANDING):  acetaminophen     Tablet .. 650 milliGRAM(s) Oral every 6 hours  aspirin enteric coated 81 milliGRAM(s) Oral daily  dextrose 5%. 1000 milliLiter(s) (100 mL/Hr) IV Continuous <Continuous>  dextrose 5%. 1000 milliLiter(s) (50 mL/Hr) IV Continuous <Continuous>  dextrose 50% Injectable 25 Gram(s) IV Push once  dextrose 50% Injectable 12.5 Gram(s) IV Push once  dextrose 50% Injectable 25 Gram(s) IV Push once  enoxaparin Injectable 40 milliGRAM(s) SubCutaneous every 24 hours  ezetimibe 10 milliGRAM(s) Oral daily  gabapentin 100 milliGRAM(s) Oral daily  glucagon  Injectable 1 milliGRAM(s) IntraMuscular once  ibuprofen  Tablet. 400 milliGRAM(s) Oral every 6 hours  insulin glargine Injectable (LANTUS) 8 Unit(s) SubCutaneous at bedtime  insulin lispro (ADMELOG) corrective regimen sliding scale   SubCutaneous three times a day before meals  insulin lispro (ADMELOG) corrective regimen sliding scale   SubCutaneous at bedtime  insulin lispro Injectable (ADMELOG) 4 Unit(s) SubCutaneous three times a day before meals  ketotifen 0.025% Ophthalmic Solution 1 Drop(s) Both EYES two times a day  levETIRAcetam 500 milliGRAM(s) Oral two times a day  metoprolol succinate ER 12.5 milliGRAM(s) Oral daily  rosuvastatin 20 milliGRAM(s) Oral at bedtime    MEDICATIONS  (PRN):  dextrose Oral Gel 15 Gram(s) Oral once PRN Blood Glucose LESS THAN 70 milliGRAM(s)/deciliter  dextrose Oral Gel 15 Gram(s) Oral once PRN Blood Glucose LESS THAN 70 milliGRAM(s)/deciliter      Allergies    penicillin (Headache)    Intolerances              PHYSICAL EXAM:   Vital Signs:  Vital Signs Last 24 Hrs  T(C): 36.7 (27 May 2025 05:50), Max: 37.4 (26 May 2025 16:09)  T(F): 98 (27 May 2025 05:50), Max: 99.4 (26 May 2025 16:09)  HR: 78 (27 May 2025 05:50) (67 - 78)  BP: 148/84 (27 May 2025 05:50) (122/68 - 148/84)  BP(mean): --  RR: 18 (27 May 2025 05:50) (18 - 18)  SpO2: 97% (27 May 2025 05:50) (96% - 97%)    Parameters below as of 27 May 2025 05:50  Patient On (Oxygen Delivery Method): room air      Daily     Daily     GENERAL:  Appears stated age,   HEENT:  NC/AT,    CHEST:  Full & symmetric excursion,   HEART:  Regular rhythm,  ABDOMEN:  Soft, mild diffuse tenderness to palpation non-distended,  EXTEREMITIES:  no cyanosis  SKIN:  No rash  NEURO:  Alert, oriented      LABS:                RADIOLOGY & ADDITIONAL TESTS:

## 2025-05-27 NOTE — PROGRESS NOTE ADULT - SUBJECTIVE AND OBJECTIVE BOX
Chief complaint    Patient is a 70y old  Female who presents with a chief complaint of abd pain (23 May 2025 10:20)   Review of systems  Patient appears comfortable.    Labs and Fingersticks  CAPILLARY BLOOD GLUCOSE      POCT Blood Glucose.: 304 mg/dL (27 May 2025 07:28)  POCT Blood Glucose.: 296 mg/dL (26 May 2025 21:08)  POCT Blood Glucose.: 71 mg/dL (26 May 2025 17:07)  POCT Blood Glucose.: 261 mg/dL (26 May 2025 12:32)                        Medications  MEDICATIONS  (STANDING):  acetaminophen     Tablet .. 650 milliGRAM(s) Oral every 6 hours  aspirin enteric coated 81 milliGRAM(s) Oral daily  dextrose 5%. 1000 milliLiter(s) (100 mL/Hr) IV Continuous <Continuous>  dextrose 5%. 1000 milliLiter(s) (50 mL/Hr) IV Continuous <Continuous>  dextrose 50% Injectable 25 Gram(s) IV Push once  dextrose 50% Injectable 12.5 Gram(s) IV Push once  dextrose 50% Injectable 25 Gram(s) IV Push once  enoxaparin Injectable 40 milliGRAM(s) SubCutaneous every 24 hours  ezetimibe 10 milliGRAM(s) Oral daily  gabapentin 100 milliGRAM(s) Oral daily  glucagon  Injectable 1 milliGRAM(s) IntraMuscular once  ibuprofen  Tablet. 400 milliGRAM(s) Oral every 6 hours  insulin glargine Injectable (LANTUS) 8 Unit(s) SubCutaneous at bedtime  insulin lispro (ADMELOG) corrective regimen sliding scale   SubCutaneous three times a day before meals  insulin lispro (ADMELOG) corrective regimen sliding scale   SubCutaneous at bedtime  insulin lispro Injectable (ADMELOG) 4 Unit(s) SubCutaneous three times a day before meals  ketotifen 0.025% Ophthalmic Solution 1 Drop(s) Both EYES two times a day  levETIRAcetam 500 milliGRAM(s) Oral two times a day  metoprolol succinate ER 12.5 milliGRAM(s) Oral daily  rosuvastatin 20 milliGRAM(s) Oral at bedtime      Physical Exam  General: Patient appears comfortable.  Vital Signs Last 12 Hrs  T(F): 98 (05-27-25 @ 05:50), Max: 98 (05-27-25 @ 05:50)  HR: 78 (05-27-25 @ 05:50) (67 - 78)  BP: 148/84 (05-27-25 @ 05:50) (148/84 - 148/84)  BP(mean): --  RR: 18 (05-27-25 @ 05:50) (18 - 18)  SpO2: 97% (05-27-25 @ 05:50) (97% - 97%)  Neck: No palpable thyroid nodules.  CVS: S1S2, No murmurs  Respiratory: No wheezing, no crepitations  GI: Abdomen soft, non tender.    Diagnostics        Radiology:

## 2025-05-28 ENCOUNTER — TRANSCRIPTION ENCOUNTER (OUTPATIENT)
Age: 70
End: 2025-05-28

## 2025-05-28 VITALS
OXYGEN SATURATION: 94 % | SYSTOLIC BLOOD PRESSURE: 130 MMHG | HEART RATE: 90 BPM | RESPIRATION RATE: 18 BRPM | TEMPERATURE: 99 F | DIASTOLIC BLOOD PRESSURE: 69 MMHG

## 2025-05-28 LAB
ALBUMIN SERPL ELPH-MCNC: 4 G/DL — SIGNIFICANT CHANGE UP (ref 3.3–5)
ALP SERPL-CCNC: 103 U/L — SIGNIFICANT CHANGE UP (ref 40–120)
ALT FLD-CCNC: 94 U/L — HIGH (ref 10–45)
ANION GAP SERPL CALC-SCNC: 15 MMOL/L — SIGNIFICANT CHANGE UP (ref 5–17)
AST SERPL-CCNC: 92 U/L — HIGH (ref 10–40)
BILIRUB SERPL-MCNC: 0.4 MG/DL — SIGNIFICANT CHANGE UP (ref 0.2–1.2)
BUN SERPL-MCNC: 9 MG/DL — SIGNIFICANT CHANGE UP (ref 7–23)
CALCIUM SERPL-MCNC: 9.3 MG/DL — SIGNIFICANT CHANGE UP (ref 8.4–10.5)
CHLORIDE SERPL-SCNC: 93 MMOL/L — LOW (ref 96–108)
CO2 SERPL-SCNC: 25 MMOL/L — SIGNIFICANT CHANGE UP (ref 22–31)
CREAT SERPL-MCNC: 0.89 MG/DL — SIGNIFICANT CHANGE UP (ref 0.5–1.3)
EGFR: 70 ML/MIN/1.73M2 — SIGNIFICANT CHANGE UP
EGFR: 70 ML/MIN/1.73M2 — SIGNIFICANT CHANGE UP
GLUCOSE BLDC GLUCOMTR-MCNC: 196 MG/DL — HIGH (ref 70–99)
GLUCOSE BLDC GLUCOMTR-MCNC: 324 MG/DL — HIGH (ref 70–99)
GLUCOSE SERPL-MCNC: 277 MG/DL — HIGH (ref 70–99)
HCT VFR BLD CALC: 38.3 % — SIGNIFICANT CHANGE UP (ref 34.5–45)
HGB BLD-MCNC: 13.1 G/DL — SIGNIFICANT CHANGE UP (ref 11.5–15.5)
MAGNESIUM SERPL-MCNC: 1.8 MG/DL — SIGNIFICANT CHANGE UP (ref 1.6–2.6)
MCHC RBC-ENTMCNC: 28.6 PG — SIGNIFICANT CHANGE UP (ref 27–34)
MCHC RBC-ENTMCNC: 34.2 G/DL — SIGNIFICANT CHANGE UP (ref 32–36)
MCV RBC AUTO: 83.6 FL — SIGNIFICANT CHANGE UP (ref 80–100)
NRBC BLD AUTO-RTO: 0 /100 WBCS — SIGNIFICANT CHANGE UP (ref 0–0)
PHOSPHATE SERPL-MCNC: 3 MG/DL — SIGNIFICANT CHANGE UP (ref 2.5–4.5)
PLATELET # BLD AUTO: 187 K/UL — SIGNIFICANT CHANGE UP (ref 150–400)
POTASSIUM SERPL-MCNC: 4.3 MMOL/L — SIGNIFICANT CHANGE UP (ref 3.5–5.3)
POTASSIUM SERPL-SCNC: 4.3 MMOL/L — SIGNIFICANT CHANGE UP (ref 3.5–5.3)
PROT SERPL-MCNC: 6.9 G/DL — SIGNIFICANT CHANGE UP (ref 6–8.3)
RBC # BLD: 4.58 M/UL — SIGNIFICANT CHANGE UP (ref 3.8–5.2)
RBC # FLD: 14.1 % — SIGNIFICANT CHANGE UP (ref 10.3–14.5)
SODIUM SERPL-SCNC: 133 MMOL/L — LOW (ref 135–145)
WBC # BLD: 5.62 K/UL — SIGNIFICANT CHANGE UP (ref 3.8–10.5)
WBC # FLD AUTO: 5.62 K/UL — SIGNIFICANT CHANGE UP (ref 3.8–10.5)

## 2025-05-28 PROCEDURE — 84145 PROCALCITONIN (PCT): CPT

## 2025-05-28 PROCEDURE — 84295 ASSAY OF SERUM SODIUM: CPT

## 2025-05-28 PROCEDURE — 86900 BLOOD TYPING SEROLOGIC ABO: CPT

## 2025-05-28 PROCEDURE — 74183 MRI ABD W/O CNTR FLWD CNTR: CPT

## 2025-05-28 PROCEDURE — 97116 GAIT TRAINING THERAPY: CPT

## 2025-05-28 PROCEDURE — 82607 VITAMIN B-12: CPT

## 2025-05-28 PROCEDURE — 84100 ASSAY OF PHOSPHORUS: CPT

## 2025-05-28 PROCEDURE — 85610 PROTHROMBIN TIME: CPT

## 2025-05-28 PROCEDURE — 83935 ASSAY OF URINE OSMOLALITY: CPT

## 2025-05-28 PROCEDURE — 0241U: CPT

## 2025-05-28 PROCEDURE — 82570 ASSAY OF URINE CREATININE: CPT

## 2025-05-28 PROCEDURE — 96372 THER/PROPH/DIAG INJ SC/IM: CPT

## 2025-05-28 PROCEDURE — 82330 ASSAY OF CALCIUM: CPT

## 2025-05-28 PROCEDURE — 81003 URINALYSIS AUTO W/O SCOPE: CPT

## 2025-05-28 PROCEDURE — 83735 ASSAY OF MAGNESIUM: CPT

## 2025-05-28 PROCEDURE — 76705 ECHO EXAM OF ABDOMEN: CPT

## 2025-05-28 PROCEDURE — 82435 ASSAY OF BLOOD CHLORIDE: CPT

## 2025-05-28 PROCEDURE — 96375 TX/PRO/DX INJ NEW DRUG ADDON: CPT

## 2025-05-28 PROCEDURE — 80053 COMPREHEN METABOLIC PANEL: CPT

## 2025-05-28 PROCEDURE — 80048 BASIC METABOLIC PNL TOTAL CA: CPT

## 2025-05-28 PROCEDURE — 82947 ASSAY GLUCOSE BLOOD QUANT: CPT

## 2025-05-28 PROCEDURE — 82010 KETONE BODYS QUAN: CPT

## 2025-05-28 PROCEDURE — 74177 CT ABD & PELVIS W/CONTRAST: CPT

## 2025-05-28 PROCEDURE — 83690 ASSAY OF LIPASE: CPT

## 2025-05-28 PROCEDURE — 80076 HEPATIC FUNCTION PANEL: CPT

## 2025-05-28 PROCEDURE — C1889: CPT

## 2025-05-28 PROCEDURE — 85730 THROMBOPLASTIN TIME PARTIAL: CPT

## 2025-05-28 PROCEDURE — C1769: CPT

## 2025-05-28 PROCEDURE — 74330 X-RAY BILE/PANC ENDOSCOPY: CPT

## 2025-05-28 PROCEDURE — 86901 BLOOD TYPING SEROLOGIC RH(D): CPT

## 2025-05-28 PROCEDURE — 97161 PT EVAL LOW COMPLEX 20 MIN: CPT

## 2025-05-28 PROCEDURE — 84300 ASSAY OF URINE SODIUM: CPT

## 2025-05-28 PROCEDURE — 99285 EMERGENCY DEPT VISIT HI MDM: CPT

## 2025-05-28 PROCEDURE — 93005 ELECTROCARDIOGRAM TRACING: CPT

## 2025-05-28 PROCEDURE — 82746 ASSAY OF FOLIC ACID SERUM: CPT

## 2025-05-28 PROCEDURE — 83605 ASSAY OF LACTIC ACID: CPT

## 2025-05-28 PROCEDURE — 86850 RBC ANTIBODY SCREEN: CPT

## 2025-05-28 PROCEDURE — C9399: CPT

## 2025-05-28 PROCEDURE — 96374 THER/PROPH/DIAG INJ IV PUSH: CPT

## 2025-05-28 PROCEDURE — 84132 ASSAY OF SERUM POTASSIUM: CPT

## 2025-05-28 PROCEDURE — C1726: CPT

## 2025-05-28 PROCEDURE — 82150 ASSAY OF AMYLASE: CPT

## 2025-05-28 PROCEDURE — 85025 COMPLETE CBC W/AUTO DIFF WBC: CPT

## 2025-05-28 PROCEDURE — 36415 COLL VENOUS BLD VENIPUNCTURE: CPT

## 2025-05-28 PROCEDURE — A9585: CPT

## 2025-05-28 PROCEDURE — 85014 HEMATOCRIT: CPT

## 2025-05-28 PROCEDURE — 85027 COMPLETE CBC AUTOMATED: CPT

## 2025-05-28 PROCEDURE — 83036 HEMOGLOBIN GLYCOSYLATED A1C: CPT

## 2025-05-28 PROCEDURE — 85018 HEMOGLOBIN: CPT

## 2025-05-28 PROCEDURE — 97530 THERAPEUTIC ACTIVITIES: CPT

## 2025-05-28 PROCEDURE — 87637 SARSCOV2&INF A&B&RSV AMP PRB: CPT

## 2025-05-28 PROCEDURE — 84443 ASSAY THYROID STIM HORMONE: CPT

## 2025-05-28 PROCEDURE — 82962 GLUCOSE BLOOD TEST: CPT

## 2025-05-28 PROCEDURE — 88304 TISSUE EXAM BY PATHOLOGIST: CPT

## 2025-05-28 PROCEDURE — 82803 BLOOD GASES ANY COMBINATION: CPT

## 2025-05-28 PROCEDURE — 96361 HYDRATE IV INFUSION ADD-ON: CPT

## 2025-05-28 RX ORDER — ACETAMINOPHEN 500 MG/5ML
2 LIQUID (ML) ORAL
Qty: 0 | Refills: 0 | DISCHARGE
Start: 2025-05-28

## 2025-05-28 RX ORDER — IBUPROFEN 200 MG
1 TABLET ORAL
Qty: 0 | Refills: 0 | DISCHARGE
Start: 2025-05-28

## 2025-05-28 RX ORDER — OXYCODONE HYDROCHLORIDE 30 MG/1
5 TABLET ORAL EVERY 4 HOURS
Refills: 0 | Status: DISCONTINUED | OUTPATIENT
Start: 2025-05-28 | End: 2025-05-28

## 2025-05-28 RX ORDER — GLUCAGON 3 MG/1
0 POWDER NASAL
Qty: 0 | Refills: 0 | DISCHARGE

## 2025-05-28 RX ORDER — INSULIN ASPART 100 [IU]/ML
8 INJECTION, SOLUTION INTRAVENOUS; SUBCUTANEOUS
Refills: 0 | DISCHARGE

## 2025-05-28 RX ORDER — INSULIN GLARGINE-YFGN 100 [IU]/ML
8 INJECTION, SOLUTION SUBCUTANEOUS
Qty: 0 | Refills: 0 | DISCHARGE
Start: 2025-05-28

## 2025-05-28 RX ORDER — OXYCODONE HYDROCHLORIDE 30 MG/1
1 TABLET ORAL
Qty: 8 | Refills: 0
Start: 2025-05-28

## 2025-05-28 RX ORDER — INSULIN GLARGINE-YFGN 100 [IU]/ML
2 INJECTION, SOLUTION SUBCUTANEOUS
Refills: 0 | DISCHARGE

## 2025-05-28 RX ORDER — INSULIN ASPART 100 [IU]/ML
4 INJECTION, SOLUTION INTRAVENOUS; SUBCUTANEOUS
Qty: 0 | Refills: 0 | DISCHARGE
Start: 2025-05-28

## 2025-05-28 RX ORDER — OXYCODONE HYDROCHLORIDE 30 MG/1
2.5 TABLET ORAL EVERY 4 HOURS
Refills: 0 | Status: DISCONTINUED | OUTPATIENT
Start: 2025-05-28 | End: 2025-05-28

## 2025-05-28 RX ORDER — LACTULOSE 10 G/15ML
20 SOLUTION ORAL ONCE
Refills: 0 | Status: DISCONTINUED | OUTPATIENT
Start: 2025-05-28 | End: 2025-05-28

## 2025-05-28 RX ADMIN — Medication 650 MILLIGRAM(S): at 11:30

## 2025-05-28 RX ADMIN — Medication 400 MILLIGRAM(S): at 02:32

## 2025-05-28 RX ADMIN — KETOTIFEN FUMARATE 1 DROP(S): 0.35 SOLUTION/ DROPS OPHTHALMIC at 05:23

## 2025-05-28 RX ADMIN — GABAPENTIN 100 MILLIGRAM(S): 400 CAPSULE ORAL at 11:30

## 2025-05-28 RX ADMIN — INSULIN LISPRO 4: 100 INJECTION, SOLUTION INTRAVENOUS; SUBCUTANEOUS at 13:37

## 2025-05-28 RX ADMIN — OXYCODONE HYDROCHLORIDE 5 MILLIGRAM(S): 30 TABLET ORAL at 09:30

## 2025-05-28 RX ADMIN — EZETIMIBE 10 MILLIGRAM(S): 10 TABLET ORAL at 11:29

## 2025-05-28 RX ADMIN — INSULIN LISPRO 4 UNIT(S): 100 INJECTION, SOLUTION INTRAVENOUS; SUBCUTANEOUS at 08:27

## 2025-05-28 RX ADMIN — METOPROLOL SUCCINATE 12.5 MILLIGRAM(S): 50 TABLET, EXTENDED RELEASE ORAL at 05:23

## 2025-05-28 RX ADMIN — LEVETIRACETAM 500 MILLIGRAM(S): 10 INJECTION, SOLUTION INTRAVENOUS at 05:23

## 2025-05-28 RX ADMIN — Medication 81 MILLIGRAM(S): at 11:29

## 2025-05-28 RX ADMIN — Medication 650 MILLIGRAM(S): at 06:00

## 2025-05-28 RX ADMIN — Medication 400 MILLIGRAM(S): at 03:00

## 2025-05-28 RX ADMIN — OXYCODONE HYDROCHLORIDE 5 MILLIGRAM(S): 30 TABLET ORAL at 08:31

## 2025-05-28 RX ADMIN — INSULIN LISPRO 1: 100 INJECTION, SOLUTION INTRAVENOUS; SUBCUTANEOUS at 08:26

## 2025-05-28 RX ADMIN — Medication 650 MILLIGRAM(S): at 05:23

## 2025-05-28 NOTE — PROGRESS NOTE ADULT - SUBJECTIVE AND OBJECTIVE BOX
DATE OF SERVICE: 05-28-25 @ 10:46    Patient is a 70y old  Female who presents with a chief complaint of abd pain (23 May 2025 10:20)      SUBJECTIVE / OVERNIGHT EVENTS:  c/o abd pain.  no relief from tylenol or motrin.  passing gas, no bm    MEDICATIONS  (STANDING):  acetaminophen     Tablet .. 650 milliGRAM(s) Oral every 6 hours  aspirin enteric coated 81 milliGRAM(s) Oral daily  dextrose 5%. 1000 milliLiter(s) (100 mL/Hr) IV Continuous <Continuous>  dextrose 5%. 1000 milliLiter(s) (50 mL/Hr) IV Continuous <Continuous>  dextrose 50% Injectable 25 Gram(s) IV Push once  dextrose 50% Injectable 12.5 Gram(s) IV Push once  dextrose 50% Injectable 25 Gram(s) IV Push once  enoxaparin Injectable 40 milliGRAM(s) SubCutaneous every 24 hours  ezetimibe 10 milliGRAM(s) Oral daily  gabapentin 100 milliGRAM(s) Oral daily  glucagon  Injectable 1 milliGRAM(s) IntraMuscular once  ibuprofen  Tablet. 400 milliGRAM(s) Oral every 6 hours  insulin glargine Injectable (LANTUS) 8 Unit(s) SubCutaneous at bedtime  insulin lispro (ADMELOG) corrective regimen sliding scale   SubCutaneous at bedtime  insulin lispro (ADMELOG) corrective regimen sliding scale   SubCutaneous three times a day before meals  insulin lispro Injectable (ADMELOG) 4 Unit(s) SubCutaneous three times a day before meals  ketotifen 0.025% Ophthalmic Solution 1 Drop(s) Both EYES two times a day  levETIRAcetam 500 milliGRAM(s) Oral two times a day  metoprolol succinate ER 12.5 milliGRAM(s) Oral daily  rosuvastatin 20 milliGRAM(s) Oral at bedtime    MEDICATIONS  (PRN):  dextrose Oral Gel 15 Gram(s) Oral once PRN Blood Glucose LESS THAN 70 milliGRAM(s)/deciliter  dextrose Oral Gel 15 Gram(s) Oral once PRN Blood Glucose LESS THAN 70 milliGRAM(s)/deciliter  oxyCODONE    IR 2.5 milliGRAM(s) Oral every 4 hours PRN Moderate Pain (4 - 6)  oxyCODONE    IR 5 milliGRAM(s) Oral every 4 hours PRN Severe Pain (7 - 10)      Vital Signs Last 24 Hrs  T(C): 36.7 (28 May 2025 09:00), Max: 36.8 (28 May 2025 00:07)  T(F): 98.1 (28 May 2025 09:00), Max: 98.2 (28 May 2025 00:07)  HR: 72 (28 May 2025 09:00) (68 - 87)  BP: 156/76 (28 May 2025 09:00) (137/74 - 156/77)  BP(mean): --  RR: 18 (28 May 2025 09:00) (18 - 18)  SpO2: 97% (28 May 2025 09:00) (96% - 97%)    Parameters below as of 28 May 2025 09:00  Patient On (Oxygen Delivery Method): room air      CAPILLARY BLOOD GLUCOSE      POCT Blood Glucose.: 196 mg/dL (28 May 2025 08:03)  POCT Blood Glucose.: 99 mg/dL (27 May 2025 21:48)  POCT Blood Glucose.: 244 mg/dL (27 May 2025 17:19)  POCT Blood Glucose.: 254 mg/dL (27 May 2025 16:09)  POCT Blood Glucose.: 351 mg/dL (27 May 2025 12:00)    I&O's Summary    27 May 2025 07:01  -  28 May 2025 07:00  --------------------------------------------------------  IN: 660 mL / OUT: 0 mL / NET: 660 mL    28 May 2025 07:01  -  28 May 2025 10:46  --------------------------------------------------------  IN: 340 mL / OUT: 0 mL / NET: 340 mL        PHYSICAL EXAM:  GENERAL: NAD, well-developed  HEAD:  Atraumatic, Normocephalic  EYES: EOMI, PERRLA, conjunctiva and sclera clear  NECK: Supple, No JVD  CHEST/LUNG: Clear to auscultation bilaterally; No wheeze  HEART: Regular rate and rhythm; No murmurs, rubs, or gallops  ABDOMEN: Soft, Nontender, Nondistended; Bowel sounds present  EXTREMITIES:  2+ Peripheral Pulses, No clubbing, cyanosis, or edema  PSYCH: AAOx3  NEUROLOGY: non-focal  SKIN: No rashes or lesions    LABS:                        13.1   5.62  )-----------( 187      ( 28 May 2025 10:07 )             38.3                     RADIOLOGY & ADDITIONAL TESTS:    Imaging Personally Reviewed:    Consultant(s) Notes Reviewed:      Care Discussed with Consultants/Other Providers:

## 2025-05-28 NOTE — PROGRESS NOTE ADULT - PROBLEM SELECTOR PLAN 1
Lantus to 8 units at bed time.   Admelog to 4 units before each meal in addition to Admelog correction scale coverage.  Patient counseled for compliance with consistent low carb diet and physical activity as tolerated.    Can be DC on current insulin regimen If glucose remain acceptable ranges  Can use her home insulin brands  FU with her outp endocrine

## 2025-05-28 NOTE — CHART NOTE - NSCHARTNOTEFT_GEN_A_CORE
HI. I am routing again as I am not sure it was sent.  Thanks!
NUTRITION FOLLOW UP NOTE    PATIENT SEEN FOR: nutrition follow-up    SOURCE: [x] Patient  [x] Current Medical Record  [] RN  [] Family/support person at bedside  [] Patient unavailable/inappropriate  [] Other:    CHART REVIEWED/EVENTS NOTED.  [] No changes to nutrition care plan to note  [x] Nutrition Status:  -s/p lap west on 5/24. Post operative course complicated by poor glycemic control  - pt with brittle DM T1, endocrine following for glycemic control       DIET ORDER:   Diet, Consistent Carbohydrate/No Snacks:   1500mL Fluid Restriction ([id number]) (05-25-25)      CURRENT DIET ORDER IS:  [x] Appropriate:  [] Inadequate:  [] Other:    NUTRITION INTAKE/PROVISION:  [x] PO: pt reports fair PO intake, tolerating meals. No acute GI distress noted, but reports some pain around surgical site, reports team aware.   [] Enteral Nutrition:  [] Parenteral Nutrition:    ANTHROPOMETRICS:  Drug Dosing Weight  Height (cm): 157.5 (24 May 2025 13:34)  Weight (kg): 39.9 (24 May 2025 13:34)  BMI (kg/m2): 16.1 (24 May 2025 13:34)  BSA (m2): 1.35 (24 May 2025 13:34)  Weights: no new weights to assess       MEDICATIONS:  MEDICATIONS  (STANDING):  acetaminophen     Tablet .. 650 milliGRAM(s) Oral every 6 hours  aspirin enteric coated 81 milliGRAM(s) Oral daily  dextrose 5%. 1000 milliLiter(s) (100 mL/Hr) IV Continuous <Continuous>  dextrose 5%. 1000 milliLiter(s) (50 mL/Hr) IV Continuous <Continuous>  dextrose 50% Injectable 25 Gram(s) IV Push once  dextrose 50% Injectable 12.5 Gram(s) IV Push once  dextrose 50% Injectable 25 Gram(s) IV Push once  enoxaparin Injectable 40 milliGRAM(s) SubCutaneous every 24 hours  ezetimibe 10 milliGRAM(s) Oral daily  gabapentin 100 milliGRAM(s) Oral daily  glucagon  Injectable 1 milliGRAM(s) IntraMuscular once  ibuprofen  Tablet. 400 milliGRAM(s) Oral every 6 hours  insulin glargine Injectable (LANTUS) 8 Unit(s) SubCutaneous at bedtime  insulin lispro (ADMELOG) corrective regimen sliding scale   SubCutaneous at bedtime  insulin lispro (ADMELOG) corrective regimen sliding scale   SubCutaneous three times a day before meals  insulin lispro Injectable (ADMELOG) 4 Unit(s) SubCutaneous three times a day before meals  ketotifen 0.025% Ophthalmic Solution 1 Drop(s) Both EYES two times a day  levETIRAcetam 500 milliGRAM(s) Oral two times a day  metoprolol succinate ER 12.5 milliGRAM(s) Oral daily  rosuvastatin 20 milliGRAM(s) Oral at bedtime    MEDICATIONS  (PRN):  dextrose Oral Gel 15 Gram(s) Oral once PRN Blood Glucose LESS THAN 70 milliGRAM(s)/deciliter  dextrose Oral Gel 15 Gram(s) Oral once PRN Blood Glucose LESS THAN 70 milliGRAM(s)/deciliter  oxyCODONE    IR 2.5 milliGRAM(s) Oral every 4 hours PRN Moderate Pain (4 - 6)  oxyCODONE    IR 5 milliGRAM(s) Oral every 4 hours PRN Severe Pain (7 - 10)      NUTRITIONALLY PERTINENT LABS:   05-25 Phos 2.7 mg/dL  05-20-25 @ 05:48 a1c 8.7    A1C with Estimated Average Glucose Result: 8.7 % (05-20-25 @ 05:48)      Finger Sticks:  POCT Blood Glucose.: 196 mg/dL (05-28 @ 08:03)  POCT Blood Glucose.: 99 mg/dL (05-27 @ 21:48)  POCT Blood Glucose.: 244 mg/dL (05-27 @ 17:19)  POCT Blood Glucose.: 254 mg/dL (05-27 @ 16:09)  POCT Blood Glucose.: 351 mg/dL (05-27 @ 12:00)      NUTRITIONALLY PERTINENT MEDICATIONS/LABS:  [x] Reviewed  [x] Relevant notes on medications/labs:  fingersticks x 48hrs: 71-351mg/dL     EDEMA:  [x] Reviewed  [] Relevant notes:    GI/ I&O:  [x] Reviewed  [] Relevant notes:  [x] Other: last bowel movement 5/23     SKIN:   [x] No pressure injuries documented, per nursing flowsheet  [] Pressure injury previously noted  [] Change in pressure injury documentation:  [] Other:    ESTIMATED NEEDS:  [x] No change:  [] Updated:  Energy:  8339-4009 kcal/day (30-35 kcal/kg)  Protein: 52-64  g/day (1.3-1.6 g/kg)  Fluid:   ml/day or [x] defer to team  Based on: dosing weight: 39.8Kg       NUTRITION DIAGNOSIS:  [x] Prior Dx: Underweight  Malnutrition (acute-severe)  [] New Dx:    EDUCATION:  [] Yes:  [x] Not appropriate/warranted    NUTRITION CARE PLAN:  1. Diet: continue carbohydrate consistent + 1500ml fluid restriction   2. Multivitamin/mineral supplementation: multivitamin daily     MONITORING AND EVALUATION:   RD remains available upon request and will follow up per protocol.    Luda Gibbs RD, CDN, CDCES, Available on Teams
Pre-Procedure Note    70y    Female    Procedure: laparoscopic cholecystectomy    Diagnosis/Indication: Patient is a 70y old  Female who presents with a chief complaint of abd pain (23 May 2025 10:20)    Attending Physician: Dr. Blanca    PAST MEDICAL & SURGICAL HISTORY:  HLD (hyperlipidemia)      DM (diabetes mellitus)  Type 1      Epilepsy      HTN (hypertension)      Cholelithiasis      No significant past surgical history           CBC Full  -  ( 23 May 2025 06:29 )  WBC Count : 5.94 K/uL  RBC Count : 4.53 M/uL  Hemoglobin : 12.7 g/dL  Hematocrit : 37.6 %  Platelet Count - Automated : 178 K/uL  Mean Cell Volume : 83.0 fl  Mean Cell Hemoglobin : 28.0 pg  Mean Cell Hemoglobin Concentration : 33.8 g/dL  Auto Neutrophil # : x  Auto Lymphocyte # : x  Auto Monocyte # : x  Auto Eosinophil # : x  Auto Basophil # : x  Auto Neutrophil % : x  Auto Lymphocyte % : x  Auto Monocyte % : x  Auto Eosinophil % : x  Auto Basophil % : x    05-23    132[L]  |  97  |  9   ----------------------------<  136[H]  3.7   |  24  |  0.48[L]    Ca    8.4      23 May 2025 06:30    Assessment:   70F PMHx HTN, HLD, Epilepsy on keppra/vimpat p/w abdominal pain. H/o recurrent choledocholithiasis, now planned for interval cholecystectomy    Plan:  -NPO at mn, maintenance fluids  -optimize glycemic control  -4 AM labs: CBC, Coags, T&S, BMP  -consent in chart  -rest of care per primary    ACS/Trauma Surgery  [id number]
STATUS POST:  dao dodson    POST OPERATIVE DAY #: 0    SUBJECTIVE: Pt seen and examined. Appears comfortable. Reports minimal pain, feels much better compared to pre-op. Denies nausea, vomiting, fever, chills. Has ambulated, voided, had some liquids PO.        Vital Signs Last 24 Hrs  T(C): 36.5 (24 May 2025 19:46), Max: 36.5 (24 May 2025 11:50)  T(F): 97.7 (24 May 2025 19:46), Max: 97.7 (24 May 2025 11:50)  HR: 74 (24 May 2025 19:46) (64 - 89)  BP: 122/72 (24 May 2025 19:46) (118/58 - 164/71)  BP(mean): 95 (24 May 2025 18:00) (83 - 95)  RR: 18 (24 May 2025 19:46) (16 - 18)  SpO2: 96% (24 May 2025 19:46) (95% - 100%)    Parameters below as of 24 May 2025 19:46  Patient On (Oxygen Delivery Method): room air      I&O's Summary    23 May 2025 07:01  -  24 May 2025 07:00  --------------------------------------------------------  IN: 240 mL / OUT: 0 mL / NET: 240 mL    24 May 2025 07:01  -  24 May 2025 20:03  --------------------------------------------------------  IN: 0 mL / OUT: 300 mL / NET: -300 mL      I&O's Detail    23 May 2025 07:01  -  24 May 2025 07:00  --------------------------------------------------------  IN:    Oral Fluid: 240 mL  Total IN: 240 mL    OUT:  Total OUT: 0 mL    Total NET: 240 mL      24 May 2025 07:01  -  24 May 2025 20:03  --------------------------------------------------------  IN:  Total IN: 0 mL    OUT:    Oral Fluid: 0 mL    Voided (mL): 300 mL  Total OUT: 300 mL    Total NET: -300 mL          MEDICATIONS  (STANDING):  acetaminophen     Tablet .. 650 milliGRAM(s) Oral every 6 hours  aspirin enteric coated 81 milliGRAM(s) Oral daily  dextrose 5%. 1000 milliLiter(s) (50 mL/Hr) IV Continuous <Continuous>  dextrose 5%. 1000 milliLiter(s) (100 mL/Hr) IV Continuous <Continuous>  dextrose 50% Injectable 25 Gram(s) IV Push once  dextrose 50% Injectable 12.5 Gram(s) IV Push once  dextrose 50% Injectable 25 Gram(s) IV Push once  ezetimibe 10 milliGRAM(s) Oral daily  gabapentin 100 milliGRAM(s) Oral daily  glucagon  Injectable 1 milliGRAM(s) IntraMuscular once  insulin glargine Injectable (LANTUS) 10 Unit(s) SubCutaneous at bedtime  insulin lispro (ADMELOG) corrective regimen sliding scale   SubCutaneous three times a day before meals  insulin lispro (ADMELOG) corrective regimen sliding scale   SubCutaneous at bedtime  insulin lispro Injectable (ADMELOG) 7 Unit(s) SubCutaneous three times a day before meals  ketorolac   Injectable 15 milliGRAM(s) IV Push every 6 hours  ketotifen 0.025% Ophthalmic Solution 1 Drop(s) Both EYES two times a day  levETIRAcetam 500 milliGRAM(s) Oral two times a day  metoprolol succinate ER 12.5 milliGRAM(s) Oral daily  rosuvastatin 20 milliGRAM(s) Oral at bedtime    MEDICATIONS  (PRN):  dextrose Oral Gel 15 Gram(s) Oral once PRN Blood Glucose LESS THAN 70 milliGRAM(s)/deciliter  dextrose Oral Gel 15 Gram(s) Oral once PRN Blood Glucose LESS THAN 70 milliGRAM(s)/deciliter      Physical Exam  Gen: NAD, A&Ox3  Pulm: No respiratory distress, no subcostal retractions  CV: RRR, no JVD  Abd: Soft, NT, ND, obsites in place w/o strikethru  Extremities:  FROM, warm and well perfused, equal bilateral muscle strength    LABS:                        12.9   3.97  )-----------( 154      ( 24 May 2025 07:14 )             37.1     05-24    133[L]  |  97  |  9   ----------------------------<  246[H]  4.0   |  25  |  0.52    Ca    9.0      24 May 2025 07:14      PT/INR - ( 24 May 2025 07:14 )   PT: 10.9 sec;   INR: 0.96 ratio         PTT - ( 24 May 2025 07:14 )  PTT:28.8 sec  Urinalysis Basic - ( 24 May 2025 07:14 )    Color: x / Appearance: x / SG: x / pH: x  Gluc: 246 mg/dL / Ketone: x  / Bili: x / Urobili: x   Blood: x / Protein: x / Nitrite: x   Leuk Esterase: x / RBC: x / WBC x   Sq Epi: x / Non Sq Epi: x / Bacteria: x        RADIOLOGY & ADDITIONAL STUDIES:      A/P: 70y Female w/ recurrent choledocholithiasis now s/p lap west, recovering appropriately     - Diet: CC diet  - Activity: WBAT  - Labs: no AM labs  - multimodal pain control  - DVT ppx  - c/w insulin regimen per Endo  - d/c tomorrow AM if feeling well      ACS/Trauma Surgery  [id number]

## 2025-05-28 NOTE — PROGRESS NOTE ADULT - ASSESSMENT
69 yo F pmhx of IDDM, HTN, HLD, Epilepsy on keppra/vimpat presents for one week of epigastric abdominal pain (worse after eating) and LUQ/LLQ and L flank pain. Patient states that she has been having elevated blood sugars and endorses new pruritic full body rash that began one week ago. Denies fever, chills, chest pain, shortness of breath, new allergens, nausea, vomiting, hematuria, dysuria, hematochezia, constipation, or any other symptoms at this time    choledocholithiases/cholecystitis  - MRCP done  - ERCP done  - s/p lap west  - post op care as per surgery  - may use oxy or tramadol for post op abd pain      hyponatremia improved  - nephrology consult following  - fluid restrict to 1.5 L per day    seizure d/o  - c/w keppra    neuropathy  - c/w gabapentin    uncontrolled diabetes with labile sugars  - fs qid  - hgb a1c 8.7  - insulin ss  - insulin AS PER endo consult    constipation  - senna   - Miralax  - lactulose prn    HLD  - c/w statin  - c/w zetia    dvt px  - sq heparin    d/c planning

## 2025-05-28 NOTE — PROGRESS NOTE ADULT - SUBJECTIVE AND OBJECTIVE BOX
Chief complaint  Patient is a 70y old  Female who presents with a chief complaint of abd pain (23 May 2025 10:20)         Labs and Fingersticks  CAPILLARY BLOOD GLUCOSE      POCT Blood Glucose.: 324 mg/dL (28 May 2025 13:36)  POCT Blood Glucose.: 196 mg/dL (28 May 2025 08:03)  POCT Blood Glucose.: 99 mg/dL (27 May 2025 21:48)  POCT Blood Glucose.: 244 mg/dL (27 May 2025 17:19)  POCT Blood Glucose.: 254 mg/dL (27 May 2025 16:09)      Anion Gap: 15 (05-28 @ 10:07)      Calcium: 9.3 (05-28 @ 10:07)  Albumin: 4.0 (05-28 @ 10:07)    Alanine Aminotransferase (ALT/SGPT): 94 *H* (05-28 @ 10:07)  Alkaline Phosphatase: 103 (05-28 @ 10:07)  Aspartate Aminotransferase (AST/SGOT): 92 *H* (05-28 @ 10:07)        05-28    133[L]  |  93[L]  |  9   ----------------------------<  277[H]  4.3   |  25  |  0.89    Ca    9.3      28 May 2025 10:07  Phos  3.0     05-28  Mg     1.8     05-28    TPro  6.9  /  Alb  4.0  /  TBili  0.4  /  DBili  x   /  AST  92[H]  /  ALT  94[H]  /  AlkPhos  103  05-28                        13.1   5.62  )-----------( 187      ( 28 May 2025 10:07 )             38.3     Medications  MEDICATIONS  (STANDING):  acetaminophen     Tablet .. 650 milliGRAM(s) Oral every 6 hours  aspirin enteric coated 81 milliGRAM(s) Oral daily  dextrose 5%. 1000 milliLiter(s) (100 mL/Hr) IV Continuous <Continuous>  dextrose 5%. 1000 milliLiter(s) (50 mL/Hr) IV Continuous <Continuous>  dextrose 50% Injectable 25 Gram(s) IV Push once  dextrose 50% Injectable 12.5 Gram(s) IV Push once  dextrose 50% Injectable 25 Gram(s) IV Push once  enoxaparin Injectable 40 milliGRAM(s) SubCutaneous every 24 hours  ezetimibe 10 milliGRAM(s) Oral daily  gabapentin 100 milliGRAM(s) Oral daily  glucagon  Injectable 1 milliGRAM(s) IntraMuscular once  ibuprofen  Tablet. 400 milliGRAM(s) Oral every 6 hours  insulin glargine Injectable (LANTUS) 8 Unit(s) SubCutaneous at bedtime  insulin lispro (ADMELOG) corrective regimen sliding scale   SubCutaneous at bedtime  insulin lispro (ADMELOG) corrective regimen sliding scale   SubCutaneous three times a day before meals  insulin lispro Injectable (ADMELOG) 4 Unit(s) SubCutaneous three times a day before meals  ketotifen 0.025% Ophthalmic Solution 1 Drop(s) Both EYES two times a day  lactulose Syrup 20 Gram(s) Oral once  levETIRAcetam 500 milliGRAM(s) Oral two times a day  metoprolol succinate ER 12.5 milliGRAM(s) Oral daily  rosuvastatin 20 milliGRAM(s) Oral at bedtime      Physical Exam  General: Patient comfortable in bed   Vital Signs Last 12 Hrs  T(F): 98.1 (05-28-25 @ 09:00), Max: 98.1 (05-28-25 @ 05:03)  HR: 72 (05-28-25 @ 09:00) (72 - 77)  BP: 156/76 (05-28-25 @ 09:00) (145/77 - 156/76)  BP(mean): --  RR: 18 (05-28-25 @ 09:00) (18 - 18)  SpO2: 97% (05-28-25 @ 09:00) (96% - 97%)    CVS: S1S2   Respiratory: No wheezing, no crepitations  GI: Abdomen soft, bowel sounds positive  Musculoskeletal:  moves all extremities  : Voiding      Chief complaint  Patient is a 70y old  Female who presents with a chief complaint of abd pain (23 May 2025 10:20)         Labs and Fingersticks  CAPILLARY BLOOD GLUCOSE      POCT Blood Glucose.: 324 mg/dL (28 May 2025 13:36)  POCT Blood Glucose.: 196 mg/dL (28 May 2025 08:03)  POCT Blood Glucose.: 99 mg/dL (27 May 2025 21:48)  POCT Blood Glucose.: 244 mg/dL (27 May 2025 17:19)  POCT Blood Glucose.: 254 mg/dL (27 May 2025 16:09)      Anion Gap: 15 (05-28 @ 10:07)      Calcium: 9.3 (05-28 @ 10:07)  Albumin: 4.0 (05-28 @ 10:07)    Alanine Aminotransferase (ALT/SGPT): 94 *H* (05-28 @ 10:07)  Alkaline Phosphatase: 103 (05-28 @ 10:07)  Aspartate Aminotransferase (AST/SGOT): 92 *H* (05-28 @ 10:07)        05-28    133[L]  |  93[L]  |  9   ----------------------------<  277[H]  4.3   |  25  |  0.89    Ca    9.3      28 May 2025 10:07  Phos  3.0     05-28  Mg     1.8     05-28    TPro  6.9  /  Alb  4.0  /  TBili  0.4  /  DBili  x   /  AST  92[H]  /  ALT  94[H]  /  AlkPhos  103  05-28                        13.1   5.62  )-----------( 187      ( 28 May 2025 10:07 )             38.3     Medications  MEDICATIONS  (STANDING):  acetaminophen     Tablet .. 650 milliGRAM(s) Oral every 6 hours  aspirin enteric coated 81 milliGRAM(s) Oral daily  dextrose 5%. 1000 milliLiter(s) (100 mL/Hr) IV Continuous <Continuous>  dextrose 5%. 1000 milliLiter(s) (50 mL/Hr) IV Continuous <Continuous>  dextrose 50% Injectable 25 Gram(s) IV Push once  dextrose 50% Injectable 12.5 Gram(s) IV Push once  dextrose 50% Injectable 25 Gram(s) IV Push once  enoxaparin Injectable 40 milliGRAM(s) SubCutaneous every 24 hours  ezetimibe 10 milliGRAM(s) Oral daily  gabapentin 100 milliGRAM(s) Oral daily  glucagon  Injectable 1 milliGRAM(s) IntraMuscular once  ibuprofen  Tablet. 400 milliGRAM(s) Oral every 6 hours  insulin glargine Injectable (LANTUS) 8 Unit(s) SubCutaneous at bedtime  insulin lispro (ADMELOG) corrective regimen sliding scale   SubCutaneous at bedtime  insulin lispro (ADMELOG) corrective regimen sliding scale   SubCutaneous three times a day before meals  insulin lispro Injectable (ADMELOG) 4 Unit(s) SubCutaneous three times a day before meals  ketotifen 0.025% Ophthalmic Solution 1 Drop(s) Both EYES two times a day  lactulose Syrup 20 Gram(s) Oral once  levETIRAcetam 500 milliGRAM(s) Oral two times a day  metoprolol succinate ER 12.5 milliGRAM(s) Oral daily  rosuvastatin 20 milliGRAM(s) Oral at bedtime      Physical Exam  General: Patient comfortable in bed   Vital Signs Last 12 Hrs  T(F): 98.1 (05-28-25 @ 09:00), Max: 98.1 (05-28-25 @ 05:03)  HR: 72 (05-28-25 @ 09:00) (72 - 77)  BP: 156/76 (05-28-25 @ 09:00) (145/77 - 156/76)  BP(mean): --  RR: 18 (05-28-25 @ 09:00) (18 - 18)  SpO2: 97% (05-28-25 @ 09:00) (96% - 97%)    CVS: S1S2   Respiratory: No wheezing, no crepitations  GI: Abdomen soft, bowel sounds positive  Musculoskeletal:  moves all extremities  : Voiding

## 2025-05-28 NOTE — PROGRESS NOTE ADULT - ASSESSMENT
71 yo F pmhx of IDDM, HTN, HLD, Epilepsy on keppra/vimpat presents for one week of epigastric abdominal pain, found to have choledocholithiases now status post ERCP with removal and balloon extraction of stones. General Surgery was consulted for interval CCY. Pt now s/p lap west on 5/24. Post operative course complicated by poor glycemic control    Plan  -CC diet  - possible transfer to medicine for glycemic control. Surgically patient is progressing well   -Pain Control   -DVT ppx   -Appreciate endocrine recommendations.     ACS  [id number]

## 2025-05-28 NOTE — DISCHARGE NOTE NURSING/CASE MANAGEMENT/SOCIAL WORK - FINANCIAL ASSISTANCE
[facility] provides services at a reduced cost to those who are determined to be eligible through [facility]’s financial assistance program. Information regarding [facility]’s financial assistance program can be found by going to [email or web address] or by calling [phone].

## 2025-05-28 NOTE — DISCHARGE NOTE NURSING/CASE MANAGEMENT/SOCIAL WORK - PATIENT PORTAL LINK FT
You can access the FollowMyHealth Patient Portal offered by [facility] by registering at the following website: [email or web address]. By joining [facility]’s FollowMyHealth portal, you will also be able to view your health information using other applications (apps) compatible with our system.

## 2025-05-28 NOTE — DISCHARGE NOTE NURSING/CASE MANAGEMENT/SOCIAL WORK - NSDCVIVACCINE_GEN_ALL_CORE_FT
COVID-19, mRNA, LNP-S, PF, 30 mcg/0.3 mL dose (Pfizer); 31-Dec-2021 14:42; Cindy Zuleta (UMAIR); [employer]; [id number] (Exp. Date: 21-Jan-2022); IntraMuscular; Deltoid Right.; 0.3 milliLiter(s);

## 2025-05-28 NOTE — PROGRESS NOTE ADULT - PROVIDER SPECIALTY LIST ADULT
Gastroenterology
Internal Medicine
Nephrology
Surgery
Surgery
Endocrinology
Gastroenterology
Internal Medicine
Internal Medicine
Nephrology
Nephrology
Surgery
Gastroenterology
Internal Medicine
Internal Medicine
Nephrology
Internal Medicine
Surgery
Endocrinology

## 2025-05-28 NOTE — PROGRESS NOTE ADULT - SUBJECTIVE AND OBJECTIVE BOX
[facility]      Martir Suggs NPKeenanC    [address] [place] [address]  [phone]      INTERVAL HPI/OVERNIGHT EVENTS:  Pt s/e  Endorses post prandial abd pain, near incision sites, radiating to back. States pain improving however, and less severe than yesterday.   Denies N/V  Reports last BM 4 days ago.  Passing gas    MEDICATIONS  (STANDING):  acetaminophen     Tablet .. 650 milliGRAM(s) Oral every 6 hours  aspirin enteric coated 81 milliGRAM(s) Oral daily  dextrose 5%. 1000 milliLiter(s) (100 mL/Hr) IV Continuous <Continuous>  dextrose 5%. 1000 milliLiter(s) (50 mL/Hr) IV Continuous <Continuous>  dextrose 50% Injectable 25 Gram(s) IV Push once  dextrose 50% Injectable 12.5 Gram(s) IV Push once  dextrose 50% Injectable 25 Gram(s) IV Push once  enoxaparin Injectable 40 milliGRAM(s) SubCutaneous every 24 hours  ezetimibe 10 milliGRAM(s) Oral daily  gabapentin 100 milliGRAM(s) Oral daily  glucagon  Injectable 1 milliGRAM(s) IntraMuscular once  ibuprofen  Tablet. 400 milliGRAM(s) Oral every 6 hours  insulin glargine Injectable (LANTUS) 8 Unit(s) SubCutaneous at bedtime  insulin lispro (ADMELOG) corrective regimen sliding scale   SubCutaneous at bedtime  insulin lispro (ADMELOG) corrective regimen sliding scale   SubCutaneous three times a day before meals  insulin lispro Injectable (ADMELOG) 4 Unit(s) SubCutaneous three times a day before meals  ketotifen 0.025% Ophthalmic Solution 1 Drop(s) Both EYES two times a day  lactulose Syrup 20 Gram(s) Oral once  levETIRAcetam 500 milliGRAM(s) Oral two times a day  metoprolol succinate ER 12.5 milliGRAM(s) Oral daily  rosuvastatin 20 milliGRAM(s) Oral at bedtime    MEDICATIONS  (PRN):  dextrose Oral Gel 15 Gram(s) Oral once PRN Blood Glucose LESS THAN 70 milliGRAM(s)/deciliter  dextrose Oral Gel 15 Gram(s) Oral once PRN Blood Glucose LESS THAN 70 milliGRAM(s)/deciliter  oxyCODONE    IR 2.5 milliGRAM(s) Oral every 4 hours PRN Moderate Pain (4 - 6)  oxyCODONE    IR 5 milliGRAM(s) Oral every 4 hours PRN Severe Pain (7 - 10)      Allergies    penicillin (Headache)    Intolerances              PHYSICAL EXAM:   Vital Signs:  Vital Signs Last 24 Hrs  T(C): 36.7 (28 May 2025 09:00), Max: 36.8 (28 May 2025 00:07)  T(F): 98.1 (28 May 2025 09:00), Max: 98.2 (28 May 2025 00:07)  HR: 72 (28 May 2025 09:00) (68 - 87)  BP: 156/76 (28 May 2025 09:00) (137/74 - 156/76)  BP(mean): --  RR: 18 (28 May 2025 09:00) (18 - 18)  SpO2: 97% (28 May 2025 09:00) (96% - 97%)    Parameters below as of 28 May 2025 09:00  Patient On (Oxygen Delivery Method): room air      Daily     Daily     GENERAL:  Appears stated age,   HEENT:  NC/AT,    CHEST:  Full & symmetric excursion,   HEART:  Regular rhythm,  ABDOMEN:  Soft, non-tender, non-distended,  EXTEREMITIES:  no cyanosis  SKIN:  No rash  NEURO:  Alert, oriented      LABS:                        13.1   5.62  )-----------( 187      ( 28 May 2025 10:07 )             38.3     05-28    133[L]  |  93[L]  |  9   ----------------------------<  277[H]  4.3   |  25  |  0.89    Ca    9.3      28 May 2025 10:07  Phos  3.0     05-28  Mg     1.8     05-28    TPro  6.9  /  Alb  4.0  /  TBili  0.4  /  DBili  x   /  AST  92[H]  /  ALT  94[H]  /  AlkPhos  103  05-28      Urinalysis Basic - ( 28 May 2025 10:07 )    Color: x / Appearance: x / SG: x / pH: x  Gluc: 277 mg/dL / Ketone: x  / Bili: x / Urobili: x   Blood: x / Protein: x / Nitrite: x   Leuk Esterase: x / RBC: x / WBC x   Sq Epi: x / Non Sq Epi: x / Bacteria: x        RADIOLOGY & ADDITIONAL TESTS:

## 2025-05-28 NOTE — PROGRESS NOTE ADULT - PROBLEM SELECTOR PLAN 3
Will continue statin, primary team FU.

## 2025-05-28 NOTE — PROGRESS NOTE ADULT - SUBJECTIVE AND OBJECTIVE BOX
SURGERY DAILY PROGRESS NOTE:       Subjective / Overnight events:    Sugars improved      Objective:      MEDICATIONS  (STANDING):  acetaminophen     Tablet .. 650 milliGRAM(s) Oral every 6 hours  aspirin enteric coated 81 milliGRAM(s) Oral daily  dextrose 5%. 1000 milliLiter(s) (100 mL/Hr) IV Continuous <Continuous>  dextrose 5%. 1000 milliLiter(s) (50 mL/Hr) IV Continuous <Continuous>  dextrose 50% Injectable 25 Gram(s) IV Push once  dextrose 50% Injectable 12.5 Gram(s) IV Push once  dextrose 50% Injectable 25 Gram(s) IV Push once  enoxaparin Injectable 40 milliGRAM(s) SubCutaneous every 24 hours  ezetimibe 10 milliGRAM(s) Oral daily  gabapentin 100 milliGRAM(s) Oral daily  glucagon  Injectable 1 milliGRAM(s) IntraMuscular once  ibuprofen  Tablet. 400 milliGRAM(s) Oral every 6 hours  insulin glargine Injectable (LANTUS) 8 Unit(s) SubCutaneous at bedtime  insulin lispro (ADMELOG) corrective regimen sliding scale   SubCutaneous three times a day before meals  insulin lispro (ADMELOG) corrective regimen sliding scale   SubCutaneous at bedtime  insulin lispro Injectable (ADMELOG) 4 Unit(s) SubCutaneous three times a day before meals  ketotifen 0.025% Ophthalmic Solution 1 Drop(s) Both EYES two times a day  levETIRAcetam 500 milliGRAM(s) Oral two times a day  metoprolol succinate ER 12.5 milliGRAM(s) Oral daily  rosuvastatin 20 milliGRAM(s) Oral at bedtime    MEDICATIONS  (PRN):  dextrose Oral Gel 15 Gram(s) Oral once PRN Blood Glucose LESS THAN 70 milliGRAM(s)/deciliter  dextrose Oral Gel 15 Gram(s) Oral once PRN Blood Glucose LESS THAN 70 milliGRAM(s)/deciliter  oxyCODONE    IR 2.5 milliGRAM(s) Oral every 4 hours PRN Moderate Pain (4 - 6)  oxyCODONE    IR 5 milliGRAM(s) Oral every 4 hours PRN Severe Pain (7 - 10)      Vital Signs Last 24 Hrs  T(C): 36.7 (28 May 2025 05:03), Max: 36.8 (28 May 2025 00:07)  T(F): 98.1 (28 May 2025 05:03), Max: 98.2 (28 May 2025 00:07)  HR: 77 (28 May 2025 05:03) (68 - 87)  BP: 145/77 (28 May 2025 05:03) (137/74 - 156/77)  BP(mean): --  RR: 18 (28 May 2025 05:03) (18 - 18)  SpO2: 96% (28 May 2025 05:03) (96% - 97%)    Parameters below as of 28 May 2025 05:03  Patient On (Oxygen Delivery Method): room air        I&O's Detail    27 May 2025 07:01  -  28 May 2025 07:00  --------------------------------------------------------  IN:    Oral Fluid: 660 mL  Total IN: 660 mL    OUT:  Total OUT: 0 mL    Total NET: 660 mL          Daily     Daily     LABS:                  PHYSICAL EXAM  Gen: NAD  CV: Pulse regular present  Resp: Nonlabored  Abdomen: Soft, nondistended, mildly tender to palpation, incisions c/d/i

## 2025-05-28 NOTE — PROGRESS NOTE ADULT - ASSESSMENT
69 y/o female with PMH including HTN, HLD, DM, epilepsy. Patient presents to [facility] w/ one week of epigastric abdominal pain (worsened after eating) LUQ/LLQ & L flank pain, endorsing elevated BG and new pruritic full body rash    Assessment  DMT1: 70y Female with brittle DM T1 with hyperglycemia, A1C 8.7% , was on insulin at home, now on basal insulin with coverage, blood sugars running high, on regular diet. has labile sugars due to DM1.  Abd pain: on medications, GI eval, monitored. SP LAP cholecystectomy  HTN: on antihypertensive medications, monitored, asymptomatic.      Discussed plan and management with Dr Titi Zafar NP - TEAMS  Genny Walls MD  Cell: [phone]  Office: [phone]        69 y/o female with PMH including HTN, HLD, DM, epilepsy. Patient presents to [facility] w/ one week of epigastric abdominal pain (worsened after eating) LUQ/LLQ & L flank pain, endorsing elevated BG and new pruritic full body rash      Assessment  DMT1: 70y Female with brittle DM T1 with hyperglycemia, A1C 8.7% , was on insulin at home, now on basal insulin with coverage, blood sugars running high, on regular diet. has labile sugars due to DM1.  Abd pain: on medications, GI eval, monitored. SP LAP cholecystectomy  HTN: on antihypertensive medications, monitored, asymptomatic.      Discussed plan and management with Dr Titi Zafar NP - TEAMS  Genny Walls MD  Cell: [phone]  Office: [phone]

## 2025-05-28 NOTE — PROGRESS NOTE ADULT - SUBJECTIVE AND OBJECTIVE BOX
Surgery Progress Note:    OVERNIGHT EVENTS: NAEO    SUBJECTIVE: Pt seen and examined at bedside. Patient comfortable and in no-apparent distress. Pt reporting some additional postoperative pain, but denies nausea or vomiting.     MEDICATIONS  (STANDING):  acetaminophen     Tablet .. 650 milliGRAM(s) Oral every 6 hours  aspirin enteric coated 81 milliGRAM(s) Oral daily  dextrose 5%. 1000 milliLiter(s) (100 mL/Hr) IV Continuous <Continuous>  dextrose 5%. 1000 milliLiter(s) (50 mL/Hr) IV Continuous <Continuous>  dextrose 50% Injectable 25 Gram(s) IV Push once  dextrose 50% Injectable 12.5 Gram(s) IV Push once  dextrose 50% Injectable 25 Gram(s) IV Push once  enoxaparin Injectable 40 milliGRAM(s) SubCutaneous every 24 hours  ezetimibe 10 milliGRAM(s) Oral daily  gabapentin 100 milliGRAM(s) Oral daily  glucagon  Injectable 1 milliGRAM(s) IntraMuscular once  ibuprofen  Tablet. 400 milliGRAM(s) Oral every 6 hours  insulin glargine Injectable (LANTUS) 8 Unit(s) SubCutaneous at bedtime  insulin lispro (ADMELOG) corrective regimen sliding scale   SubCutaneous three times a day before meals  insulin lispro (ADMELOG) corrective regimen sliding scale   SubCutaneous at bedtime  insulin lispro Injectable (ADMELOG) 4 Unit(s) SubCutaneous three times a day before meals  ketotifen 0.025% Ophthalmic Solution 1 Drop(s) Both EYES two times a day  levETIRAcetam 500 milliGRAM(s) Oral two times a day  metoprolol succinate ER 12.5 milliGRAM(s) Oral daily  rosuvastatin 20 milliGRAM(s) Oral at bedtime    MEDICATIONS  (PRN):  dextrose Oral Gel 15 Gram(s) Oral once PRN Blood Glucose LESS THAN 70 milliGRAM(s)/deciliter  dextrose Oral Gel 15 Gram(s) Oral once PRN Blood Glucose LESS THAN 70 milliGRAM(s)/deciliter  oxyCODONE    IR 2.5 milliGRAM(s) Oral every 4 hours PRN Moderate Pain (4 - 6)  oxyCODONE    IR 5 milliGRAM(s) Oral every 4 hours PRN Severe Pain (7 - 10)    T(C): 36.7 (05-28-25 @ 05:03), Max: 36.8 (05-28-25 @ 00:07)  HR: 77 (05-28-25 @ 05:03) (68 - 87)  BP: 145/77 (05-28-25 @ 05:03) (137/74 - 156/77)  RR: 18 (05-28-25 @ 05:03) (18 - 18)  SpO2: 96% (05-28-25 @ 05:03) (96% - 97%)    05-27-25 @ 07:01  -  05-28-25 @ 07:00  --------------------------------------------------------  IN: 660 mL / OUT: 0 mL / NET: 660 mL      LABS:              PE:  Gen: NAD  CV: Pulse regular present  Resp: Nonlabored  Abdomen: Soft, nondistended, tender to palpation in RUQ and epigastrium, incisions dressed with steri strips c/d/i

## 2025-05-28 NOTE — PROGRESS NOTE ADULT - ASSESSMENT
Problem: Falls - Risk of  Goal: *Absence of Falls  Description: Document Jose Ding Fall Risk and appropriate interventions in the flowsheet. Outcome: Progressing Towards Goal  Note: Fall Risk Interventions:  Mobility Interventions: Bed/chair exit alarm,Communicate number of staff needed for ambulation/transfer,Patient to call before getting OOB         Medication Interventions: Bed/chair exit alarm,Evaluate medications/consider consulting pharmacy,Patient to call before getting OOB,Teach patient to arise slowly    Elimination Interventions: Call light in reach,Bed/chair exit alarm,Patient to call for help with toileting needs,Stay With Me (per policy)    History of Falls Interventions: Bed/chair exit alarm,Door open when patient unattended,Room close to nurse's station         Problem: Patient Education: Go to Patient Education Activity  Goal: Patient/Family Education  Outcome: Progressing Towards Goal     Problem: Airway Clearance - Ineffective  Goal: Achieve or maintain patent airway  Outcome: Progressing Towards Goal     Problem: Gas Exchange - Impaired  Goal: Absence of hypoxia  Outcome: Progressing Towards Goal  Goal: Promote optimal lung function  Outcome: Progressing Towards Goal     Problem: Breathing Pattern - Ineffective  Goal: Ability to achieve and maintain a regular respiratory rate  Outcome: Progressing Towards Goal     Problem:  Body Temperature -  Risk of, Imbalanced  Goal: Ability to maintain a body temperature within defined limits  Outcome: Progressing Towards Goal  Goal: Will regain or maintain usual level of consciousness  Outcome: Progressing Towards Goal  Goal: Complications related to the disease process, condition or treatment will be avoided or minimized  Outcome: Progressing Towards Goal     Problem: Nutrition Deficits  Goal: Optimize nutrtional status  Outcome: Progressing Towards Goal     Problem: Risk for Fluid Volume Deficit  Goal: Maintain normal heart rhythm  Outcome: Progressing Towards Goal  Goal: Maintain absence of muscle cramping  Outcome: Progressing Towards Goal  Goal: Maintain normal serum potassium, sodium, calcium, phosphorus, and pH  Outcome: Progressing Towards Goal     Problem: Loneliness or Risk for Loneliness  Goal: Demonstrate positive use of time alone when socialization is not possible  Outcome: Progressing Towards Goal     Problem: Fatigue  Goal: Verbalize increase energy and improved vitality  Outcome: Progressing Towards Goal 71 yo F pmhx of IDDM, HTN, HLD, Epilepsy on keppra/vimpat presents for one week of epigastric abdominal pain (worse after eating) and LUQ/LLQ and L flank pain. Nephro called for hyponatremia    A/P  Hyponatremia and Pseudohyponatremia  Etiology multifactorial could be hyperglycemia, pain ([place])  Corrected for glucose sodium wnl  Advised fluid restrict to 1.5 L per day  Needs better control of hyperglycemia  Urine lytes suggest SIADH  Sodium normal corrected for glucose  Monitor urine OP    Acidosis  Likely in the setting of Hyperglycemia worsening  Better  Monitor  Correct hyperglycemia    Abdominal pain  Per primary  S/p ERCP and removal of bile stones  s/p  lap west on 05/24

## 2025-05-28 NOTE — PROGRESS NOTE ADULT - ASSESSMENT
#Choledocholithiasis  #CBD dilation  #Constipation    CT 5/19: Hyperdensity in the distal common bile duct measuring 0.8 cm, suspicious for choledocholithiasis. Biliary duct dilation. Rectum distended with stool  MRCP 5/20:  Cholelithiasis and choledocholithiasis. Biliary ductal dilatation with 8 mm CBD.  ERCP 5/21: Choledocholithiasis was found. Complete removal was accomplished by biliary sphincterotomy and balloon extraction.    Plan:  - Imaging reviewed and discussed with patient  - Monitor GI function   - c/o constipation. Recommend a daily bowel regimen of Miralax daily and Senna qhs. prn dulcolax suppository  - LActulose x1 ordered to assist with BM  - s/p lap west. Recovering appropriately  - prn pain meds. Would consider Movantik to prevent opioid induced constipation if patient requires frequent prn oxycodone  - Diet as per surgery team   #Choledocholithiasis  #CBD dilation  #Constipation    CT 5/19: Hyperdensity in the distal common bile duct measuring 0.8 cm, suspicious for choledocholithiasis. Biliary duct dilation. Rectum distended with stool  MRCP 5/20:  Cholelithiasis and choledocholithiasis. Biliary ductal dilatation with 8 mm CBD.  ERCP 5/21: Choledocholithiasis was found. Complete removal was accomplished by biliary sphincterotomy and balloon extraction.    Plan:  - Imaging reviewed and discussed with patient  - Monitor GI function   - c/o constipation. Recommend a daily bowel regimen of Miralax daily and Senna qhs. prn dulcolax suppository  - LActulose x1 ordered to assist with BM  - s/p lap west. Recovering appropriately  - prn pain meds. Would consider Movantik to prevent opioid induced constipation if patient requires frequent prn oxycodone  - Diet as tolerated    I reviewed the overnight course of events on the unit, re-confirming the patient history. I discussed the care with the patient   The plan of care was discussed by the nurse practitioner and modifications were made to the notation where appropriate.   Differential diagnosis and plan of care discussed with patient after the evaluation  35 minutes spent on total encounter of which more than fifty percent of the encounter was spent counseling and/or coordinating care with the attending physician.  Advanced care planning was discussed with patient and family.  Advanced care planning forms were reviewed and discussed.  Risks, benefits and alternatives of gastroenterologic procedures/interventions were discussed in detail and all questions were answered.

## 2025-05-28 NOTE — PROGRESS NOTE ADULT - ASSESSMENT
69 yo F pmhx of IDDM, HTN, HLD, Epilepsy on keppra/vimpat presents for one week of epigastric abdominal pain, found to have choledocholithiases now status post ERCP with removal and balloon extraction of stones. General Surgery was consulted for interval CCY. Pt now s/p lap west on 5/24. Post operative course complicated by poor glycemic control    Plan  -CC diet  -Pain Control   -DVT ppx   -Appreciate endocrine recommendations - discharge home with their recommendations     ACS  [id number]

## 2025-05-28 NOTE — PROGRESS NOTE ADULT - SUBJECTIVE AND OBJECTIVE BOX
[facility]    Dr. Jose Flannery     Office [phone] (9 am to 5 pm)  Service: [phone] (5pm to 9am)  Also Available on TEAMS      RENAL PROGRESS NOTE: DATE OF SERVICE 05-28-25 @ 14:32    Patient is a 70y old  Female who presents with a chief complaint of abd pain (23 May 2025 10:20)      Patient seen and examined at bedside. No chest pain/sob    VITALS:  T(F): 98.1 (05-28-25 @ 09:00), Max: 98.2 (05-28-25 @ 00:07)  HR: 72 (05-28-25 @ 09:00)  BP: 156/76 (05-28-25 @ 09:00)  RR: 18 (05-28-25 @ 09:00)  SpO2: 97% (05-28-25 @ 09:00)  Wt(kg): --    05-27 @ 07:01  -  05-28 @ 07:00  --------------------------------------------------------  IN: 660 mL / OUT: 0 mL / NET: 660 mL    05-28 @ 07:01  -  05-28 @ 14:32  --------------------------------------------------------  IN: 680 mL / OUT: 0 mL / NET: 680 mL          PHYSICAL EXAM:  Constitutional: NAD  Neck: No JVD  Respiratory: CTAB, no wheezes, rales or rhonchi  Cardiovascular: S1, S2, RRR  Gastrointestinal: BS+, soft, NT/ND  Extremities: No peripheral edema    Hospital Medications:   MEDICATIONS  (STANDING):  acetaminophen     Tablet .. 650 milliGRAM(s) Oral every 6 hours  aspirin enteric coated 81 milliGRAM(s) Oral daily  dextrose 5%. 1000 milliLiter(s) (100 mL/Hr) IV Continuous <Continuous>  dextrose 5%. 1000 milliLiter(s) (50 mL/Hr) IV Continuous <Continuous>  dextrose 50% Injectable 25 Gram(s) IV Push once  dextrose 50% Injectable 12.5 Gram(s) IV Push once  dextrose 50% Injectable 25 Gram(s) IV Push once  enoxaparin Injectable 40 milliGRAM(s) SubCutaneous every 24 hours  ezetimibe 10 milliGRAM(s) Oral daily  gabapentin 100 milliGRAM(s) Oral daily  glucagon  Injectable 1 milliGRAM(s) IntraMuscular once  ibuprofen  Tablet. 400 milliGRAM(s) Oral every 6 hours  insulin glargine Injectable (LANTUS) 8 Unit(s) SubCutaneous at bedtime  insulin lispro (ADMELOG) corrective regimen sliding scale   SubCutaneous at bedtime  insulin lispro (ADMELOG) corrective regimen sliding scale   SubCutaneous three times a day before meals  insulin lispro Injectable (ADMELOG) 4 Unit(s) SubCutaneous three times a day before meals  ketotifen 0.025% Ophthalmic Solution 1 Drop(s) Both EYES two times a day  lactulose Syrup 20 Gram(s) Oral once  levETIRAcetam 500 milliGRAM(s) Oral two times a day  metoprolol succinate ER 12.5 milliGRAM(s) Oral daily  rosuvastatin 20 milliGRAM(s) Oral at bedtime      LABS:  05-28    133[L]  |  93[L]  |  9   ----------------------------<  277[H]  4.3   |  25  |  0.89    Ca    9.3      28 May 2025 10:07  Phos  3.0     05-28  Mg     1.8     05-28    TPro  6.9  /  Alb  4.0  /  TBili  0.4  /  DBili      /  AST  92[H]  /  ALT  94[H]  /  AlkPhos  103  05-28    Creatinine Trend: 0.89 <--, 0.60 <--, 0.52 <--, 0.48 <--, 0.63 <--    Albumin: 4.0 g/dL (05-28 @ 10:07)  Phosphorus: 3.0 mg/dL (05-28 @ 10:07)                              13.1   5.62  )-----------( 187      ( 28 May 2025 10:07 )             38.3     Urine Studies:  Urinalysis - [05-28-25 @ 10:07]      Color  / Appearance  / SG  / pH       Gluc 277 / Ketone   / Bili  / Urobili        Blood  / Protein  / Leuk Est  / Nitrite       RBC  / WBC  / Hyaline  / Gran  / Sq Epi  / Non Sq Epi  / Bacteria     Urine Creatinine 27      [05-21-25 @ 21:15]  Urine Sodium 111      [05-21-25 @ 21:15]  Urine Osmolality 555      [05-21-25 @ 21:15]    TSH 1.66      [05-21-25 @ 07:21]        RADIOLOGY & ADDITIONAL STUDIES:

## 2025-05-28 NOTE — PROGRESS NOTE ADULT - NUTRITIONAL ASSESSMENT
This patient has been assessed with a concern for Malnutrition and has been determined to have a diagnosis/diagnoses of Severe protein-calorie malnutrition and Underweight (BMI < 19).    This patient is being managed with:   Diet Consistent Carbohydrate/No Snacks-  1500mL Fluid Restriction ([id number])  Entered: May 25 2025  1:16PM  
This patient has been assessed with a concern for Malnutrition and has been determined to have a diagnosis/diagnoses of Severe protein-calorie malnutrition and Underweight (BMI < 19).    This patient is being managed with:   Diet Consistent Carbohydrate/No Snacks-  1500mL Fluid Restriction ([id number])  Entered: May 25 2025  1:16PM  
This patient has been assessed with a concern for Malnutrition and has been determined to have a diagnosis/diagnoses of Severe protein-calorie malnutrition and Underweight (BMI < 19).    This patient is being managed with:   Diet Full Liquid-  Entered: May 22 2025  7:03AM  
This patient has been assessed with a concern for Malnutrition and has been determined to have a diagnosis/diagnoses of Severe protein-calorie malnutrition and Underweight (BMI < 19).    This patient is being managed with:   Diet Consistent Carbohydrate/No Snacks-  1500mL Fluid Restriction ([id number])  Entered: May 25 2025  1:16PM  
This patient has been assessed with a concern for Malnutrition and has been determined to have a diagnosis/diagnoses of Severe protein-calorie malnutrition and Underweight (BMI < 19).    This patient is being managed with:   Diet Consistent Carbohydrate/No Snacks-  Entered: May 22 2025 11:09AM  
This patient has been assessed with a concern for Malnutrition and has been determined to have a diagnosis/diagnoses of Severe protein-calorie malnutrition and Underweight (BMI < 19).    This patient is being managed with:   Diet NPO after Midnight-     NPO Start Date: 23-May-2025   NPO Start Time: 23:59  Except Medications  Entered: May 23 2025 10:26AM    Diet Consistent Carbohydrate/No Snacks-  Entered: May 22 2025 11:09AM  
This patient has been assessed with a concern for Malnutrition and has been determined to have a diagnosis/diagnoses of Severe protein-calorie malnutrition and Underweight (BMI < 19).    This patient is being managed with:   Diet Regular-  Consistent Carbohydrate {Evening Snack} (CSTCHOSN)  Entered: May 24 2025  3:48PM  
This patient has been assessed with a concern for Malnutrition and has been determined to have a diagnosis/diagnoses of Severe protein-calorie malnutrition and Underweight (BMI < 19).    This patient is being managed with:   Diet Consistent Carbohydrate/No Snacks-  1500mL Fluid Restriction ([id number])  Entered: May 25 2025  1:16PM  
This patient has been assessed with a concern for Malnutrition and has been determined to have a diagnosis/diagnoses of Severe protein-calorie malnutrition and Underweight (BMI < 19).    This patient is being managed with:   Diet Consistent Carbohydrate/No Snacks-  1500mL Fluid Restriction ([id number])  Entered: May 25 2025  1:16PM

## 2025-06-02 LAB — SURGICAL PATHOLOGY STUDY: SIGNIFICANT CHANGE UP

## 2025-06-11 ENCOUNTER — APPOINTMENT (OUTPATIENT)
Dept: TRAUMA SURGERY | Facility: CLINIC | Age: 70
End: 2025-06-11
Payer: MEDICARE

## 2025-06-11 PROCEDURE — 99024 POSTOP FOLLOW-UP VISIT: CPT

## 2025-06-12 NOTE — H&P CARDIOLOGY - BIRTH SEX
Quality 130: Documentation Of Current Medications In The Medical Record: Current Medications Documented Quality 431: Preventive Care And Screening: Unhealthy Alcohol Use - Screening: Patient screened for unhealthy alcohol use using a single question and scores less than 2 times per year Detail Level: Detailed Quality 110: Preventive Care And Screening: Influenza Immunization: Influenza Immunization Administered during Influenza season Quality 226: Preventive Care And Screening: Tobacco Use: Screening And Cessation Intervention: Tobacco Screening not Performed for Unknown Reasons Female

## (undated) DEVICE — DRAPE MAYO STAND 30"

## (undated) DEVICE — SOL IRR POUR H2O 250ML

## (undated) DEVICE — DISSECTOR ENDO PEANUT 5MM

## (undated) DEVICE — FOLEY HOLDER STATLOCK 2 WAY ADULT

## (undated) DEVICE — SENSOR O2 FINGER ADULT

## (undated) DEVICE — SPECIMEN CONTAINER 100ML

## (undated) DEVICE — VENODYNE/SCD SLEEVE CALF MEDIUM

## (undated) DEVICE — GOWN TRIMAX LG

## (undated) DEVICE — SUT POLYSORB 0 36" GU-46

## (undated) DEVICE — DRSG TELFA 3 X 8

## (undated) DEVICE — SYR LUER LOK 30CC

## (undated) DEVICE — SUCTION YANKAUER NO CONTROL VENT

## (undated) DEVICE — PACK IV START WITH CHG

## (undated) DEVICE — SOL INJ NS 0.9% 500ML 2 PORT

## (undated) DEVICE — TUBING IV SET GRAVITY 3Y 100" MACRO

## (undated) DEVICE — ELCTR BOVIE PENCIL SMOKE EVACUATION

## (undated) DEVICE — STOPCOCK 3 WAY W TUBE 35"

## (undated) DEVICE — GLV 7 PROTEXIS (WHITE)

## (undated) DEVICE — CATH IV SAFE BC 20G X 1.16" (PINK)

## (undated) DEVICE — MEDICATION LABELS W MARKER

## (undated) DEVICE — TUBING TUR 2 PRONG

## (undated) DEVICE — CATH IV SAFE INSYTE 14G X 1.75" (ORANGE)

## (undated) DEVICE — ENDO DISSECT INST 5MM

## (undated) DEVICE — ELCTR LAPAROSCOPIC MONOPOLAR CORD

## (undated) DEVICE — GLV 7.5 PROTEXIS (WHITE)

## (undated) DEVICE — GLV 8.5 PROTEXIS (WHITE)

## (undated) DEVICE — INJ SYS RAP REFIL

## (undated) DEVICE — PREP CHLORAPREP HI-LITE ORANGE 26ML

## (undated) DEVICE — PAPIL BILRTH II 6-5FRX0.035IN

## (undated) DEVICE — TUBING TRUWAVE PRESSURE MALE/FEMALE 72"

## (undated) DEVICE — D HELP - CLEARVIEW CLEARIFY SYSTEM

## (undated) DEVICE — SOL IRR POUR NS 0.9% 500ML

## (undated) DEVICE — DVC AUTO CAP RX LOKG

## (undated) DEVICE — SYR LUER LOK 20CC

## (undated) DEVICE — BITE BLOCK ADULT 20 X 27MM (GREEN)

## (undated) DEVICE — POSITIONER FOAM EGG CRATE ULNAR 2PCS (PINK)

## (undated) DEVICE — WARMING BLANKET UPPER ADULT

## (undated) DEVICE — SPHINCTEROTOME CLEVERCUT WIRE 25MM  2.8MM X 170CM

## (undated) DEVICE — GLV 6.5 PROTEXIS (WHITE)

## (undated) DEVICE — DRAPE C ARM UNIVERSAL

## (undated) DEVICE — OLYMPUS DISTAL COVER ENDOSCOPE

## (undated) DEVICE — TROCAR COVIDIEN BLUNT TIP HASSAN 10MM

## (undated) DEVICE — SUT BIOSYN 4-0 18" P-12

## (undated) DEVICE — TUBING INSUFFLATION LAP FILTER 10FT

## (undated) DEVICE — TROCAR COVIDIEN VERSASTEP PLUS 12MM STANDARD

## (undated) DEVICE — PACK ADVANCED LAPAROSCOPIC NS

## (undated) DEVICE — NDL INJ SCLERO INTERJECT 23G

## (undated) DEVICE — DRSG STERISTRIPS 0.5 X 4"

## (undated) DEVICE — LITHOTRIPY BASKET TRAPEZOID 2.5CM

## (undated) DEVICE — ONCORE 26 INSUFLATION DEVICE

## (undated) DEVICE — BIOPSY FORCEP RADIAL JAW 4 STANDARD WITH NEEDLE

## (undated) DEVICE — ELCTR GROUNDING PAD ADULT COVIDIEN

## (undated) DEVICE — ENDOCATCH 10MM

## (undated) DEVICE — DRAPE TOWEL BLUE 17" X 24"

## (undated) DEVICE — INSUFFLATION NDL COVIDIEN STEP 14G FOR STEP/VERSASTEP

## (undated) DEVICE — SUT PDS II 0 18" ENDOLOOP LIGATURE

## (undated) DEVICE — DRAPE INSTRUMENT POUCH 6.75" X 11"

## (undated) DEVICE — NDL INJ SCLERO INTERJECT 25G

## (undated) DEVICE — DRSG OPSITE 2.5 X 2"

## (undated) DEVICE — SNARE POLYP SENS SM 13MM 240CM

## (undated) DEVICE — TUBING STRYKEFLOW II SUCTION / IRRIGATOR

## (undated) DEVICE — POSITIONER FOAM HEAD CRADLE (PINK)

## (undated) DEVICE — TUBING IRRIGATION DAVOL SYSTEM X STREAM

## (undated) DEVICE — BRUSH COLONOSCOPY CYTOLOGY

## (undated) DEVICE — GLV 8 PROTEXIS (WHITE)

## (undated) DEVICE — SNARE POLYP MINI ACCUSNR 1.5 X 3CM

## (undated) DEVICE — CATH IV SAFE BC 22G X 1" (BLUE)

## (undated) DEVICE — TROCAR COVIDIEN VERSAPORT BLADELESS OPTICAL 5MM STANDARD

## (undated) DEVICE — TUBING SUCTION 20FT

## (undated) DEVICE — STAPLER COVIDIEN ENDO GIA XL HANDLE

## (undated) DEVICE — BRUSH CYTO RAP EXCHG 3MM